# Patient Record
Sex: MALE | Race: WHITE | NOT HISPANIC OR LATINO | Employment: OTHER | URBAN - NONMETROPOLITAN AREA
[De-identification: names, ages, dates, MRNs, and addresses within clinical notes are randomized per-mention and may not be internally consistent; named-entity substitution may affect disease eponyms.]

---

## 2017-02-27 RX ORDER — VALSARTAN AND HYDROCHLOROTHIAZIDE 320; 25 MG/1; MG/1
1 TABLET, FILM COATED ORAL DAILY
Qty: 90 TABLET | Refills: 3 | Status: SHIPPED | OUTPATIENT
Start: 2017-02-27 | End: 2017-02-27 | Stop reason: SDUPTHER

## 2017-02-27 RX ORDER — VALSARTAN AND HYDROCHLOROTHIAZIDE 320; 25 MG/1; MG/1
1 TABLET, FILM COATED ORAL DAILY
Qty: 90 TABLET | Refills: 3 | Status: SHIPPED | OUTPATIENT
Start: 2017-02-27 | End: 2018-02-28 | Stop reason: SDUPTHER

## 2017-03-16 ENCOUNTER — LAB (OUTPATIENT)
Dept: LAB | Facility: HOSPITAL | Age: 62
End: 2017-03-16

## 2017-03-16 DIAGNOSIS — Z11.59 NEED FOR HEPATITIS C SCREENING TEST: ICD-10-CM

## 2017-03-16 DIAGNOSIS — E78.2 MIXED HYPERLIPIDEMIA: ICD-10-CM

## 2017-03-16 LAB
ALBUMIN SERPL-MCNC: 4.8 G/DL (ref 3.4–4.8)
ALBUMIN/GLOB SERPL: 2.1 G/DL (ref 1.1–1.8)
ALP SERPL-CCNC: 57 U/L (ref 38–126)
ALT SERPL W P-5'-P-CCNC: 50 U/L (ref 21–72)
ANION GAP SERPL CALCULATED.3IONS-SCNC: 12 MMOL/L (ref 5–15)
ARTICHOKE IGE QN: 79 MG/DL (ref 1–129)
AST SERPL-CCNC: 38 U/L (ref 17–59)
BILIRUB SERPL-MCNC: 0.9 MG/DL (ref 0.2–1.3)
BUN BLD-MCNC: 18 MG/DL (ref 7–21)
BUN/CREAT SERPL: 15.9 (ref 7–25)
CALCIUM SPEC-SCNC: 9.7 MG/DL (ref 8.4–10.2)
CHLORIDE SERPL-SCNC: 100 MMOL/L (ref 95–110)
CHOLEST SERPL-MCNC: 251 MG/DL (ref 0–199)
CO2 SERPL-SCNC: 28 MMOL/L (ref 22–31)
CREAT BLD-MCNC: 1.13 MG/DL (ref 0.7–1.3)
GFR SERPL CREATININE-BSD FRML MDRD: 66 ML/MIN/1.73 (ref 49–113)
GLOBULIN UR ELPH-MCNC: 2.3 GM/DL (ref 2.3–3.5)
GLUCOSE BLD-MCNC: 105 MG/DL (ref 60–100)
HDLC SERPL-MCNC: 38 MG/DL (ref 60–200)
LDLC/HDLC SERPL: ABNORMAL {RATIO} (ref 0–3.55)
POTASSIUM BLD-SCNC: 4.3 MMOL/L (ref 3.5–5.1)
PROT SERPL-MCNC: 7.1 G/DL (ref 6.3–8.6)
SODIUM BLD-SCNC: 140 MMOL/L (ref 137–145)
TRIGL SERPL-MCNC: 601 MG/DL (ref 20–199)

## 2017-03-16 PROCEDURE — 80053 COMPREHEN METABOLIC PANEL: CPT | Performed by: GENERAL PRACTICE

## 2017-03-16 PROCEDURE — 80061 LIPID PANEL: CPT | Performed by: GENERAL PRACTICE

## 2017-03-16 PROCEDURE — 36415 COLL VENOUS BLD VENIPUNCTURE: CPT

## 2017-03-16 PROCEDURE — 86803 HEPATITIS C AB TEST: CPT | Performed by: GENERAL PRACTICE

## 2017-03-17 LAB — HCV AB SER DONR QL: NEGATIVE

## 2017-03-20 ENCOUNTER — OFFICE VISIT (OUTPATIENT)
Dept: FAMILY MEDICINE CLINIC | Facility: CLINIC | Age: 62
End: 2017-03-20

## 2017-03-20 VITALS
SYSTOLIC BLOOD PRESSURE: 138 MMHG | DIASTOLIC BLOOD PRESSURE: 70 MMHG | HEART RATE: 65 BPM | BODY MASS INDEX: 27.99 KG/M2 | OXYGEN SATURATION: 96 % | WEIGHT: 195.5 LBS | HEIGHT: 70 IN

## 2017-03-20 DIAGNOSIS — E78.2 MIXED HYPERLIPIDEMIA: Primary | ICD-10-CM

## 2017-03-20 DIAGNOSIS — G47.09 OTHER INSOMNIA: ICD-10-CM

## 2017-03-20 DIAGNOSIS — Z12.5 ENCOUNTER FOR PROSTATE CANCER SCREENING: ICD-10-CM

## 2017-03-20 DIAGNOSIS — M15.9 DEGENERATIVE JOINT DISEASE INVOLVING MULTIPLE JOINTS ON BOTH SIDES OF BODY: ICD-10-CM

## 2017-03-20 DIAGNOSIS — Z00.00 ANNUAL PHYSICAL EXAM: ICD-10-CM

## 2017-03-20 DIAGNOSIS — I10 ESSENTIAL HYPERTENSION: ICD-10-CM

## 2017-03-20 PROCEDURE — 99214 OFFICE O/P EST MOD 30 MIN: CPT | Performed by: GENERAL PRACTICE

## 2017-03-20 RX ORDER — DICYCLOMINE HCL 20 MG
20 TABLET ORAL AS NEEDED
COMMUNITY
End: 2017-03-20 | Stop reason: SDUPTHER

## 2017-03-20 RX ORDER — FENOFIBRATE 145 MG/1
145 TABLET, COATED ORAL DAILY
Qty: 90 TABLET | Refills: 3 | Status: SHIPPED | OUTPATIENT
Start: 2017-03-20 | End: 2018-02-28 | Stop reason: SDUPTHER

## 2017-03-20 RX ORDER — PERPHENAZINE 16 MG
1 TABLET ORAL DAILY
COMMUNITY
End: 2017-08-20

## 2017-03-20 RX ORDER — NAPROXEN SODIUM 220 MG
220 TABLET ORAL AS NEEDED
COMMUNITY
End: 2017-03-20

## 2017-03-20 RX ORDER — MAGNESIUM GLUCONATE 27 MG(500)
500 TABLET ORAL DAILY
COMMUNITY

## 2017-03-20 RX ORDER — CELECOXIB 200 MG/1
200 CAPSULE ORAL AS NEEDED
Qty: 90 CAPSULE | Refills: 3 | Status: SHIPPED | OUTPATIENT
Start: 2017-03-20 | End: 2018-07-25 | Stop reason: SDUPTHER

## 2017-03-20 RX ORDER — RANITIDINE 150 MG/1
150 TABLET ORAL DAILY PRN
COMMUNITY
End: 2020-06-02

## 2017-03-20 RX ORDER — TRAZODONE HYDROCHLORIDE 50 MG/1
TABLET ORAL
Qty: 60 TABLET | Refills: 2 | Status: SHIPPED | OUTPATIENT
Start: 2017-03-20 | End: 2017-08-28 | Stop reason: SDUPTHER

## 2017-03-20 RX ORDER — NEBIVOLOL 10 MG/1
10 TABLET ORAL DAILY
Qty: 90 TABLET | Refills: 3 | Status: SHIPPED | OUTPATIENT
Start: 2017-03-20 | End: 2018-02-28 | Stop reason: SDUPTHER

## 2017-03-20 RX ORDER — DICYCLOMINE HCL 20 MG
20 TABLET ORAL AS NEEDED
Qty: 30 TABLET | Refills: 5 | Status: SHIPPED | OUTPATIENT
Start: 2017-03-20 | End: 2018-04-16 | Stop reason: SDUPTHER

## 2017-03-20 RX ORDER — CELECOXIB 200 MG/1
200 CAPSULE ORAL AS NEEDED
COMMUNITY
End: 2017-03-20 | Stop reason: SDUPTHER

## 2017-03-20 RX ORDER — THIAMINE HCL 100 MG
5000 TABLET ORAL DAILY
COMMUNITY

## 2017-03-20 NOTE — PROGRESS NOTES
Subjective   Ben Abebe is a 61 y.o. male.     Chief Complaint   Patient presents with   • Hypertension     For review and evaluation of management of chronic medical problems. Labs reviewed. Would like to try something else for sleep that is not controlled, temazepam not very effective.   Hypertension   This is a chronic problem. The current episode started more than 1 year ago. The problem is unchanged. The problem is controlled. Pertinent negatives include no chest pain, headaches, neck pain, palpitations or shortness of breath. There are no associated agents to hypertension. Past treatments include beta blockers, angiotensin blockers and diuretics. The current treatment provides significant improvement. There are no compliance problems.    Hyperlipidemia   This is a chronic problem. The current episode started more than 1 year ago. The problem is uncontrolled. Recent lipid tests were reviewed and are high. Pertinent negatives include no chest pain, myalgias or shortness of breath. He is currently on no antihyperlipidemic treatment (stopped due to elevated CPK, has not helped). The current treatment provides no improvement of lipids.      The following portions of the patient's history were reviewed and updated as appropriate: allergies, current medications, past social history and problem list.    Current Outpatient Prescriptions:   •  Acetaminophen (TYLENOL EXTRA STRENGTH PO), Take 1 tablet by mouth As Needed., Disp: , Rfl:   •  Alpha-Lipoic Acid 600 MG capsule, Take 1 capsule by mouth Daily., Disp: , Rfl:   •  celecoxib (CeleBREX) 200 MG capsule, Take 1 capsule by mouth As Needed for Mild Pain (1-3)., Disp: 90 capsule, Rfl: 3  •  dicyclomine (BENTYL) 20 MG tablet, Take 1 tablet by mouth As Needed (bowel problem)., Disp: 30 tablet, Rfl: 5  •  magnesium gluconate (MAGONATE) 500 MG tablet, Take 500 mg by mouth Daily., Disp: , Rfl:   •  Multiple Vitamins-Minerals (CENTRUM SILVER ULTRA MENS PO), Take 1  tablet by mouth Daily., Disp: , Rfl:   •  nebivolol (BYSTOLIC) 10 MG tablet, Take 1 tablet by mouth Daily., Disp: 90 tablet, Rfl: 3  •  raNITIdine (ZANTAC) 150 MG tablet, Take 150 mg by mouth Daily., Disp: , Rfl:   •  SUMAtriptan (IMITREX) 50 MG tablet, Take one tablet at onset of headache. May repeat dose one time in 2 hours if headache not relieved., Disp: 9 tablet, Rfl: 2  •  temazepam (RESTORIL) 15 MG capsule, Take 1-2 capsules by mouth At Night As Needed for sleep., Disp: 60 capsule, Rfl: 2  •  tiZANidine (ZANAFLEX) 4 MG tablet, Take 1 tablet by mouth Every 8 (Eight) Hours As Needed for muscle spasms., Disp: 90 tablet, Rfl: 3  •  valsartan-hydrochlorothiazide (DIOVAN-HCT) 320-25 MG per tablet, Take 1 tablet by mouth Daily., Disp: 90 tablet, Rfl: 3  •  vitamin B-12 (CYANOCOBALAMIN) 2500 MCG sublingual tablet tablet, Place 5,000 mcg under the tongue Daily., Disp: , Rfl:   •  fenofibrate (TRICOR) 145 MG tablet, Take 1 tablet by mouth Daily., Disp: 90 tablet, Rfl: 3  •  traZODone (DESYREL) 50 MG tablet, 1 - 2 qhs for sleep, Disp: 60 tablet, Rfl: 2    Review of Systems   Constitutional: Negative.  Negative for activity change, appetite change, chills, fatigue, fever and unexpected weight change.   HENT: Negative.  Negative for congestion, ear pain, hearing loss, nosebleeds, rhinorrhea, sinus pressure, sneezing, sore throat, tinnitus and trouble swallowing.    Eyes: Negative.  Negative for pain, discharge, redness, itching and visual disturbance.   Respiratory: Negative.  Negative for apnea, cough, chest tightness, shortness of breath and wheezing.    Cardiovascular: Negative.  Negative for chest pain, palpitations and leg swelling.   Gastrointestinal: Negative.  Negative for abdominal distention, abdominal pain, constipation, diarrhea, nausea and vomiting.   Endocrine: Negative.    Genitourinary: Negative.  Negative for dysuria, frequency and urgency.   Musculoskeletal: Positive for arthralgias and back pain.  "Negative for gait problem, joint swelling, myalgias, neck pain and neck stiffness.   Skin: Negative.  Negative for color change and rash.   Allergic/Immunologic: Negative.    Neurological: Negative.  Negative for dizziness, weakness, light-headedness, numbness and headaches.   Hematological: Negative.  Negative for adenopathy.   Psychiatric/Behavioral: Negative.  Negative for dysphoric mood and sleep disturbance. The patient is not nervous/anxious.      Objective     Visit Vitals   • /70 (BP Location: Left arm, Patient Position: Sitting, Cuff Size: Adult)   • Pulse 65   • Ht 70\" (177.8 cm)   • Wt 195 lb 8 oz (88.7 kg)   • SpO2 96%   • BMI 28.05 kg/m2     Physical Exam   Constitutional: He is oriented to person, place, and time. He appears well-developed and well-nourished. No distress.   HENT:   Head: Normocephalic.   Nose: Nose normal.   Mouth/Throat: Oropharynx is clear and moist.   Eyes: Conjunctivae and EOM are normal. Pupils are equal, round, and reactive to light. Right eye exhibits no discharge. Left eye exhibits no discharge.   Neck: No thyromegaly present.   Cardiovascular: Normal rate, regular rhythm, normal heart sounds and intact distal pulses.    No murmur heard.  Pulmonary/Chest: Effort normal and breath sounds normal.   Musculoskeletal: He exhibits no edema.   Lymphadenopathy:     He has no cervical adenopathy.   Neurological: He is alert and oriented to person, place, and time.   Skin: Skin is warm and dry.   Psychiatric: He has a normal mood and affect.   Nursing note and vitals reviewed.    Results for orders placed or performed in visit on 03/16/17   Hepatitis C Antibody   Result Value Ref Range    Hepatitis C Ab Negative Negative   Lipid Panel   Result Value Ref Range    Total Cholesterol 251 (H) 0 - 199 mg/dL    Triglycerides 601 (H) 20 - 199 mg/dL    HDL Cholesterol 38 (L) 60 - 200 mg/dL    LDL Cholesterol  79 1 - 129 mg/dL    LDL/HDL Ratio  0.00 - 3.55   Comprehensive Metabolic Panel "   Result Value Ref Range    Glucose 105 (H) 60 - 100 mg/dL    BUN 18 7 - 21 mg/dL    Creatinine 1.13 0.70 - 1.30 mg/dL    Sodium 140 137 - 145 mmol/L    Potassium 4.3 3.5 - 5.1 mmol/L    Chloride 100 95 - 110 mmol/L    CO2 28.0 22.0 - 31.0 mmol/L    Calcium 9.7 8.4 - 10.2 mg/dL    Total Protein 7.1 6.3 - 8.6 g/dL    Albumin 4.80 3.40 - 4.80 g/dL    ALT (SGPT) 50 21 - 72 U/L    AST (SGOT) 38 17 - 59 U/L    Alkaline Phosphatase 57 38 - 126 U/L    Total Bilirubin 0.9 0.2 - 1.3 mg/dL    eGFR Non  Amer 66 49 - 113 mL/min/1.73    Globulin 2.3 2.3 - 3.5 gm/dL    A/G Ratio 2.1 (H) 1.1 - 1.8 g/dL    BUN/Creatinine Ratio 15.9 7.0 - 25.0    Anion Gap 12.0 5.0 - 15.0 mmol/L      Assessment/Plan     Problem List Items Addressed This Visit        Cardiovascular and Mediastinum    Hyperlipidemia - Primary    Relevant Medications    fenofibrate (TRICOR) 145 MG tablet    Other Relevant Orders    Comprehensive Metabolic Panel    Lipid Panel    Essential hypertension    Relevant Medications    nebivolol (BYSTOLIC) 10 MG tablet       Other    Insomnia      Other Visit Diagnoses     Degenerative joint disease involving multiple joints on both sides of body        Relevant Orders    CBC & Differential    Annual physical exam        Relevant Orders    CBC & Differential    Comprehensive Metabolic Panel    Lipid Panel    PSA Screen    Urinalysis With / Culture If Indicated    Encounter for prostate cancer screening        Relevant Orders    PSA Screen        Start tricor. Restart celebrex.     New Medications Ordered This Visit   Medications   • Multiple Vitamins-Minerals (CENTRUM SILVER ULTRA MENS PO)     Sig: Take 1 tablet by mouth Daily.   • Alpha-Lipoic Acid 600 MG capsule     Sig: Take 1 capsule by mouth Daily.   • magnesium gluconate (MAGONATE) 500 MG tablet     Sig: Take 500 mg by mouth Daily.   • raNITIdine (ZANTAC) 150 MG tablet     Sig: Take 150 mg by mouth Daily.   • Acetaminophen (TYLENOL EXTRA STRENGTH PO)     Sig: Take  1 tablet by mouth As Needed.   • vitamin B-12 (CYANOCOBALAMIN) 2500 MCG sublingual tablet tablet     Sig: Place 5,000 mcg under the tongue Daily.   • traZODone (DESYREL) 50 MG tablet     Si - 2 qhs for sleep     Dispense:  60 tablet     Refill:  2   • celecoxib (CeleBREX) 200 MG capsule     Sig: Take 1 capsule by mouth As Needed for Mild Pain (1-3).     Dispense:  90 capsule     Refill:  3   • dicyclomine (BENTYL) 20 MG tablet     Sig: Take 1 tablet by mouth As Needed (bowel problem).     Dispense:  30 tablet     Refill:  5   • nebivolol (BYSTOLIC) 10 MG tablet     Sig: Take 1 tablet by mouth Daily.     Dispense:  90 tablet     Refill:  3   • fenofibrate (TRICOR) 145 MG tablet     Sig: Take 1 tablet by mouth Daily.     Dispense:  90 tablet     Refill:  3

## 2017-06-13 ENCOUNTER — TRANSCRIBE ORDERS (OUTPATIENT)
Dept: LAB | Facility: HOSPITAL | Age: 62
End: 2017-06-13

## 2017-06-13 DIAGNOSIS — R79.89 OTHER ABNORMAL BLOOD CHEMISTRY: Primary | ICD-10-CM

## 2017-07-07 RX ORDER — TIZANIDINE 4 MG/1
TABLET ORAL
Qty: 180 TABLET | Refills: 3 | Status: SHIPPED | OUTPATIENT
Start: 2017-07-07 | End: 2018-07-10 | Stop reason: SDUPTHER

## 2017-08-14 DIAGNOSIS — N50.89 SWOLLEN TESTICLE: Primary | ICD-10-CM

## 2017-08-16 ENCOUNTER — TELEPHONE (OUTPATIENT)
Dept: FAMILY MEDICINE CLINIC | Facility: CLINIC | Age: 62
End: 2017-08-16

## 2017-08-20 ENCOUNTER — APPOINTMENT (OUTPATIENT)
Dept: GENERAL RADIOLOGY | Facility: HOSPITAL | Age: 62
End: 2017-08-20

## 2017-08-20 ENCOUNTER — HOSPITAL ENCOUNTER (INPATIENT)
Facility: HOSPITAL | Age: 62
LOS: 2 days | Discharge: HOME | End: 2017-08-22
Attending: EMERGENCY MEDICINE | Admitting: INTERNAL MEDICINE

## 2017-08-20 ENCOUNTER — APPOINTMENT (OUTPATIENT)
Dept: ULTRASOUND IMAGING | Facility: HOSPITAL | Age: 62
End: 2017-08-20

## 2017-08-20 ENCOUNTER — APPOINTMENT (OUTPATIENT)
Dept: CT IMAGING | Facility: HOSPITAL | Age: 62
End: 2017-08-20

## 2017-08-20 DIAGNOSIS — I26.99 OTHER ACUTE PULMONARY EMBOLISM WITHOUT ACUTE COR PULMONALE (HCC): Primary | ICD-10-CM

## 2017-08-20 DIAGNOSIS — R07.9 CHEST PAIN, UNSPECIFIED TYPE: ICD-10-CM

## 2017-08-20 LAB
ALBUMIN SERPL-MCNC: 4.9 G/DL (ref 3.4–4.8)
ALBUMIN/GLOB SERPL: 1.8 G/DL (ref 1.1–1.8)
ALP SERPL-CCNC: 50 U/L (ref 38–126)
ALT SERPL W P-5'-P-CCNC: 61 U/L (ref 21–72)
ANION GAP SERPL CALCULATED.3IONS-SCNC: 14 MMOL/L (ref 5–15)
AST SERPL-CCNC: 40 U/L (ref 17–59)
BASOPHILS # BLD AUTO: 0.01 10*3/MM3 (ref 0–0.2)
BASOPHILS NFR BLD AUTO: 0.1 % (ref 0–2)
BILIRUB SERPL-MCNC: 1.1 MG/DL (ref 0.2–1.3)
BILIRUB UR QL STRIP: NEGATIVE
BUN BLD-MCNC: 18 MG/DL (ref 7–21)
BUN/CREAT SERPL: 15.1 (ref 7–25)
CALCIUM SPEC-SCNC: 9.5 MG/DL (ref 8.4–10.2)
CHLORIDE SERPL-SCNC: 101 MMOL/L (ref 95–110)
CK MB SERPL-CCNC: 8.59 NG/ML (ref 0–5)
CK SERPL-CCNC: 615 U/L (ref 55–170)
CLARITY UR: CLEAR
CO2 SERPL-SCNC: 24 MMOL/L (ref 22–31)
COLOR UR: YELLOW
CREAT BLD-MCNC: 1.19 MG/DL (ref 0.7–1.3)
D-DIMER, QUANTITATIVE (MAD,POW, STR): 1781 NG/ML (FEU) (ref 0–470)
DEPRECATED RDW RBC AUTO: 42.3 FL (ref 35.1–43.9)
EOSINOPHIL # BLD AUTO: 0.02 10*3/MM3 (ref 0–0.7)
EOSINOPHIL NFR BLD AUTO: 0.3 % (ref 0–7)
ERYTHROCYTE [DISTWIDTH] IN BLOOD BY AUTOMATED COUNT: 13 % (ref 11.5–14.5)
GFR SERPL CREATININE-BSD FRML MDRD: 62 ML/MIN/1.73 (ref 49–113)
GLOBULIN UR ELPH-MCNC: 2.7 GM/DL (ref 2.3–3.5)
GLUCOSE BLD-MCNC: 98 MG/DL (ref 60–100)
GLUCOSE UR STRIP-MCNC: NEGATIVE MG/DL
HCT VFR BLD AUTO: 39.3 % (ref 39–49)
HGB BLD-MCNC: 13.9 G/DL (ref 13.7–17.3)
HGB UR QL STRIP.AUTO: NEGATIVE
HOLD SPECIMEN: NORMAL
HOLD SPECIMEN: NORMAL
IMM GRANULOCYTES # BLD: 0.02 10*3/MM3 (ref 0–0.02)
IMM GRANULOCYTES NFR BLD: 0.3 % (ref 0–0.5)
INR PPP: 0.9 (ref 0.8–1.2)
KETONES UR QL STRIP: NEGATIVE
LEUKOCYTE ESTERASE UR QL STRIP.AUTO: NEGATIVE
LIPASE SERPL-CCNC: 154 U/L (ref 23–300)
LYMPHOCYTES # BLD AUTO: 1.01 10*3/MM3 (ref 0.6–4.2)
LYMPHOCYTES NFR BLD AUTO: 12.7 % (ref 10–50)
MCH RBC QN AUTO: 31.6 PG (ref 26.5–34)
MCHC RBC AUTO-ENTMCNC: 35.4 G/DL (ref 31.5–36.3)
MCV RBC AUTO: 89.3 FL (ref 80–98)
MONOCYTES # BLD AUTO: 0.52 10*3/MM3 (ref 0–0.9)
MONOCYTES NFR BLD AUTO: 6.6 % (ref 0–12)
NEUTROPHILS # BLD AUTO: 6.35 10*3/MM3 (ref 2–8.6)
NEUTROPHILS NFR BLD AUTO: 80 % (ref 37–80)
NITRITE UR QL STRIP: NEGATIVE
NRBC BLD MANUAL-RTO: 0 /100 WBC (ref 0–0)
NT-PROBNP SERPL-MCNC: 178 PG/ML (ref 0–900)
PH UR STRIP.AUTO: 7 [PH] (ref 5–9)
PLATELET # BLD AUTO: 204 10*3/MM3 (ref 150–450)
PMV BLD AUTO: 9.9 FL (ref 8–12)
POTASSIUM BLD-SCNC: 3.4 MMOL/L (ref 3.5–5.1)
PROT SERPL-MCNC: 7.6 G/DL (ref 6.3–8.6)
PROT UR QL STRIP: NEGATIVE
PROTHROMBIN TIME: 12 SECONDS (ref 11.1–15.3)
RBC # BLD AUTO: 4.4 10*6/MM3 (ref 4.37–5.74)
SODIUM BLD-SCNC: 139 MMOL/L (ref 137–145)
SP GR UR STRIP: 1.01 (ref 1–1.03)
TROPONIN I SERPL-MCNC: <0.012 NG/ML
UROBILINOGEN UR QL STRIP: NORMAL
WBC NRBC COR # BLD: 7.93 10*3/MM3 (ref 3.2–9.8)
WHOLE BLOOD HOLD SPECIMEN: NORMAL

## 2017-08-20 PROCEDURE — 25010000002 HYDROMORPHONE PER 4 MG: Performed by: INTERNAL MEDICINE

## 2017-08-20 PROCEDURE — 25010000002 ENOXAPARIN PER 10 MG: Performed by: EMERGENCY MEDICINE

## 2017-08-20 PROCEDURE — 0 IOPAMIDOL PER 1 ML: Performed by: EMERGENCY MEDICINE

## 2017-08-20 PROCEDURE — 81003 URINALYSIS AUTO W/O SCOPE: CPT | Performed by: EMERGENCY MEDICINE

## 2017-08-20 PROCEDURE — 82550 ASSAY OF CK (CPK): CPT | Performed by: EMERGENCY MEDICINE

## 2017-08-20 PROCEDURE — 25010000002 HYDROMORPHONE PER 4 MG: Performed by: EMERGENCY MEDICINE

## 2017-08-20 PROCEDURE — 93970 EXTREMITY STUDY: CPT

## 2017-08-20 PROCEDURE — 85025 COMPLETE CBC W/AUTO DIFF WBC: CPT | Performed by: EMERGENCY MEDICINE

## 2017-08-20 PROCEDURE — 96372 THER/PROPH/DIAG INJ SC/IM: CPT

## 2017-08-20 PROCEDURE — 85379 FIBRIN DEGRADATION QUANT: CPT | Performed by: EMERGENCY MEDICINE

## 2017-08-20 PROCEDURE — 82553 CREATINE MB FRACTION: CPT | Performed by: EMERGENCY MEDICINE

## 2017-08-20 PROCEDURE — 83880 ASSAY OF NATRIURETIC PEPTIDE: CPT | Performed by: EMERGENCY MEDICINE

## 2017-08-20 PROCEDURE — 71275 CT ANGIOGRAPHY CHEST: CPT

## 2017-08-20 PROCEDURE — 25010000002 KETOROLAC TROMETHAMINE PER 15 MG: Performed by: EMERGENCY MEDICINE

## 2017-08-20 PROCEDURE — 93005 ELECTROCARDIOGRAM TRACING: CPT | Performed by: EMERGENCY MEDICINE

## 2017-08-20 PROCEDURE — 85610 PROTHROMBIN TIME: CPT | Performed by: EMERGENCY MEDICINE

## 2017-08-20 PROCEDURE — 71010 HC CHEST PA OR AP: CPT

## 2017-08-20 PROCEDURE — 99285 EMERGENCY DEPT VISIT HI MDM: CPT

## 2017-08-20 PROCEDURE — 84484 ASSAY OF TROPONIN QUANT: CPT | Performed by: EMERGENCY MEDICINE

## 2017-08-20 PROCEDURE — 83690 ASSAY OF LIPASE: CPT | Performed by: EMERGENCY MEDICINE

## 2017-08-20 PROCEDURE — 80053 COMPREHEN METABOLIC PANEL: CPT | Performed by: EMERGENCY MEDICINE

## 2017-08-20 PROCEDURE — 25010000002 ONDANSETRON PER 1 MG: Performed by: EMERGENCY MEDICINE

## 2017-08-20 RX ORDER — ONDANSETRON 2 MG/ML
4 INJECTION INTRAMUSCULAR; INTRAVENOUS ONCE
Status: COMPLETED | OUTPATIENT
Start: 2017-08-20 | End: 2017-08-20

## 2017-08-20 RX ORDER — TRAZODONE HYDROCHLORIDE 50 MG/1
50 TABLET ORAL NIGHTLY
Status: DISCONTINUED | OUTPATIENT
Start: 2017-08-20 | End: 2017-08-22 | Stop reason: HOSPADM

## 2017-08-20 RX ORDER — TIZANIDINE 4 MG/1
4 TABLET ORAL EVERY 8 HOURS PRN
Status: DISCONTINUED | OUTPATIENT
Start: 2017-08-20 | End: 2017-08-22 | Stop reason: HOSPADM

## 2017-08-20 RX ORDER — KETOROLAC TROMETHAMINE 15 MG/ML
15 INJECTION, SOLUTION INTRAMUSCULAR; INTRAVENOUS ONCE
Status: COMPLETED | OUTPATIENT
Start: 2017-08-20 | End: 2017-08-20

## 2017-08-20 RX ORDER — ONDANSETRON 2 MG/ML
4 INJECTION INTRAMUSCULAR; INTRAVENOUS EVERY 6 HOURS PRN
Status: DISCONTINUED | OUTPATIENT
Start: 2017-08-20 | End: 2017-08-22 | Stop reason: HOSPADM

## 2017-08-20 RX ORDER — DICYCLOMINE HCL 20 MG
20 TABLET ORAL 3 TIMES DAILY PRN
Status: DISCONTINUED | OUTPATIENT
Start: 2017-08-20 | End: 2017-08-22 | Stop reason: HOSPADM

## 2017-08-20 RX ORDER — NEBIVOLOL 5 MG/1
10 TABLET ORAL DAILY
Status: DISCONTINUED | OUTPATIENT
Start: 2017-08-20 | End: 2017-08-21

## 2017-08-20 RX ORDER — FAMOTIDINE 20 MG/1
20 TABLET, FILM COATED ORAL 2 TIMES DAILY
Status: DISCONTINUED | OUTPATIENT
Start: 2017-08-20 | End: 2017-08-22 | Stop reason: HOSPADM

## 2017-08-20 RX ORDER — VALSARTAN 160 MG/1
320 TABLET ORAL
Status: DISCONTINUED | OUTPATIENT
Start: 2017-08-20 | End: 2017-08-22 | Stop reason: HOSPADM

## 2017-08-20 RX ORDER — LANOLIN ALCOHOL/MO/W.PET/CERES
2000 CREAM (GRAM) TOPICAL DAILY
Status: DISCONTINUED | OUTPATIENT
Start: 2017-08-20 | End: 2017-08-22 | Stop reason: HOSPADM

## 2017-08-20 RX ORDER — ACETAMINOPHEN 325 MG/1
650 TABLET ORAL AS NEEDED
Status: DISCONTINUED | OUTPATIENT
Start: 2017-08-20 | End: 2017-08-22 | Stop reason: HOSPADM

## 2017-08-20 RX ORDER — ASPIRIN 325 MG
325 TABLET ORAL ONCE
Status: COMPLETED | OUTPATIENT
Start: 2017-08-20 | End: 2017-08-20

## 2017-08-20 RX ORDER — SODIUM CHLORIDE 9 MG/ML
50 INJECTION, SOLUTION INTRAVENOUS CONTINUOUS
Status: DISCONTINUED | OUTPATIENT
Start: 2017-08-20 | End: 2017-08-21

## 2017-08-20 RX ORDER — SUMATRIPTAN 50 MG/1
50 TABLET, FILM COATED ORAL ONCE AS NEEDED
Status: DISCONTINUED | OUTPATIENT
Start: 2017-08-20 | End: 2017-08-22 | Stop reason: HOSPADM

## 2017-08-20 RX ORDER — SODIUM CHLORIDE 0.9 % (FLUSH) 0.9 %
10 SYRINGE (ML) INJECTION AS NEEDED
Status: DISCONTINUED | OUTPATIENT
Start: 2017-08-20 | End: 2017-08-22 | Stop reason: HOSPADM

## 2017-08-20 RX ORDER — FENOFIBRATE 145 MG/1
145 TABLET, COATED ORAL DAILY
Status: DISCONTINUED | OUTPATIENT
Start: 2017-08-20 | End: 2017-08-22 | Stop reason: HOSPADM

## 2017-08-20 RX ORDER — SODIUM CHLORIDE 0.9 % (FLUSH) 0.9 %
1-10 SYRINGE (ML) INJECTION AS NEEDED
Status: DISCONTINUED | OUTPATIENT
Start: 2017-08-20 | End: 2017-08-22 | Stop reason: HOSPADM

## 2017-08-20 RX ADMIN — HYDROMORPHONE HYDROCHLORIDE 1 MG: 1 INJECTION, SOLUTION INTRAMUSCULAR; INTRAVENOUS; SUBCUTANEOUS at 12:18

## 2017-08-20 RX ADMIN — ASPIRIN 325 MG: 325 TABLET, COATED ORAL at 10:44

## 2017-08-20 RX ADMIN — FAMOTIDINE 20 MG: 20 TABLET ORAL at 18:24

## 2017-08-20 RX ADMIN — HYDROMORPHONE HYDROCHLORIDE 0.5 MG: 1 INJECTION, SOLUTION INTRAMUSCULAR; INTRAVENOUS; SUBCUTANEOUS at 11:25

## 2017-08-20 RX ADMIN — TIZANIDINE 4 MG: 4 TABLET ORAL at 21:36

## 2017-08-20 RX ADMIN — ENOXAPARIN SODIUM 80 MG: 80 INJECTION SUBCUTANEOUS at 14:02

## 2017-08-20 RX ADMIN — NEBIVOLOL HYDROCHLORIDE 10 MG: 5 TABLET ORAL at 18:24

## 2017-08-20 RX ADMIN — TRAZODONE HYDROCHLORIDE 50 MG: 50 TABLET ORAL at 21:16

## 2017-08-20 RX ADMIN — IOPAMIDOL 80 ML: 755 INJECTION, SOLUTION INTRAVENOUS at 12:28

## 2017-08-20 RX ADMIN — HYDROMORPHONE HYDROCHLORIDE 1 MG: 1 INJECTION, SOLUTION INTRAMUSCULAR; INTRAVENOUS; SUBCUTANEOUS at 18:19

## 2017-08-20 RX ADMIN — ONDANSETRON 4 MG: 2 INJECTION INTRAMUSCULAR; INTRAVENOUS at 11:25

## 2017-08-20 RX ADMIN — Medication 10 ML: at 12:21

## 2017-08-20 RX ADMIN — Medication 10 ML: at 14:02

## 2017-08-20 RX ADMIN — SODIUM CHLORIDE 50 ML/HR: 9 INJECTION, SOLUTION INTRAVENOUS at 18:15

## 2017-08-20 RX ADMIN — ACETAMINOPHEN 650 MG: 325 TABLET ORAL at 21:36

## 2017-08-20 RX ADMIN — KETOROLAC TROMETHAMINE 15 MG: 15 INJECTION, SOLUTION INTRAMUSCULAR; INTRAVENOUS at 11:25

## 2017-08-20 NOTE — H&P
History and Physical  Kirk Tellez MD  Hospitalist      Patient Care Team:  Melvi Hernandez MD as PCP - General    Chief complaint   Chief Complaint   Patient presents with   • Chest Pain   • Shortness of Breath       Subjective     Patient is a 62 y.o. male admitted for a rather sudden onset of dyspnea, pleuritic chest pain, symptoms that occurred 10 or 12 hours prior to admission. Movement or deep breathing made his pain worse. [facility] and referred to the ER given the severity of his pain.    He denies any leg swelling, or recent injuries. He has been as active as ever, working in the yard, fishing, etc.      History  Past Medical History:   Diagnosis Date   • Anxiety    • Benign prostatic hyperplasia    • Cobalamin deficiency    • Depression    • Diverticular disease of colon    • Hypercholesterolemia    • Hypertension    • Insomnia    • Irritable bowel syndrome    • Migraine    • Osteoarthritis    • Peripheral neuropathy    • Restless legs      Past Surgical History:   Procedure Laterality Date   • COLECTOMY PARTIAL / TOTAL  11/2009   • LUMBAR DISC SURGERY  2000     Family History   Problem Relation Age of Onset   • Coronary artery disease Father      Social History   Substance Use Topics   • Smoking status: Never Smoker   • Smokeless tobacco: Never Used   • Alcohol use Yes     Prescriptions Prior to Admission   Medication Sig Dispense Refill Last Dose   • celecoxib (CeleBREX) 200 MG capsule Take 1 capsule by mouth As Needed for Mild Pain (1-3). 90 capsule 3 8/20/2017 at 0900   • dicyclomine (BENTYL) 20 MG tablet Take 1 tablet by mouth As Needed (bowel problem). 30 tablet 5 Past Week at Unknown time   • fenofibrate (TRICOR) 145 MG tablet Take 1 tablet by mouth Daily. 90 tablet 3 8/20/2017 at 0900   • magnesium gluconate (MAGONATE) 500 MG tablet Take 500 mg by mouth Daily.   8/20/2017 at 0900   • Multiple Vitamins-Minerals (CENTRUM SILVER ULTRA MENS PO) Take 1 tablet by mouth  Daily.   8/20/2017 at 0900   • nebivolol (BYSTOLIC) 10 MG tablet Take 1 tablet by mouth Daily. 90 tablet 3 8/20/2017 at 0900   • tiZANidine (ZANAFLEX) 4 MG tablet 1-2 tabs qhs 180 tablet 3 8/19/2017 at 2100   • traZODone (DESYREL) 50 MG tablet 1 - 2 qhs for sleep 60 tablet 2 8/19/2017 at 2100   • valsartan-hydrochlorothiazide (DIOVAN-HCT) 320-25 MG per tablet Take 1 tablet by mouth Daily. 90 tablet 3 8/20/2017 at 0900   • vitamin B-12 (CYANOCOBALAMIN) 2500 MCG sublingual tablet tablet Place 5,000 mcg under the tongue Daily.   8/20/2017 at 0900   • Acetaminophen (TYLENOL EXTRA STRENGTH PO) Take 1 tablet by mouth As Needed.      • EPINEPHrine (EPIPEN) 0.3 MG/0.3ML solution auto-injector injection Use as directed      • raNITIdine (ZANTAC) 150 MG tablet Take 150 mg by mouth Daily As Needed.   8/18/2017   • SUMAtriptan (IMITREX) 50 MG tablet Take one tablet at onset of headache. May repeat dose one time in 2 hours if headache not relieved. 9 tablet 2 Taking     Allergies:  Lyrica [pregabalin]; Ambien [zolpidem]; Bee venom; and Codeine    Review of Systems  Review of Systems   Respiratory: Positive for cough and shortness of breath.    Cardiovascular: Positive for chest pain. Negative for palpitations and leg swelling.   Gastrointestinal: Negative for abdominal distention, abdominal pain, diarrhea, nausea, rectal pain and vomiting.   Genitourinary: Negative for dysuria, frequency and urgency.   Musculoskeletal: Positive for arthralgias and back pain. Negative for joint swelling.   Skin: Negative for pallor.   Neurological: Positive for dizziness, weakness and light-headedness. Negative for syncope.   Psychiatric/Behavioral: Negative for agitation, behavioral problems and confusion.   All other systems reviewed and are negative.      Objective     Vital Signs  Temp:  [97.3 °F (36.3 °C)-98.2 °F (36.8 °C)] 97.8 °F (36.6 °C)  Heart Rate:  [45-66] 59  Resp:  [15-18] 18  BP: (124-182)/(67-86) 140/74    Physical Exam:  Physical  Exam   Constitutional: He is oriented to person, place, and time. He appears well-developed and well-nourished. No distress.   HENT:   Head: Normocephalic and atraumatic.   Eyes: EOM are normal. Pupils are equal, round, and reactive to light. No scleral icterus.   Neck: Normal range of motion. Neck supple.   Cardiovascular: Normal rate.  Exam reveals no friction rub.    No murmur heard.  Pulmonary/Chest: Effort normal and breath sounds normal. No respiratory distress. He has no wheezes.   Abdominal: Soft. Bowel sounds are normal. He exhibits no distension. There is no tenderness.   Musculoskeletal: Normal range of motion. He exhibits no edema, tenderness or deformity.   Neurological: He is alert and oriented to person, place, and time. No cranial nerve deficit. Coordination normal.   Skin: Skin is warm and dry.   Psychiatric: He has a normal mood and affect. His behavior is normal.   Vitals reviewed.      Results Review:   Lab Results (last 24 hours)     Procedure Component Value Units Date/Time    CBC & Differential [[id number]] Collected:  08/20/17 1104    Specimen:  Blood Updated:  08/20/17 1111    Narrative:       The following orders were created for panel order CBC & Differential.  Procedure                               Abnormality         Status                     ---------                               -----------         ------                     CBC Auto Differential[[id number]]        Normal              Final result                 Please view results for these tests on the individual orders.    CBC Auto Differential [[id number]]  (Normal) Collected:  08/20/17 1104    Specimen:  Blood Updated:  08/20/17 1111     WBC 7.93 10*3/mm3      RBC 4.40 10*6/mm3      Hemoglobin 13.9 g/dL      Hematocrit 39.3 %      MCV 89.3 fL      MCH 31.6 pg      MCHC 35.4 g/dL      RDW 13.0 %      RDW-SD 42.3 fl      MPV 9.9 fL      Platelets 204 10*3/mm3      Neutrophil % 80.0 %      Lymphocyte % 12.7 %      Monocyte % 6.6 %       Eosinophil % 0.3 %      Basophil % 0.1 %      Immature Grans % 0.3 %      Neutrophils, Absolute 6.35 10*3/mm3      Lymphocytes, Absolute 1.01 10*3/mm3      Monocytes, Absolute 0.52 10*3/mm3      Eosinophils, Absolute 0.02 10*3/mm3      Basophils, Absolute 0.01 10*3/mm3      Immature Grans, Absolute 0.02 10*3/mm3      nRBC 0.0 /100 WBC     Lipase [[id number]]  (Normal) Collected:  08/20/17 1104    Specimen:  Blood Updated:  08/20/17 1127     Lipase 154 U/L     CK [[id number]]  (Abnormal) Collected:  08/20/17 1104    Specimen:  Blood Updated:  08/20/17 1127     Creatine Kinase 615 (H) U/L     Comprehensive Metabolic Panel [[id number]]  (Abnormal) Collected:  08/20/17 1104    Specimen:  Blood Updated:  08/20/17 1127     Glucose 98 mg/dL      BUN 18 mg/dL      Creatinine 1.19 mg/dL      Sodium 139 mmol/L      Potassium 3.4 (L) mmol/L      Chloride 101 mmol/L      CO2 24.0 mmol/L      Calcium 9.5 mg/dL      Total Protein 7.6 g/dL      Albumin 4.90 (H) g/dL      ALT (SGPT) 61 U/L      AST (SGOT) 40 U/L      Alkaline Phosphatase 50 U/L      Total Bilirubin 1.1 mg/dL      eGFR Non African Amer 62 mL/min/1.73      Globulin 2.7 gm/dL      A/G Ratio 1.8 g/dL      BUN/Creatinine Ratio 15.1     Anion Gap 14.0 mmol/L     BNP [[id number]]  (Normal) Collected:  08/20/17 1104    Specimen:  Blood Updated:  08/20/17 1145     proBNP 178.0 pg/mL     CK-MB [[id number]]  (Abnormal) Collected:  08/20/17 1104    Specimen:  Blood Updated:  08/20/17 1149     CKMB 8.59 (H) ng/mL     Troponin [[id number]]  (Normal) Collected:  08/20/17 1104    Specimen:  Blood Updated:  08/20/17 1149     Troponin I <0.012 ng/mL     D-dimer, Quantitative [[id number]]  (Abnormal) Collected:  08/20/17 1108    Specimen:  Blood Updated:  08/20/17 1159     D-Dimer, Quantitative 1781 (H) ng/mL (FEU)     Narrative:       Dimer values <500 ng/ml FEU are FDA approved as aid in diagnosis of deep venous thrombosis and pulmonary embolism.  This test should not be used in an  exclusion strategy with pretest probability alone.    A recent guideline regarding diagnosis for pulmonary thomboembolism recommends an adjusted exclusion criterion of age x 10 ng/ml FEU for patients >50 years of age ([place] Intern Med 2015; 163: 701-711).    Urinalysis With / Culture If Indicated [[id number]]  (Normal) Collected:  08/20/17 1151    Specimen:  Urine from Urine, Clean Catch Updated:  08/20/17 1201     Color, UA Yellow     Appearance, UA Clear     pH, UA 7.0     Specific Gravity, UA 1.014     Glucose, UA Negative     Ketones, UA Negative     Bilirubin, UA Negative     Blood, UA Negative     Protein, UA Negative     Leuk Esterase, UA Negative     Nitrite, UA Negative     Urobilinogen, UA 0.2 E.U./dL    Narrative:       Urine microscopic not indicated.    Green Top (Gel) [[id number]] Collected:  08/20/17 1104    Specimen:  Blood Updated:  08/20/17 1301     Extra Tube Hold for add-ons.      Auto resulted.       Lavender Top [[id number]] Collected:  08/20/17 1104    Specimen:  Blood Updated:  08/20/17 1301     Extra Tube hold for add-on      Auto resulted       Extra Tubes [[id number]] Collected:  08/20/17 1108    Specimen:  Blood from Blood, Venous Line Updated:  08/20/17 1301    Narrative:       The following orders were created for panel order Extra Tubes.  Procedure                               Abnormality         Status                     ---------                               -----------         ------                     Light Blue Top[[id number]]                                   Final result               Gold Top - SST[[id number]]                                   Final result                 Please view results for these tests on the individual orders.    Light Blue Top [[id number]] Collected:  08/20/17 1108    Specimen:  Blood Updated:  08/20/17 1301     Extra Tube hold for add-on      Auto resulted       Gold Top - SST [[id number]] Collected:  08/20/17 1108    Specimen:  Blood Updated:  08/20/17 1301      Extra Tube Hold for add-ons.      Auto resulted.       Protime-INR [[id number]]  (Normal) Collected:  08/20/17 1104    Specimen:  Blood Updated:  08/20/17 1351     Protime 12.0 Seconds      INR 0.90    Narrative:       Therapeutic range for most indications is 2.0-3.0 INR,  or 2.5-3.5 for mechanical heart valves.    Light Blue Top [[id number]] Collected:  08/20/17 1104    Specimen:  Blood Updated:  08/20/17 1401     Extra Tube hold for add-on      Auto resulted       [place] Draw [[id number]] Collected:  08/20/17 1104    Specimen:  Blood Updated:  08/20/17 1544    Narrative:       The following orders were created for panel order [place] Draw.  Procedure                               Abnormality         Status                     ---------                               -----------         ------                     Light Blue Top[[id number]]                                   Final result               Green Top (Gel)[[id number]]                                  Final result               Lavender Top[[id number]]                                     Final result               Gold Top - SST[[id number]]                                                                Please view results for these tests on the individual orders.          Imaging Results (last 24 hours)     Procedure Component Value Units Date/Time    XR Chest 1 View [[id number]] Collected:  08/20/17 1105     Updated:  08/20/17 1130    Narrative:       [facility] [place]    Patient Name: MR. OSITO STRAUSS    ORDERING: DINA URIAS     ATTENDING: DINA URIAS     REFERRING: DINA URIAS    -----------------------    PROCEDURE: Portable chest x-ray    TECHNIQUE: Single AP view of the chest    COMPARISON: 8/20/2017    HISTORY: Chest pain protocol    FINDINGS:     Life-support devices: None    Lungs/pleura: Mild pulmonary vascular congestion. No pneumothorax  or pleural effusion.    Heart, hilar and mediastinal structures:  Normal  accounting for  projection and technique      Impression:       CONCLUSION:  Mild pulmonary vascular congestion.    Electronically signed by:  Jose Ricks MD  8/20/2017 11:29 AM  CDT Workstation: [id number]    CT Angiogram Chest With Contrast [[id number]] Collected:  08/20/17 1226     Updated:  08/20/17 1251    Narrative:       Patient Name:  MR. OSITO STRAUSS  Patient ID:  [id number]   Ordering:  DINA URIAS  Attending:  DINA URIAS  Referring:  DINA URIAS  ------------------------------------------------    CT angiogram of the chest with contrast    History: Chest pain    Axial spiral scans of the chest were obtained  with  intravenous  contrast.  Coronal and sagittal reconstructions and both oblique  coronal MIPS obtained the same workstation.    This exam was performed according to our departmental  dose-optimization program, which includes automated exposure  control, adjustment of the mA and/or kV according to patient size  and/or use of iterative reconstruction technique.    DLP: 392.80    Comparison: None.    Bilateral lower lobe segmental and subsegmental emboli.  No mediastinal hematoma.  No hilar, mediastinal or axillary adenopathy.  No thoracic aortic aneurysm or dissection.  No pericardial effusion.    Minimal subsegmental atelectasis or infiltrate anteriorly and  peripherally in the right lower lobe.  No mass or noncalcified nodule.  No pleural effusion.  No pneumothorax.    Fatty infiltration of the liver.    No acute osseous abnormality.      Impression:       Conclusion:  Bilateral lower lobe segmental and subsegmental emboli.  Minimal subsegmental atelectasis or infiltrate anteriorly and  peripherally in the right lower lobe.  Fatty infiltration of the liver.    Report called at approximately 1:00 PM CST.    [id number]    Electronically signed by:  Sam Mcdonald MD  8/20/2017 12:50 PM  CDT Workstation: [email or web address] Venous Doppler Lower Extremity Bilateral (duplex) [[id number]]  Collected:  08/20/17 1254     Updated:  08/20/17 1329    Narrative:       Patient Name:  MR. OSITO STRAUSS  Patient ID:  [id number]   Ordering:  DINA URIAS  Attending:  DINA URIAS  Referring:  DINA RUIAS  ------------------------------------------------    Ultrasound venous duplex bilateral lower extremities    HISTORY: Pulmonary emboli. Evaluate for deep venous thrombosis.    Duplex ultrasound of the deep venous system of each lower  extremity was performed    Real-time images demonstrate normal compressibility without  evidence of intraluminal thrombus.  Doppler shows phasic flow and augmentation.  Color Doppler also reveals venous patency.      Impression:       CONCLUSION:  No ultrasound evidence of deep venous thrombosis of either lower  extremity.    [id number]    Electronically signed by:  Sam Mcdonald MD  8/20/2017 1:28 PM CDT  Workstation: [email or web address]          Assessment/Plan     Principal Problem:    Pulmonary embolism without acute cor pulmonale  Active Problems:    Insomnia    Peripheral neuropathy    Anxiety    Hypertension    Depression    Obtain Cardiac Echo / follow up on the ECG. Received sc Lovenox, we'll start po Xarelto (x 3 month at least)    Kirk Tellez MD  08/21/17  7:25 AM

## 2017-08-20 NOTE — ED PROVIDER NOTES
Subjective   HPI Comments: Patient presents with 2 days of right sided chest pain.  Patient notes this in the lower lung field initially then radiating higher in the chest.  Reproducible with movement of the body and deep breathing, but not palpation.  Denies f/c.  Does note a lot of digging over the past 24 hour, filling old post holes.  Similar symptoms about one month ago with shortness of breath.  Patient felt last night he could not get in a comfortable position of catch his breath.  No bowel or bladder changes.        History provided by:  Patient   used: No        Review of Systems   Constitutional: Negative.  Negative for appetite change, chills and fever.   HENT: Negative.  Negative for congestion.    Eyes: Negative.  Negative for photophobia and visual disturbance.   Respiratory: Positive for shortness of breath. Negative for cough and chest tightness.    Cardiovascular: Positive for chest pain. Negative for palpitations.   Gastrointestinal: Negative.  Negative for abdominal pain, constipation, diarrhea, nausea and vomiting.   Endocrine: Negative.    Genitourinary: Negative.  Negative for decreased urine volume, dysuria, flank pain and hematuria.   Musculoskeletal: Negative.  Negative for arthralgias, back pain, myalgias, neck pain and neck stiffness.   Skin: Negative.  Negative for pallor.   Neurological: Negative.  Negative for dizziness, syncope, weakness, light-headedness, numbness and headaches.   Psychiatric/Behavioral: Negative.  Negative for confusion and suicidal ideas. The patient is not nervous/anxious.        Past Medical History:   Diagnosis Date   • Acute bronchitis    • Acute pharyngitis     VIRAL   • Allergic rhinitis    • Anxiety state    • Astigmatism    • Backache    • Benign prostatic hyperplasia    • Bradycardia     unspecified - 40's at home at times      • Cobalamin deficiency    • Depressive disorder    • Diverticular disease of colon    • Encounter for  immunization    • Encounter for routine adult health examination    • Essential hypertension    • Headache     not ocular      • Hypercholesterolemia    • Hyperlipidemia    • Hypertensive disorder    • Impacted cerumen    • Inguinal hernia     Left inguinal hernia easily reducible      • Insomnia    • Irritable bowel syndrome    • Myopia    • Nausea    • Neck pain    • Need for prophylactic vaccination and inoculation against influenza    • Neuropathy     ?due to prev B12 def      • Neutropenia    • Osteoarthritis    • Otalgia    • Restless legs    • Sinus headache    • Tinnitus    • Upper respiratory infection        Allergies   Allergen Reactions   • Lyrica [Pregabalin] Other (See Comments)     Causes blisters to form on skin   • Ambien [Zolpidem] Hallucinations   • Bee Venom Swelling     Extreme Swelling   • Codeine Nausea And Vomiting       Past Surgical History:   Procedure Laterality Date   • COLECTOMY PARTIAL / TOTAL  11/2009    Laparoscopic   • COLONOSCOPY  12/14/2012    Prior resection of t he ascending colon and sigmoid colon found. Internal grade 1 hemorrhoids found in the anus.Tattoo present in the distal left colon.   • HERNIA REPAIR  03/07/2016    Indirect left inguinal hernia with cord lipoma.Hernia repair with mesh.   • INJECTION OF MEDICATION  05/22/2015    Kenalog (1)     • INJECTION OF MEDICATION  05/22/2015    Toradol (1)     • LUMBAR DISC SURGERY         Family History   Problem Relation Age of Onset   • Diabetes Other    • Heart disease Other    • Hyperlipidemia Other    • Hypertension Other    • Osteoporosis Other    • Skin cancer Other        Social History     Social History   • Marital status:      Spouse name: N/A   • Number of children: N/A   • Years of education: N/A     Social History Main Topics   • Smoking status: Never Smoker   • Smokeless tobacco: Never Used   • Alcohol use Yes      Comment: Socially. 6 per year.   • Drug use: None   • Sexual activity: Not Asked     Other  Topics Concern   • None     Social History Narrative           Objective   Physical Exam   Constitutional: He is oriented to person, place, and time. He appears well-developed and well-nourished. No distress.   HENT:   Head: Normocephalic and atraumatic.   Eyes: Conjunctivae and EOM are normal.   Neck: Normal range of motion. Neck supple. No JVD present.   Cardiovascular: Normal rate, regular rhythm, normal heart sounds and intact distal pulses.  Exam reveals no gallop and no friction rub.    No murmur heard.  Reproduction of symptoms with movement of the torso,though no change with palpation of the chest or back.     Pulmonary/Chest: Effort normal. No respiratory distress. He has no wheezes. He has no rales. He exhibits no tenderness.   Clear lung sounds.  No increased effort.     Abdominal: Soft. Bowel sounds are normal. He exhibits no distension and no mass. There is no tenderness. There is no rebound and no guarding.   Musculoskeletal: Normal range of motion.   Neurological: He is alert and oriented to person, place, and time.   Skin: Skin is warm and dry.   Psychiatric: He has a normal mood and affect. His behavior is normal. Judgment and thought content normal.   Nursing note and vitals reviewed.      Procedures         ED Course  ED Course      Labs Reviewed   COMPREHENSIVE METABOLIC PANEL - Abnormal; Notable for the following:        Result Value    Potassium 3.4 (*)     Albumin 4.90 (*)     All other components within normal limits   CK - Abnormal; Notable for the following:     Creatine Kinase 615 (*)     All other components within normal limits   CK MB - Abnormal; Notable for the following:     CKMB 8.59 (*)     All other components within normal limits   D-DIMER, QUANTITATIVE - Abnormal; Notable for the following:     D-Dimer, Quantitative 1781 (*)     All other components within normal limits    Narrative:     Dimer values <500 ng/ml FEU are FDA approved as aid in diagnosis of deep venous thrombosis  and pulmonary embolism.  This test should not be used in an exclusion strategy with pretest probability alone.    A recent guideline regarding diagnosis for pulmonary thomboembolism recommends an adjusted exclusion criterion of age x 10 ng/ml FEU for patients >50 years of age ([place] Intern Med 2015; 163: 701-711).   TROPONIN (IN-HOUSE) - Normal   BNP (IN-HOUSE) - Normal   LIPASE - Normal   URINALYSIS W/ CULTURE IF INDICATED - Normal    Narrative:     Urine microscopic not indicated.   CBC WITH AUTO DIFFERENTIAL - Normal   RAINBOW DRAW    Narrative:     The following orders were created for panel order [place] Draw.  Procedure                               Abnormality         Status                     ---------                               -----------         ------                     Light Blue Top[[id number]]                                                              Green Top (Gel)[[id number]]                                  Final result               Lavender Top[[id number]]                                     Final result               Gold Top - SST[[id number]]                                                                Please view results for these tests on the individual orders.   TROPONIN (IN-HOUSE)   PROTIME-INR   CBC AND DIFFERENTIAL    Narrative:     The following orders were created for panel order CBC & Differential.  Procedure                               Abnormality         Status                     ---------                               -----------         ------                     CBC Auto Differential[[id number]]        Normal              Final result                 Please view results for these tests on the individual orders.   GREEN TOP   LAVENDER TOP   EXTRA TUBES    Narrative:     The following orders were created for panel order Extra Tubes.  Procedure                               Abnormality         Status                     ---------                               -----------          ------                     Light Blue Top[[id number]]                                   Final result               Gold Top - [facility][[id number]]                                   Final result                 Please view results for these tests on the individual orders.   LIGHT BLUE TOP   GOLD TOP - SST   LIGHT BLUE TOP   GOLD TOP - [facility]       CT Angiogram Chest With Contrast   Final Result   Conclusion:   Bilateral lower lobe segmental and subsegmental emboli.   Minimal subsegmental atelectasis or infiltrate anteriorly and   peripherally in the right lower lobe.   Fatty infiltration of the liver.      Report called at approximately 1:00 PM CST.      [id number]      Electronically signed by:  Sam Mcdonald MD  8/20/2017 12:50 PM   CDT Workstation: [facility] Chest 1 View   Final Result   CONCLUSION:   Mild pulmonary vascular congestion.      Electronically signed by:  Jose Ricks MD  8/20/2017 11:29 AM   CDT Workstation: [id number]      US Venous Doppler Lower Extremity Bilateral (duplex)    (Results Pending)     Bilateral PE noted.  Awaiting U/s.  Will start lovenox.  Inpatient management.              MDM    Final diagnoses:   Other acute pulmonary embolism without acute cor pulmonale   Chest pain, unspecified type            Jose Luis Arauz MD  08/20/17 1306       Jose Luis Arauz MD  08/20/17 1327

## 2017-08-21 ENCOUNTER — APPOINTMENT (OUTPATIENT)
Dept: CARDIOLOGY | Facility: HOSPITAL | Age: 62
End: 2017-08-21
Attending: INTERNAL MEDICINE

## 2017-08-21 LAB
ANION GAP SERPL CALCULATED.3IONS-SCNC: 13 MMOL/L (ref 5–15)
BH CV ECHO MEAS - ACS: 2.2 CM
BH CV ECHO MEAS - AO MAX PG (FULL): 1.2 MMHG
BH CV ECHO MEAS - AO MAX PG: 10.8 MMHG
BH CV ECHO MEAS - AO MEAN PG (FULL): 1.5 MMHG
BH CV ECHO MEAS - AO MEAN PG: 6.2 MMHG
BH CV ECHO MEAS - AO ROOT AREA: 5.9 CM^2
BH CV ECHO MEAS - AO ROOT DIAM: 2.7 CM
BH CV ECHO MEAS - AO V2 MAX: 164 CM/SEC
BH CV ECHO MEAS - AO V2 MEAN: 117.5 CM/SEC
BH CV ECHO MEAS - AO V2 VTI: 35.6 CM
BH CV ECHO MEAS - AVA(I,A): 4 CM^2
BH CV ECHO MEAS - AVA(I,D): 4 CM^2
BH CV ECHO MEAS - AVA(V,A): 4.1 CM^2
BH CV ECHO MEAS - AVA(V,D): 4.1 CM^2
BH CV ECHO MEAS - EDV(CUBED): 138.7 ML
BH CV ECHO MEAS - EDV(TEICH): 128.1 ML
BH CV ECHO MEAS - EF(CUBED): 77.2 %
BH CV ECHO MEAS - EF(MOD-SP4): 60 %
BH CV ECHO MEAS - EF(TEICH): 68.9 %
BH CV ECHO MEAS - ESV(CUBED): 31.6 ML
BH CV ECHO MEAS - ESV(TEICH): 39.8 ML
BH CV ECHO MEAS - FS: 38.9 %
BH CV ECHO MEAS - IVS/LVPW: 1.1
BH CV ECHO MEAS - IVSD: 1.1 CM
BH CV ECHO MEAS - LA DIMENSION: 3.7 CM
BH CV ECHO MEAS - LA/AO: 1.3
BH CV ECHO MEAS - LV MASS(C)D: 203.5 GRAMS
BH CV ECHO MEAS - LV MAX PG: 9.5 MMHG
BH CV ECHO MEAS - LV MEAN PG: 4.7 MMHG
BH CV ECHO MEAS - LV V1 MAX: 154.2 CM/SEC
BH CV ECHO MEAS - LV V1 MEAN: 100.9 CM/SEC
BH CV ECHO MEAS - LV V1 VTI: 32.3 CM
BH CV ECHO MEAS - LVIDD: 5.2 CM
BH CV ECHO MEAS - LVIDS: 3.2 CM
BH CV ECHO MEAS - LVOT AREA (M): 4.5 CM^2
BH CV ECHO MEAS - LVOT AREA: 4.4 CM^2
BH CV ECHO MEAS - LVOT DIAM: 2.4 CM
BH CV ECHO MEAS - LVPWD: 0.98 CM
BH CV ECHO MEAS - MR MAX PG: 39 MMHG
BH CV ECHO MEAS - MR MAX VEL: 312.4 CM/SEC
BH CV ECHO MEAS - MV A MAX VEL: 60.2 CM/SEC
BH CV ECHO MEAS - MV DEC SLOPE: 291 CM/SEC^2
BH CV ECHO MEAS - MV E MAX VEL: 106.7 CM/SEC
BH CV ECHO MEAS - MV E/A: 1.8
BH CV ECHO MEAS - MV P1/2T MAX VEL: 105.5 CM/SEC
BH CV ECHO MEAS - MV P1/2T: 106.2 MSEC
BH CV ECHO MEAS - MVA P1/2T LCG: 2.1 CM^2
BH CV ECHO MEAS - MVA(P1/2T): 2.1 CM^2
BH CV ECHO MEAS - PA MAX PG: 4.1 MMHG
BH CV ECHO MEAS - PA V2 MAX: 100.6 CM/SEC
BH CV ECHO MEAS - RAP SYSTOLE: 5 MMHG
BH CV ECHO MEAS - RVDD: 2.6 CM
BH CV ECHO MEAS - RVSP: 32.5 MMHG
BH CV ECHO MEAS - SV(AO): 209.3 ML
BH CV ECHO MEAS - SV(CUBED): 107 ML
BH CV ECHO MEAS - SV(LVOT): 141.7 ML
BH CV ECHO MEAS - SV(TEICH): 88.3 ML
BH CV ECHO MEAS - TR MAX VEL: 262 CM/SEC
BUN BLD-MCNC: 16 MG/DL (ref 7–21)
BUN/CREAT SERPL: 13.4 (ref 7–25)
CALCIUM SPEC-SCNC: 9.4 MG/DL (ref 8.4–10.2)
CHLORIDE SERPL-SCNC: 101 MMOL/L (ref 95–110)
CO2 SERPL-SCNC: 24 MMOL/L (ref 22–31)
CREAT BLD-MCNC: 1.19 MG/DL (ref 0.7–1.3)
GFR SERPL CREATININE-BSD FRML MDRD: 62 ML/MIN/1.73 (ref 49–113)
GLUCOSE BLD-MCNC: 133 MG/DL (ref 60–100)
LV EF 2D ECHO EST: 55 %
POTASSIUM BLD-SCNC: 3.6 MMOL/L (ref 3.5–5.1)
SODIUM BLD-SCNC: 138 MMOL/L (ref 137–145)

## 2017-08-21 PROCEDURE — 93306 TTE W/DOPPLER COMPLETE: CPT | Performed by: INTERNAL MEDICINE

## 2017-08-21 PROCEDURE — 93005 ELECTROCARDIOGRAM TRACING: CPT | Performed by: INTERNAL MEDICINE

## 2017-08-21 PROCEDURE — 93306 TTE W/DOPPLER COMPLETE: CPT

## 2017-08-21 PROCEDURE — 80048 BASIC METABOLIC PNL TOTAL CA: CPT | Performed by: INTERNAL MEDICINE

## 2017-08-21 PROCEDURE — 93010 ELECTROCARDIOGRAM REPORT: CPT | Performed by: INTERNAL MEDICINE

## 2017-08-21 RX ORDER — NEBIVOLOL 5 MG/1
10 TABLET ORAL DAILY
Status: DISCONTINUED | OUTPATIENT
Start: 2017-08-21 | End: 2017-08-22 | Stop reason: HOSPADM

## 2017-08-21 RX ADMIN — VALSARTAN 320 MG: 160 TABLET, FILM COATED ORAL at 09:14

## 2017-08-21 RX ADMIN — NEBIVOLOL HYDROCHLORIDE 10 MG: 5 TABLET ORAL at 20:39

## 2017-08-21 RX ADMIN — TIZANIDINE 4 MG: 4 TABLET ORAL at 20:39

## 2017-08-21 RX ADMIN — FENOFIBRATE 145 MG: 145 TABLET ORAL at 09:14

## 2017-08-21 RX ADMIN — ACETAMINOPHEN 650 MG: 325 TABLET ORAL at 14:53

## 2017-08-21 RX ADMIN — Medication 1 TABLET: at 09:15

## 2017-08-21 RX ADMIN — FAMOTIDINE 20 MG: 20 TABLET ORAL at 18:02

## 2017-08-21 RX ADMIN — FAMOTIDINE 20 MG: 20 TABLET ORAL at 09:15

## 2017-08-21 RX ADMIN — CYANOCOBALAMIN TAB 1000 MCG 2000 MCG: 1000 TAB at 09:13

## 2017-08-21 RX ADMIN — RIVAROXABAN 15 MG: 15 TABLET, FILM COATED ORAL at 06:11

## 2017-08-21 RX ADMIN — RIVAROXABAN 15 MG: 15 TABLET, FILM COATED ORAL at 18:02

## 2017-08-21 RX ADMIN — TRAZODONE HYDROCHLORIDE 50 MG: 50 TABLET ORAL at 20:15

## 2017-08-21 NOTE — PROGRESS NOTES
[facility] Medicine Services  INPATIENT PROGRESS NOTE     LOS: 1 day   Patient Care Team:  Melvi Hernandez MD as PCP - General    Chief Complaint:   Patient is seen for follow-up today.  He voices no complaints and denies chest pain or shortness of air.  He is eager to be discharged.      Subjective     Interval History:     Patient Complaints: As above    History taken from: Patient    Review of Systems:    Review of Systems   Constitutional: Negative for activity change, appetite change, chills, diaphoresis, fatigue and fever.   HENT: Negative for trouble swallowing and voice change.    Eyes: Negative for photophobia and visual disturbance.   Respiratory: Negative for cough, choking, chest tightness, shortness of breath, wheezing and stridor.    Cardiovascular: Negative for chest pain, palpitations and leg swelling.   Gastrointestinal: Negative for abdominal distention, abdominal pain, blood in stool, constipation, diarrhea, nausea and vomiting.   Endocrine: Negative for cold intolerance, heat intolerance, polydipsia, polyphagia and polyuria.   Genitourinary: Negative for decreased urine volume, difficulty urinating, dysuria, enuresis, flank pain, frequency, hematuria and urgency.   Musculoskeletal: Negative for arthralgias, gait problem, myalgias, neck pain and neck stiffness.   Skin: Negative for pallor, rash and wound.   Neurological: Negative for dizziness, tremors, seizures, syncope, facial asymmetry, speech difficulty, weakness, light-headedness, numbness and headaches.   Hematological: Does not bruise/bleed easily.   Psychiatric/Behavioral: Negative for agitation, behavioral problems and confusion.         Objective     Vital Signs  Temp:  [97.3 °F (36.3 °C)-98.1 °F (36.7 °C)] 97.8 °F (36.6 °C)  Heart Rate:  [45-85] 62  Resp:  [17-18] 18  BP: (124-169)/(67-81) 142/70    Physical Exam:   Physical Exam   Constitutional: He is oriented to person, place, and time.  He appears well-developed and well-nourished. No distress.   HENT:   Head: Normocephalic and atraumatic.   Eyes: EOM are normal. Pupils are equal, round, and reactive to light. No scleral icterus.   Neck: Normal range of motion. Neck supple. No JVD present. No thyromegaly present.   Cardiovascular: Normal rate, regular rhythm and normal heart sounds.  Exam reveals no gallop and no friction rub.    No murmur heard.  Pulmonary/Chest: Effort normal and breath sounds normal. He has no wheezes. He has no rales. He exhibits no tenderness.   Abdominal: Soft. Bowel sounds are normal. He exhibits no distension and no mass. There is no tenderness. There is no rebound and no guarding.   Musculoskeletal: He exhibits no edema, tenderness or deformity.   Neurological: He is alert and oriented to person, place, and time. No cranial nerve deficit. He exhibits normal muscle tone. Coordination normal.   Skin: Skin is warm and dry. No rash noted. He is not diaphoretic. No erythema. No pallor.   Psychiatric: He has a normal mood and affect. His behavior is normal. Judgment and thought content normal.   Nursing note and vitals reviewed.         Results Review:       Results from last 7 days  Lab Units 08/20/17  1104   SODIUM mmol/L 139   POTASSIUM mmol/L 3.4*   CHLORIDE mmol/L 101   CO2 mmol/L 24.0   BUN mg/dL 18   CREATININE mg/dL 1.19   GLUCOSE mg/dL 98   CALCIUM mg/dL 9.5   BILIRUBIN mg/dL 1.1   ALK PHOS U/L 50   ALT (SGPT) U/L 61   AST (SGOT) U/L 40               Results from last 7 days  Lab Units 08/20/17  1104   WBC 10*3/mm3 7.93   HEMOGLOBIN g/dL 13.9   HEMATOCRIT % 39.3   PLATELETS 10*3/mm3 204       Lab Results   Component Value Date    CKTOTAL 615 (H) 08/20/2017    CKMB 8.59 (H) 08/20/2017    TROPONINI <0.012 08/20/2017       CO2   Date Value Ref Range Status   08/20/2017 24.0 22.0 - 31.0 mmol/L Final         Results from last 7 days  Lab Units 08/20/17  1104   INR  0.90        Imaging Results (last 7 days)     Procedure  Component Value Units Date/Time    XR Chest 1 View [[id number]] Collected:  08/20/17 1105     Updated:  08/20/17 1130    Narrative:       [facility] [place]    Patient Name: MR. OSITO STRAUSS    ORDERING: DINA URIAS     ATTENDING: DINA URIAS     REFERRING: DINA URIAS    -----------------------    PROCEDURE: Portable chest x-ray    TECHNIQUE: Single AP view of the chest    COMPARISON: 8/20/2017    HISTORY: Chest pain protocol    FINDINGS:     Life-support devices: None    Lungs/pleura: Mild pulmonary vascular congestion. No pneumothorax  or pleural effusion.    Heart, hilar and mediastinal structures:  Normal accounting for  projection and technique      Impression:       CONCLUSION:  Mild pulmonary vascular congestion.    Electronically signed by:  Jose Ricks MD  8/20/2017 11:29 AM  CDT Workstation: [id number]    CT Angiogram Chest With Contrast [[id number]] Collected:  08/20/17 1226     Updated:  08/20/17 1251    Narrative:       Patient Name:  MR. OSITO STRAUSS  Patient ID:  [id number]   Ordering:  DINA URIAS  Attending:  DINA URIAS  Referring:  DINA URIAS  ------------------------------------------------    CT angiogram of the chest with contrast    History: Chest pain    Axial spiral scans of the chest were obtained  with  intravenous  contrast.  Coronal and sagittal reconstructions and both oblique  coronal MIPS obtained the same workstation.    This exam was performed according to our departmental  dose-optimization program, which includes automated exposure  control, adjustment of the mA and/or kV according to patient size  and/or use of iterative reconstruction technique.    DLP: 392.80    Comparison: None.    Bilateral lower lobe segmental and subsegmental emboli.  No mediastinal hematoma.  No hilar, mediastinal or axillary adenopathy.  No thoracic aortic aneurysm or dissection.  No pericardial effusion.    Minimal subsegmental atelectasis or infiltrate  anteriorly and  peripherally in the right lower lobe.  No mass or noncalcified nodule.  No pleural effusion.  No pneumothorax.    Fatty infiltration of the liver.    No acute osseous abnormality.      Impression:       Conclusion:  Bilateral lower lobe segmental and subsegmental emboli.  Minimal subsegmental atelectasis or infiltrate anteriorly and  peripherally in the right lower lobe.  Fatty infiltration of the liver.    Report called at approximately 1:00 PM CST.    [id number]    Electronically signed by:  Sam Mcdonald MD  8/20/2017 12:50 PM  CDT Workstation: boarding pass    US Venous Doppler Lower Extremity Bilateral (duplex) [[id number]] Collected:  08/20/17 1254     Updated:  08/21/17 1029    Narrative:       Patient Name:  MR. OSITO STRAUSS  Patient ID:  [id number]   Ordering:  DINA URIAS  Attending:  DINA URIAS  Referring:  DINA URIAS  ------------------------------------------------    Ultrasound venous duplex bilateral lower extremities    HISTORY: Pulmonary emboli. Evaluate for deep venous thrombosis.    Duplex ultrasound of the deep venous system of each lower  extremity was performed    Real-time images demonstrate normal compressibility without  evidence of intraluminal thrombus.  Doppler shows phasic flow and augmentation.  Color Doppler also reveals venous patency.      Impression:       CONCLUSION:  No ultrasound evidence of deep venous thrombosis of either lower  extremity.    [id number]    Electronically signed by:  Sam Mcdonald MD  8/20/2017 1:28 PM CDT  Workstation: boarding pass                                    Medication Review:   Current Facility-Administered Medications   Medication Dose Route Frequency Provider Last Rate Last Dose   • acetaminophen (TYLENOL) tablet 650 mg  650 mg Oral PRN Kirk Tellez MD   650 mg at 08/20/17 2136   • dicyclomine (BENTYL) tablet 20 mg  20 mg Oral TID PRN Kirk Tellez MD       • famotidine (PEPCID) tablet 20 mg  20 mg Oral BID Kirk  MD Rosalinda   20 mg at 08/21/17 0915   • fenofibrate (TRICOR) tablet 145 mg  145 mg Oral Daily Kirk Tellez MD   145 mg at 08/21/17 0914   • HYDROmorphone (DILAUDID) injection 1 mg  1 mg Intravenous Q2H PRN Kirk Tellez MD   1 mg at 08/20/17 1819   • multivitamin with minerals tablet 1 tablet  1 tablet Oral Daily Kirk Tellez MD   1 tablet at 08/21/17 0915   • nebivolol (BYSTOLIC) tablet 10 mg  10 mg Oral Daily Kirk Tellez MD   10 mg at 08/20/17 1824   • ondansetron (ZOFRAN) injection 4 mg  4 mg Intravenous Q6H PRN Kirk Tellez MD       • rivaroxaban (XARELTO) tablet 15 mg  15 mg Oral BID With Meals Kirk Tellez MD   15 mg at 08/21/17 0611   • sodium chloride 0.9 % flush 1-10 mL  1-10 mL Intravenous PRN Kirk eTllez MD       • sodium chloride 0.9 % flush 10 mL  10 mL Intravenous PRN Jose Luis Arauz MD   10 mL at 08/20/17 1402   • SUMAtriptan (IMITREX) tablet 50 mg  50 mg Oral Once PRN Kirk Tellez MD       • tiZANidine (ZANAFLEX) tablet 4 mg  4 mg Oral Q8H PRN Kirk Tellez MD   4 mg at 08/20/17 2136   • traZODone (DESYREL) tablet 50 mg  50 mg Oral Nightly Kirk Tellez MD   50 mg at 08/20/17 2116   • valsartan (DIOVAN) tablet 320 mg  320 mg Oral Q24H Kirk Tellez MD   320 mg at 08/21/17 0914   • vitamin B-12 (CYANOCOBALAMIN) tablet 2,000 mcg  2,000 mcg Oral Daily Kirk Tellez MD   2,000 mcg at 08/21/17 0913         Assessment/Plan   1.  Bilateral pulmonary embolism: Patient is much improved and mostly symptomatic.  Continue Xarelto.  Echocardiogram is pending.  2.  Hypertension: Stable.  3.  Likely discharge in a.m.      Lambert Live MD  08/21/17      EMR Dragon/Transcription disclaimer:   Much of this encounter note is an electronic transcription/translation of spoken language to printed text. The electronic translation of spoken language may permit erroneous, or at times, nonsensical words or phrases to be inadvertently transcribed; Although I have reviewed the note for such errors,  some may still exist.

## 2017-08-21 NOTE — PLAN OF CARE
Problem: Patient Care Overview (Adult)  Goal: Plan of Care Review  Outcome: Ongoing (interventions implemented as appropriate)    08/21/17 6642   Coping/Psychosocial Response Interventions   Plan Of Care Reviewed With patient   Patient Care Overview   Progress improving       Goal: Adult Individualization and Mutuality  Outcome: Ongoing (interventions implemented as appropriate)  Goal: Discharge Needs Assessment  Outcome: Ongoing (interventions implemented as appropriate)    Problem: Pain, Acute (Adult)  Goal: Identify Related Risk Factors and Signs and Symptoms  Outcome: Outcome(s) achieved Date Met:  08/21/17  Goal: Acceptable Pain Control/Comfort Level  Outcome: Ongoing (interventions implemented as appropriate)

## 2017-08-21 NOTE — PLAN OF CARE
Problem: Patient Care Overview (Adult)  Goal: Plan of Care Review  Outcome: Ongoing (interventions implemented as appropriate)    08/21/17 2017   Coping/Psychosocial Response Interventions   Plan Of Care Reviewed With patient   Patient Care Overview   Progress improving

## 2017-08-22 VITALS
TEMPERATURE: 97.6 F | RESPIRATION RATE: 18 BRPM | OXYGEN SATURATION: 97 % | SYSTOLIC BLOOD PRESSURE: 148 MMHG | BODY MASS INDEX: 26.73 KG/M2 | DIASTOLIC BLOOD PRESSURE: 62 MMHG | HEIGHT: 70 IN | WEIGHT: 186.7 LBS | HEART RATE: 64 BPM

## 2017-08-22 RX ADMIN — RIVAROXABAN 15 MG: 15 TABLET, FILM COATED ORAL at 08:08

## 2017-08-22 RX ADMIN — Medication 1 TABLET: at 08:08

## 2017-08-22 RX ADMIN — VALSARTAN 320 MG: 160 TABLET, FILM COATED ORAL at 08:08

## 2017-08-22 RX ADMIN — FENOFIBRATE 145 MG: 145 TABLET ORAL at 08:08

## 2017-08-22 RX ADMIN — CYANOCOBALAMIN TAB 1000 MCG 2000 MCG: 1000 TAB at 08:08

## 2017-08-22 RX ADMIN — FAMOTIDINE 20 MG: 20 TABLET ORAL at 08:08

## 2017-08-22 NOTE — PLAN OF CARE
Problem: Pain, Acute (Adult)  Goal: Acceptable Pain Control/Comfort Level  Outcome: Outcome(s) achieved Date Met:  08/22/17

## 2017-08-22 NOTE — DISCHARGE SUMMARY
[facility] Medicine Services  DISCHARGE SUMMARY       Date of Admission: 8/20/2017  Date of Discharge:  8/22/2017  Primary Care Physician: Melvi Hernandez MD    Presenting Problem/History of Present Illness:  Other acute pulmonary embolism without acute cor pulmonale [I26.99]  Chest pain, unspecified type [R07.9]       Final Discharge Diagnoses:  Hospital Problem List     * (Principal)Pulmonary embolism without acute cor pulmonale    Insomnia (Chronic)    Peripheral neuropathy (Chronic)    Anxiety (Chronic)    Depression (Chronic)    Hypertension (Chronic)          Consults:   Consults     Date and Time Order Name Status Description    8/20/2017 1306 Hospitalist (on-call MD unless specified)            Pertinent Test Results:   CTA Chest:  Conclusion:  Bilateral lower lobe segmental and subsegmental emboli.  Minimal subsegmental atelectasis or infiltrate anteriorly and  peripherally in the right lower lobe.  Fatty infiltration of the liver.    Lower Extremity Doppler:  Conclusion:  No ultrasound evidence of deep venous thrombosis of either lower  extremity.    Echo:  · Left ventricular systolic function is normal. Estimated EF = 55%.  · Left ventricular diastolic dysfunction (grade I) consistent with impaired relaxation.        Chief Complaint on Day of Discharge:  No complaints    Hospital Course:  The patient is a 62 y.o. male who presented to [facility] with chest pain and shortness of breath.  He was found to have bilateral lower lobe segmental and subsegmental pulmonary emboli.  He was started on Xarelto and did well.  His shortness of breath improved.  Echo showed no right heart strain.  He ambulated in the halls frequently, and without difficulty.  He was discharged home in good condition.      Condition on Discharge:  Good    Physical Exam on Discharge:  /62 (BP Location: Left arm, Patient Position: Lying)  Pulse 64  Temp 97.6 °F (36.4 °C)  "(Temporal Artery )   Resp 18  Ht 70\" (177.8 cm)  Wt 186 lb 11.2 oz (84.7 kg)  SpO2 97%  BMI 26.79 kg/m2     Physical Exam   Constitutional: He appears well-developed and well-nourished.   HENT:   Head: Normocephalic and atraumatic.   Eyes: EOM are normal. Pupils are equal, round, and reactive to light.   Neck: Normal range of motion. Neck supple.   Cardiovascular: Normal rate, regular rhythm, normal heart sounds and intact distal pulses.  Exam reveals no gallop and no friction rub.    No murmur heard.  Pulmonary/Chest: Effort normal and breath sounds normal. No respiratory distress. He has no wheezes. He has no rales. He exhibits no tenderness.   Abdominal: Soft. Bowel sounds are normal. He exhibits no distension. There is no tenderness.   Psychiatric: He has a normal mood and affect. His behavior is normal.   Vitals reviewed.        Discharge Disposition:  Home or Self Care    Discharge Medications:   Ben Abebe   Home Medication Instructions KENNEDY:[id number]    Printed on:08/22/17 1248   Medication Information                      Acetaminophen (TYLENOL EXTRA STRENGTH PO)  Take 1 tablet by mouth As Needed.             celecoxib (CeleBREX) 200 MG capsule  Take 1 capsule by mouth As Needed for Mild Pain (1-3).             dicyclomine (BENTYL) 20 MG tablet  Take 1 tablet by mouth As Needed (bowel problem).             EPINEPHrine (EPIPEN) 0.3 MG/0.3ML solution auto-injector injection  Use as directed             fenofibrate (TRICOR) 145 MG tablet  Take 1 tablet by mouth Daily.             magnesium gluconate (MAGONATE) 500 MG tablet  Take 500 mg by mouth Daily.             Multiple Vitamins-Minerals (CENTRUM SILVER ULTRA MENS PO)  Take 1 tablet by mouth Daily.             nebivolol (BYSTOLIC) 10 MG tablet  Take 1 tablet by mouth Daily.             raNITIdine (ZANTAC) 150 MG tablet  Take 150 mg by mouth Daily As Needed.             rivaroxaban (XARELTO) 15 MG tablet  Take 1 tablet by mouth 2 (Two) " Times a Day With Meals.             SUMAtriptan (IMITREX) 50 MG tablet  Take one tablet at onset of headache. May repeat dose one time in 2 hours if headache not relieved.             tiZANidine (ZANAFLEX) 4 MG tablet  1-2 tabs qhs             traZODone (DESYREL) 50 MG tablet  1 - 2 qhs for sleep             valsartan-hydrochlorothiazide (DIOVAN-HCT) 320-25 MG per tablet  Take 1 tablet by mouth Daily.             vitamin B-12 (CYANOCOBALAMIN) 2500 MCG sublingual tablet tablet  Place 5,000 mcg under the tongue Daily.                 Discharge Diet:    Heart Healthy    Activity at Discharge:   Activity Instructions     Activity as Tolerated                     Follow-up Appointments:   Future Appointments  Date Time Provider Department Center   8/28/2017 11:00 AM Melvi Hernandez MD [facility] FM [facility] None   9/28/2017 11:00 AM Melvi Hernandez MD [facility] FM [facility] None   Anticoagulation clinic 1 week    Test Results Pending at Discharge:         This document has been electronically signed by Zak Barron MD on August 22, 2017 12:41 PM      Time: 35 min

## 2017-08-22 NOTE — DISCHARGE INSTR - LAB
[facility] will call patient to schedule an appointment for Anticoagulation Clinic.

## 2017-08-22 NOTE — PLAN OF CARE
Problem: Patient Care Overview (Adult)  Goal: Adult Individualization and Mutuality  Outcome: Outcome(s) achieved Date Met:  08/22/17

## 2017-08-22 NOTE — PLAN OF CARE
Problem: Patient Care Overview (Adult)  Goal: Discharge Needs Assessment  Outcome: Outcome(s) achieved Date Met:  08/22/17

## 2017-08-22 NOTE — PLAN OF CARE
Problem: Patient Care Overview (Adult)  Goal: Plan of Care Review  Outcome: Outcome(s) achieved Date Met:  08/22/17

## 2017-08-22 NOTE — PLAN OF CARE
Problem: Patient Care Overview (Adult)  Goal: Plan of Care Review  Outcome: Ongoing (interventions implemented as appropriate)    08/22/17 0507   Coping/Psychosocial Response Interventions   Plan Of Care Reviewed With patient   Patient Care Overview   Progress improving       Goal: Adult Individualization and Mutuality  Outcome: Ongoing (interventions implemented as appropriate)  Goal: Discharge Needs Assessment  Outcome: Ongoing (interventions implemented as appropriate)    Problem: Pain, Acute (Adult)  Goal: Acceptable Pain Control/Comfort Level  Outcome: Ongoing (interventions implemented as appropriate)

## 2017-08-23 NOTE — PAYOR COMM NOTE
"Ben Strauss (62 y.o. Male)     Date of Birth Social Security Number Address Home Phone MRN    1955  [id number] [id number] [phone] [id number]    [employer] Marital Status          [employer]        Admission Date Admission Type Admitting Provider Attending Provider Department, Room/Bed    8/20/17 Emergency Roger Grewal MD  [address], [facility]/1    Discharge Date Discharge Disposition Discharge Destination        8/22/2017 Home or Self Care             Attending Provider: (none)    Allergies:  Lyrica [Pregabalin], Ambien [Zolpidem], Bee Venom, Codeine    Isolation:  None   Infection:  None   Code Status:  Prior    Ht:  70\" (177.8 cm)   Wt:  186 lb 11.2 oz (84.7 kg)    Admission Cmt:  None   Principal Problem:  Pulmonary embolism without acute cor pulmonale [I26.99]                 Active Insurance as of 8/20/2017     Primary Coverage     Payor Plan Insurance Group Employer/Plan Group    [facility] [employer] [facility] [facility] [id number]     Payor Plan Address Payor Plan Phone Number Effective From Effective To    [address] [phone] 1/1/2013     [place] [address]       Subscriber Name Subscriber Birth Date Member ID       BEN STRAUSS 1955 [id number]                 Emergency Contacts      (Rel.) Home Phone Work Phone Mobile Phone    Radha Strauss (Spouse) -- -- [phone]               Discharge Summary      Zak Barron MD at 8/22/2017 12:41 PM              [facility] Medicine Services  DISCHARGE SUMMARY       Date of Admission: 8/20/2017  Date of Discharge:  8/22/2017  Primary Care Physician: Melvi Hernandez MD    Presenting Problem/History of Present Illness:  Other acute pulmonary embolism without acute cor pulmonale [I26.99]  Chest pain, unspecified type [R07.9]       Final Discharge Diagnoses:  Hospital Problem List     * " "(Principal)Pulmonary embolism without acute cor pulmonale    Insomnia (Chronic)    Peripheral neuropathy (Chronic)    Anxiety (Chronic)    Depression (Chronic)    Hypertension (Chronic)          Consults:   Consults     Date and Time Order Name Status Description    8/20/2017 1306 Hospitalist (on-call MD unless specified)            Pertinent Test Results:   CTA Chest:  Conclusion:  Bilateral lower lobe segmental and subsegmental emboli.  Minimal subsegmental atelectasis or infiltrate anteriorly and  peripherally in the right lower lobe.  Fatty infiltration of the liver.    Lower Extremity Doppler:  Conclusion:  No ultrasound evidence of deep venous thrombosis of either lower  extremity.    Echo:  · Left ventricular systolic function is normal. Estimated EF = 55%.  · Left ventricular diastolic dysfunction (grade I) consistent with impaired relaxation.        Chief Complaint on Day of Discharge:  No complaints    Hospital Course:  The patient is a 62 y.o. male who presented to [facility] with chest pain and shortness of breath.  He was found to have bilateral lower lobe segmental and subsegmental pulmonary emboli.  He was started on Xarelto and did well.  His shortness of breath improved.  Echo showed no right heart strain.  He ambulated in the halls frequently, and without difficulty.  He was discharged home in good condition.      Condition on Discharge:  Good    Physical Exam on Discharge:  /62 (BP Location: Left arm, Patient Position: Lying)  Pulse 64  Temp 97.6 °F (36.4 °C) (Temporal Artery )   Resp 18  Ht 70\" (177.8 cm)  Wt 186 lb 11.2 oz (84.7 kg)  SpO2 97%  BMI 26.79 kg/m2     Physical Exam   Constitutional: He appears well-developed and well-nourished.   HENT:   Head: Normocephalic and atraumatic.   Eyes: EOM are normal. Pupils are equal, round, and reactive to light.   Neck: Normal range of motion. Neck supple.   Cardiovascular: Normal rate, regular rhythm, normal heart sounds " and intact distal pulses.  Exam reveals no gallop and no friction rub.    No murmur heard.  Pulmonary/Chest: Effort normal and breath sounds normal. No respiratory distress. He has no wheezes. He has no rales. He exhibits no tenderness.   Abdominal: Soft. Bowel sounds are normal. He exhibits no distension. There is no tenderness.   Psychiatric: He has a normal mood and affect. His behavior is normal.   Vitals reviewed.        Discharge Disposition:  Home or Self Care    Discharge Medications:   Ben Abebe   Home Medication Instructions KENNEDY:[id number]    Printed on:08/22/17 9136   Medication Information                      Acetaminophen (TYLENOL EXTRA STRENGTH PO)  Take 1 tablet by mouth As Needed.             celecoxib (CeleBREX) 200 MG capsule  Take 1 capsule by mouth As Needed for Mild Pain (1-3).             dicyclomine (BENTYL) 20 MG tablet  Take 1 tablet by mouth As Needed (bowel problem).             EPINEPHrine (EPIPEN) 0.3 MG/0.3ML solution auto-injector injection  Use as directed             fenofibrate (TRICOR) 145 MG tablet  Take 1 tablet by mouth Daily.             magnesium gluconate (MAGONATE) 500 MG tablet  Take 500 mg by mouth Daily.             Multiple Vitamins-Minerals (CENTRUM SILVER ULTRA MENS PO)  Take 1 tablet by mouth Daily.             nebivolol (BYSTOLIC) 10 MG tablet  Take 1 tablet by mouth Daily.             raNITIdine (ZANTAC) 150 MG tablet  Take 150 mg by mouth Daily As Needed.             rivaroxaban (XARELTO) 15 MG tablet  Take 1 tablet by mouth 2 (Two) Times a Day With Meals.             SUMAtriptan (IMITREX) 50 MG tablet  Take one tablet at onset of headache. May repeat dose one time in 2 hours if headache not relieved.             tiZANidine (ZANAFLEX) 4 MG tablet  1-2 tabs qhs             traZODone (DESYREL) 50 MG tablet  1 - 2 qhs for sleep             valsartan-hydrochlorothiazide (DIOVAN-HCT) 320-25 MG per tablet  Take 1 tablet by mouth Daily.             vitamin  B-12 (CYANOCOBALAMIN) 2500 MCG sublingual tablet tablet  Place 5,000 mcg under the tongue Daily.                 Discharge Diet:    Heart Healthy    Activity at Discharge:   Activity Instructions     Activity as Tolerated                     Follow-up Appointments:   Future Appointments  Date Time Provider Department Center   8/28/2017 11:00 AM Melvi Hernandez MD [facility] None   9/28/2017 11:00 AM Melvi Hernandez MD [facility] None   Anticoagulation clinic 1 week    Test Results Pending at Discharge:         This document has been electronically signed by Zak Barron MD on August 22, 2017 12:41 PM      Time: 35 min                 Electronically signed by Zak Barron MD at 8/22/2017  1:00 PM

## 2017-08-28 ENCOUNTER — OFFICE VISIT (OUTPATIENT)
Dept: FAMILY MEDICINE CLINIC | Facility: CLINIC | Age: 62
End: 2017-08-28

## 2017-08-28 VITALS
DIASTOLIC BLOOD PRESSURE: 80 MMHG | OXYGEN SATURATION: 98 % | HEIGHT: 70 IN | BODY MASS INDEX: 27.47 KG/M2 | SYSTOLIC BLOOD PRESSURE: 140 MMHG | WEIGHT: 191.9 LBS | HEART RATE: 62 BPM

## 2017-08-28 DIAGNOSIS — I26.99 BILATERAL PULMONARY EMBOLISM (HCC): Primary | ICD-10-CM

## 2017-08-28 PROCEDURE — 99213 OFFICE O/P EST LOW 20 MIN: CPT | Performed by: GENERAL PRACTICE

## 2017-08-28 RX ORDER — TRAZODONE HYDROCHLORIDE 50 MG/1
TABLET ORAL
Qty: 60 TABLET | Refills: 5 | Status: SHIPPED | OUTPATIENT
Start: 2017-08-28 | End: 2017-09-28 | Stop reason: SDUPTHER

## 2017-08-28 NOTE — PROGRESS NOTES
Subjective   Ben Abebe is a 62 y.o. male.   Chief Complaint   Patient presents with   • Follow-up     hoslpiltal ravindra blood clots in lungs     History of Present Illness     Recent hospitalization for shortness of breath, found to have bilateral PE, no DVT. Started on Xarelto. Echo was normal. No cause found. Labs and xrays reviewed. Medications reviewed and reconciled. Has appt with vascular for further evaluation. Shortness of breath has improved. Does have a brother who had a DVT.    The following portions of the patient's history were reviewed and updated as appropriate: allergies, current medications, past social history and problem list.    Outpatient Medications Prior to Visit   Medication Sig Dispense Refill   • Acetaminophen (TYLENOL EXTRA STRENGTH PO) Take 1 tablet by mouth As Needed.     • celecoxib (CeleBREX) 200 MG capsule Take 1 capsule by mouth As Needed for Mild Pain (1-3). 90 capsule 3   • dicyclomine (BENTYL) 20 MG tablet Take 1 tablet by mouth As Needed (bowel problem). 30 tablet 5   • EPINEPHrine (EPIPEN) 0.3 MG/0.3ML solution auto-injector injection Use as directed     • fenofibrate (TRICOR) 145 MG tablet Take 1 tablet by mouth Daily. 90 tablet 3   • magnesium gluconate (MAGONATE) 500 MG tablet Take 500 mg by mouth Daily.     • Multiple Vitamins-Minerals (CENTRUM SILVER ULTRA MENS PO) Take 1 tablet by mouth Daily.     • nebivolol (BYSTOLIC) 10 MG tablet Take 1 tablet by mouth Daily. 90 tablet 3   • raNITIdine (ZANTAC) 150 MG tablet Take 150 mg by mouth Daily As Needed.     • rivaroxaban (XARELTO) 15 MG tablet Take 1 tablet by mouth 2 (Two) Times a Day With Meals. 42 tablet 0   • SUMAtriptan (IMITREX) 50 MG tablet Take one tablet at onset of headache. May repeat dose one time in 2 hours if headache not relieved. 9 tablet 2   • tiZANidine (ZANAFLEX) 4 MG tablet 1-2 tabs qhs 180 tablet 3   • traZODone (DESYREL) 50 MG tablet 1 - 2 qhs for sleep 60 tablet 2   •  "valsartan-hydrochlorothiazide (DIOVAN-HCT) 320-25 MG per tablet Take 1 tablet by mouth Daily. 90 tablet 3   • vitamin B-12 (CYANOCOBALAMIN) 2500 MCG sublingual tablet tablet Place 5,000 mcg under the tongue Daily.       No facility-administered medications prior to visit.      Review of Systems   Constitutional: Negative.  Negative for chills, fatigue, fever and unexpected weight change.   HENT: Negative.  Negative for congestion, ear pain, hearing loss, nosebleeds, rhinorrhea, sneezing, sore throat and tinnitus.    Eyes: Negative.  Negative for discharge.   Respiratory: Negative.  Negative for cough, shortness of breath and wheezing.    Cardiovascular: Negative.  Negative for chest pain and palpitations.   Gastrointestinal: Negative.  Negative for abdominal pain, constipation, diarrhea, nausea and vomiting.   Endocrine: Negative.    Genitourinary: Negative.  Negative for dysuria, frequency and urgency.   Musculoskeletal: Negative.  Negative for arthralgias, back pain, joint swelling, myalgias and neck pain.   Skin: Negative.  Negative for rash.   Allergic/Immunologic: Negative.    Neurological: Negative.  Negative for dizziness, weakness, numbness and headaches.   Hematological: Negative.  Negative for adenopathy.   Psychiatric/Behavioral: Negative.  Negative for dysphoric mood and sleep disturbance. The patient is not nervous/anxious.      Objective   Visit Vitals   • /80   • Pulse 62   • Ht 70\" (177.8 cm)   • Wt 191 lb 14.4 oz (87 kg)   • SpO2 98%   • BMI 27.53 kg/m2     Physical Exam   Constitutional: He is oriented to person, place, and time. He appears well-developed and well-nourished. No distress.   HENT:   Head: Normocephalic.   Nose: Nose normal.   Mouth/Throat: Oropharynx is clear and moist.   Eyes: Conjunctivae and EOM are normal. Pupils are equal, round, and reactive to light. Right eye exhibits no discharge. Left eye exhibits no discharge.   Neck: No thyromegaly present.   Cardiovascular: Normal " rate, regular rhythm, normal heart sounds and intact distal pulses.    No murmur heard.  Pulmonary/Chest: Effort normal and breath sounds normal.   Musculoskeletal: He exhibits no edema.   Lymphadenopathy:     He has no cervical adenopathy.   Neurological: He is alert and oriented to person, place, and time.   Skin: Skin is warm and dry.   Psychiatric: He has a normal mood and affect.   Nursing note and vitals reviewed.    Assessment/Plan   Problem List Items Addressed This Visit     None      Visit Diagnoses     Bilateral pulmonary embolism    -  Primary          Follow up as scheduled.    No orders of the defined types were placed in this encounter.    Return for Next scheduled follow up.

## 2017-09-01 ENCOUNTER — OFFICE VISIT (OUTPATIENT)
Dept: FAMILY MEDICINE CLINIC | Facility: CLINIC | Age: 62
End: 2017-09-01

## 2017-09-01 VITALS
HEART RATE: 70 BPM | HEIGHT: 70 IN | SYSTOLIC BLOOD PRESSURE: 154 MMHG | WEIGHT: 191 LBS | BODY MASS INDEX: 27.35 KG/M2 | DIASTOLIC BLOOD PRESSURE: 74 MMHG | OXYGEN SATURATION: 96 %

## 2017-09-01 DIAGNOSIS — R19.01 RIGHT UPPER QUADRANT ABDOMINAL MASS: Primary | ICD-10-CM

## 2017-09-01 PROCEDURE — 99212 OFFICE O/P EST SF 10 MIN: CPT | Performed by: GENERAL PRACTICE

## 2017-09-01 NOTE — PROGRESS NOTES
Subjective   Ben Abebe is a 62 y.o. male.   Chief Complaint   Patient presents with   • Follow-up     eval place on rt rib area     History of Present Illness     Noticed a bulge under right ribcage 2 days ago, when he takes a deep breath. Has never noticed this before.  Recently treated for bilateral PE and worried it might be related.  CT scan did show evidence of a fatty liver.    The following portions of the patient's history were reviewed and updated as appropriate: allergies, current medications, past social history and problem list.    Outpatient Medications Prior to Visit   Medication Sig Dispense Refill   • Acetaminophen (TYLENOL EXTRA STRENGTH PO) Take 1 tablet by mouth As Needed.     • celecoxib (CeleBREX) 200 MG capsule Take 1 capsule by mouth As Needed for Mild Pain (1-3). 90 capsule 3   • dicyclomine (BENTYL) 20 MG tablet Take 1 tablet by mouth As Needed (bowel problem). 30 tablet 5   • EPINEPHrine (EPIPEN) 0.3 MG/0.3ML solution auto-injector injection Use as directed     • fenofibrate (TRICOR) 145 MG tablet Take 1 tablet by mouth Daily. 90 tablet 3   • magnesium gluconate (MAGONATE) 500 MG tablet Take 500 mg by mouth Daily.     • Multiple Vitamins-Minerals (CENTRUM SILVER ULTRA MENS PO) Take 1 tablet by mouth Daily.     • nebivolol (BYSTOLIC) 10 MG tablet Take 1 tablet by mouth Daily. 90 tablet 3   • raNITIdine (ZANTAC) 150 MG tablet Take 150 mg by mouth Daily As Needed.     • rivaroxaban (XARELTO) 15 MG tablet Take 1 tablet by mouth 2 (Two) Times a Day With Meals. 42 tablet 0   • SUMAtriptan (IMITREX) 50 MG tablet Take one tablet at onset of headache. May repeat dose one time in 2 hours if headache not relieved. 9 tablet 2   • tiZANidine (ZANAFLEX) 4 MG tablet 1-2 tabs qhs 180 tablet 3   • traZODone (DESYREL) 50 MG tablet 1 - 2 qhs for sleep 60 tablet 5   • valsartan-hydrochlorothiazide (DIOVAN-HCT) 320-25 MG per tablet Take 1 tablet by mouth Daily. 90 tablet 3   • vitamin B-12  "(CYANOCOBALAMIN) 2500 MCG sublingual tablet tablet Place 5,000 mcg under the tongue Daily.       No facility-administered medications prior to visit.        Review of Systems   Constitutional: Negative.  Negative for chills, fatigue, fever and unexpected weight change.   HENT: Negative.  Negative for congestion, ear pain, hearing loss, nosebleeds, rhinorrhea, sneezing, sore throat and tinnitus.    Eyes: Negative.  Negative for discharge.   Respiratory: Negative.  Negative for cough, shortness of breath and wheezing.    Cardiovascular: Negative.  Negative for chest pain and palpitations.   Gastrointestinal: Negative.  Negative for abdominal pain, constipation, diarrhea, nausea and vomiting.   Endocrine: Negative.    Genitourinary: Negative.  Negative for dysuria, frequency and urgency.   Musculoskeletal: Negative.  Negative for arthralgias, back pain, joint swelling, myalgias and neck pain.   Skin: Negative.  Negative for rash.   Allergic/Immunologic: Negative.    Neurological: Negative.  Negative for dizziness, weakness, numbness and headaches.   Hematological: Negative.  Negative for adenopathy.   Psychiatric/Behavioral: Negative.  Negative for dysphoric mood and sleep disturbance. The patient is not nervous/anxious.        Objective   Visit Vitals   • /74   • Pulse 70   • Ht 70\" (177.8 cm)   • Wt 191 lb (86.6 kg)   • SpO2 96%   • BMI 27.41 kg/m2     Physical Exam   Constitutional: He is oriented to person, place, and time. He appears well-developed and well-nourished. No distress.   HENT:   Head: Normocephalic.   Nose: Nose normal.   Mouth/Throat: Oropharynx is clear and moist.   Eyes: Conjunctivae and EOM are normal. Pupils are equal, round, and reactive to light. Right eye exhibits no discharge. Left eye exhibits no discharge.   Neck: No thyromegaly present.   Cardiovascular: Normal rate, regular rhythm, normal heart sounds and intact distal pulses.    No murmur heard.  Pulmonary/Chest: Effort normal and " breath sounds normal.   Abdominal:       Musculoskeletal: He exhibits no edema.   Lymphadenopathy:     He has no cervical adenopathy.   Neurological: He is alert and oriented to person, place, and time.   Skin: Skin is warm and dry.   Psychiatric: He has a normal mood and affect.   Nursing note and vitals reviewed.    Assessment/Plan   Problem List Items Addressed This Visit     None      Visit Diagnoses     Right upper quadrant abdominal mass    -  Primary    normal variation          Patient was reassured, not associated with his pulmonary embolus and is has most likely always been present.    No orders of the defined types were placed in this encounter.    No Follow-up on file.

## 2017-09-14 ENCOUNTER — OFFICE VISIT (OUTPATIENT)
Dept: CARDIAC SURGERY | Facility: CLINIC | Age: 62
End: 2017-09-14

## 2017-09-14 ENCOUNTER — LAB (OUTPATIENT)
Dept: LAB | Facility: HOSPITAL | Age: 62
End: 2017-09-14

## 2017-09-14 VITALS
BODY MASS INDEX: 27.92 KG/M2 | WEIGHT: 195 LBS | HEIGHT: 70 IN | TEMPERATURE: 96.8 F | HEART RATE: 58 BPM | SYSTOLIC BLOOD PRESSURE: 142 MMHG | OXYGEN SATURATION: 99 % | DIASTOLIC BLOOD PRESSURE: 73 MMHG

## 2017-09-14 DIAGNOSIS — Z00.00 ANNUAL PHYSICAL EXAM: ICD-10-CM

## 2017-09-14 DIAGNOSIS — Z79.01 LONG TERM CURRENT USE OF ANTICOAGULANT: ICD-10-CM

## 2017-09-14 DIAGNOSIS — Z12.5 ENCOUNTER FOR PROSTATE CANCER SCREENING: ICD-10-CM

## 2017-09-14 DIAGNOSIS — I26.99 OTHER ACUTE PULMONARY EMBOLISM WITHOUT ACUTE COR PULMONALE (HCC): Primary | ICD-10-CM

## 2017-09-14 DIAGNOSIS — M15.9 DEGENERATIVE JOINT DISEASE INVOLVING MULTIPLE JOINTS ON BOTH SIDES OF BODY: ICD-10-CM

## 2017-09-14 DIAGNOSIS — E78.2 MIXED HYPERLIPIDEMIA: ICD-10-CM

## 2017-09-14 DIAGNOSIS — I26.99 OTHER ACUTE PULMONARY EMBOLISM WITHOUT ACUTE COR PULMONALE (HCC): ICD-10-CM

## 2017-09-14 LAB
ALBUMIN SERPL-MCNC: 4.7 G/DL (ref 3.4–4.8)
ALBUMIN/GLOB SERPL: 1.8 G/DL (ref 1.1–1.8)
ALP SERPL-CCNC: 40 U/L (ref 38–126)
ALT SERPL W P-5'-P-CCNC: 61 U/L (ref 21–72)
ANION GAP SERPL CALCULATED.3IONS-SCNC: 12 MMOL/L (ref 5–15)
ARTICHOKE IGE QN: 136 MG/DL (ref 1–129)
AST SERPL-CCNC: 46 U/L (ref 17–59)
BASOPHILS # BLD AUTO: 0.01 10*3/MM3 (ref 0–0.2)
BASOPHILS NFR BLD AUTO: 0.2 % (ref 0–2)
BILIRUB SERPL-MCNC: 1.2 MG/DL (ref 0.2–1.3)
BILIRUB UR QL STRIP: NEGATIVE
BUN BLD-MCNC: 20 MG/DL (ref 7–21)
BUN/CREAT SERPL: 15.5 (ref 7–25)
CALCIUM SPEC-SCNC: 9.9 MG/DL (ref 8.4–10.2)
CHLORIDE SERPL-SCNC: 98 MMOL/L (ref 95–110)
CHOLEST SERPL-MCNC: 205 MG/DL (ref 0–199)
CLARITY UR: CLEAR
CO2 SERPL-SCNC: 23 MMOL/L (ref 22–31)
COLOR UR: YELLOW
CREAT BLD-MCNC: 1.29 MG/DL (ref 0.7–1.3)
DEPRECATED RDW RBC AUTO: 40.4 FL (ref 35.1–43.9)
EOSINOPHIL # BLD AUTO: 0.03 10*3/MM3 (ref 0–0.7)
EOSINOPHIL NFR BLD AUTO: 0.6 % (ref 0–7)
ERYTHROCYTE [DISTWIDTH] IN BLOOD BY AUTOMATED COUNT: 12.5 % (ref 11.5–14.5)
GFR SERPL CREATININE-BSD FRML MDRD: 56 ML/MIN/1.73 (ref 60–113)
GLOBULIN UR ELPH-MCNC: 2.6 GM/DL (ref 2.3–3.5)
GLUCOSE BLD-MCNC: 94 MG/DL (ref 60–100)
GLUCOSE UR STRIP-MCNC: NEGATIVE MG/DL
HCT VFR BLD AUTO: 38.8 % (ref 39–49)
HDLC SERPL-MCNC: 32 MG/DL (ref 60–200)
HGB BLD-MCNC: 13.5 G/DL (ref 13.7–17.3)
HGB UR QL STRIP.AUTO: NEGATIVE
IMM GRANULOCYTES # BLD: 0 10*3/MM3 (ref 0–0.02)
IMM GRANULOCYTES NFR BLD: 0 % (ref 0–0.5)
KETONES UR QL STRIP: NEGATIVE
LDLC/HDLC SERPL: 3.76 {RATIO} (ref 0–3.55)
LEUKOCYTE ESTERASE UR QL STRIP.AUTO: NEGATIVE
LYMPHOCYTES # BLD AUTO: 0.97 10*3/MM3 (ref 0.6–4.2)
LYMPHOCYTES NFR BLD AUTO: 21 % (ref 10–50)
MCH RBC QN AUTO: 30.8 PG (ref 26.5–34)
MCHC RBC AUTO-ENTMCNC: 34.8 G/DL (ref 31.5–36.3)
MCV RBC AUTO: 88.4 FL (ref 80–98)
MONOCYTES # BLD AUTO: 0.34 10*3/MM3 (ref 0–0.9)
MONOCYTES NFR BLD AUTO: 7.3 % (ref 0–12)
NEUTROPHILS # BLD AUTO: 3.28 10*3/MM3 (ref 2–8.6)
NEUTROPHILS NFR BLD AUTO: 70.9 % (ref 37–80)
NITRITE UR QL STRIP: NEGATIVE
PH UR STRIP.AUTO: 6.5 [PH] (ref 5–9)
PLATELET # BLD AUTO: 199 10*3/MM3 (ref 150–450)
PMV BLD AUTO: 10.4 FL (ref 8–12)
POTASSIUM BLD-SCNC: 3.6 MMOL/L (ref 3.5–5.1)
PROT SERPL-MCNC: 7.3 G/DL (ref 6.3–8.6)
PROT UR QL STRIP: NEGATIVE
PSA SERPL-MCNC: 0.93 NG/ML (ref 0–4)
RBC # BLD AUTO: 4.39 10*6/MM3 (ref 4.37–5.74)
SODIUM BLD-SCNC: 133 MMOL/L (ref 137–145)
SP GR UR STRIP: 1.01 (ref 1–1.03)
TRIGL SERPL-MCNC: 264 MG/DL (ref 20–199)
UROBILINOGEN UR QL STRIP: NORMAL
WBC NRBC COR # BLD: 4.63 10*3/MM3 (ref 3.2–9.8)

## 2017-09-14 PROCEDURE — 81003 URINALYSIS AUTO W/O SCOPE: CPT | Performed by: GENERAL PRACTICE

## 2017-09-14 PROCEDURE — 80053 COMPREHEN METABOLIC PANEL: CPT | Performed by: GENERAL PRACTICE

## 2017-09-14 PROCEDURE — 80061 LIPID PANEL: CPT | Performed by: GENERAL PRACTICE

## 2017-09-14 PROCEDURE — 83090 ASSAY OF HOMOCYSTEINE: CPT | Performed by: NURSE PRACTITIONER

## 2017-09-14 PROCEDURE — 81241 F5 GENE: CPT | Performed by: NURSE PRACTITIONER

## 2017-09-14 PROCEDURE — 36415 COLL VENOUS BLD VENIPUNCTURE: CPT

## 2017-09-14 PROCEDURE — 81240 F2 GENE: CPT | Performed by: NURSE PRACTITIONER

## 2017-09-14 PROCEDURE — 99203 OFFICE O/P NEW LOW 30 MIN: CPT | Performed by: NURSE PRACTITIONER

## 2017-09-14 PROCEDURE — G0103 PSA SCREENING: HCPCS | Performed by: GENERAL PRACTICE

## 2017-09-14 PROCEDURE — 85025 COMPLETE CBC W/AUTO DIFF WBC: CPT | Performed by: GENERAL PRACTICE

## 2017-09-14 NOTE — PATIENT INSTRUCTIONS
Length of therapy: 6 mos    Complete Acute Dosing ( 15mg twice daily). Then start maintenance dosing ( 20mg daily)    Obtain Partial - Hypercoagulable panel today: Office will call Abnormal results    CTA Chest: 6 mos ( Office will schedule and call)

## 2017-09-15 LAB — HCYS SERPL-SCNC: 15.9 UMOL/L (ref 0–15)

## 2017-09-19 LAB
F5 GENE MUT ANL BLD/T: NORMAL
FACTOR V COMMENT: NORMAL

## 2017-09-20 LAB
F2 GENE MUT ANL BLD/T: NORMAL
Lab: NORMAL

## 2017-09-20 NOTE — PROGRESS NOTES
Subjective   Patient ID: Ben Abebe is a 62 y.o. male is being seen for consultation today at the request of Dr. Barron for anticoagulation management, dosing, teaching. PE Evaluation, management.    History of Present Illness  The following portions of the patient's history were reviewed and updated as appropriate: allergies, current medications, past family history, past medical history, past social history, past surgical history and problem list.  PCP: David  Card: Carl  The patient is a 62 y.o. male who presented to [facility] with chest pain and shortness of breath.  He was found to have bilateral lower lobe segmental and subsegmental pulmonary emboli.  He was started on Xarelto and did well.  His shortness of breath improved.  Echo showed no right heart strain.  He ambulated in the halls frequently, and without difficulty.  He was discharged home in good condition.  Presents for anticoagulation management, teaching, and dosing.     8/20/17: CTA Chest: Bilateral lower lobe segmental and subsegmental emboli.  Minimal subsegmental atelectasis or infiltrate anteriorly and  peripherally in the right lower lobe.     Results for orders placed during the hospital encounter of 08/20/17   Adult Transthoracic Echo Complete    Narrative · Left ventricular systolic function is normal. Estimated EF = 55%.  · Left ventricular diastolic dysfunction (grade I) consistent with   impaired relaxation.                Past Medical History:   Diagnosis Date   • Anxiety    • Benign prostatic hyperplasia    • Cobalamin deficiency    • Depression    • Diverticular disease of colon    • Hypercholesterolemia    • Hypertension    • Insomnia    • Irritable bowel syndrome    • Migraine    • Osteoarthritis    • Peripheral neuropathy    • Restless legs      Past Surgical History:   Procedure Laterality Date   • COLECTOMY PARTIAL / TOTAL  11/2009   • LUMBAR DISC SURGERY  2000       Lyrica [pregabalin]; Ambien  [zolpidem]; Bee venom; and Codeine    Current Outpatient Prescriptions:   •  Acetaminophen (TYLENOL EXTRA STRENGTH PO), Take 1 tablet by mouth As Needed., Disp: , Rfl:   •  celecoxib (CeleBREX) 200 MG capsule, Take 1 capsule by mouth As Needed for Mild Pain (1-3)., Disp: 90 capsule, Rfl: 3  •  dicyclomine (BENTYL) 20 MG tablet, Take 1 tablet by mouth As Needed (bowel problem)., Disp: 30 tablet, Rfl: 5  •  EPINEPHrine (EPIPEN) 0.3 MG/0.3ML solution auto-injector injection, Use as directed, Disp: , Rfl:   •  fenofibrate (TRICOR) 145 MG tablet, Take 1 tablet by mouth Daily., Disp: 90 tablet, Rfl: 3  •  magnesium gluconate (MAGONATE) 500 MG tablet, Take 500 mg by mouth Daily., Disp: , Rfl:   •  Multiple Vitamins-Minerals (CENTRUM SILVER ULTRA MENS PO), Take 1 tablet by mouth Daily., Disp: , Rfl:   •  nebivolol (BYSTOLIC) 10 MG tablet, Take 1 tablet by mouth Daily., Disp: 90 tablet, Rfl: 3  •  raNITIdine (ZANTAC) 150 MG tablet, Take 150 mg by mouth Daily As Needed., Disp: , Rfl:   •  rivaroxaban (XARELTO) 15 MG tablet, Take 1 tablet by mouth 2 (Two) Times a Day With Meals., Disp: 10 tablet, Rfl: 0  •  SUMAtriptan (IMITREX) 50 MG tablet, Take one tablet at onset of headache. May repeat dose one time in 2 hours if headache not relieved., Disp: 9 tablet, Rfl: 2  •  tiZANidine (ZANAFLEX) 4 MG tablet, 1-2 tabs qhs, Disp: 180 tablet, Rfl: 3  •  traZODone (DESYREL) 50 MG tablet, 1 - 2 qhs for sleep, Disp: 60 tablet, Rfl: 5  •  valsartan-hydrochlorothiazide (DIOVAN-HCT) 320-25 MG per tablet, Take 1 tablet by mouth Daily., Disp: 90 tablet, Rfl: 3  •  vitamin B-12 (CYANOCOBALAMIN) 2500 MCG sublingual tablet tablet, Place 5,000 mcg under the tongue Daily., Disp: , Rfl:   •  rivaroxaban (XARELTO) 20 MG tablet, Take 1 tablet by mouth Daily With Dinner., Disp: 30 tablet, Rfl: 5    Review of Systems   Constitution: Negative for weakness.   HENT: Negative for nosebleeds.    Eyes: Negative for visual disturbance.   Cardiovascular:  Negative for leg swelling.   Respiratory: Negative for hemoptysis and shortness of breath.    Hematologic/Lymphatic: Negative for bleeding problem. Does not bruise/bleed easily.   Gastrointestinal: Negative for hematemesis and melena.   Genitourinary: Negative for hematuria.   Neurological: Negative for dizziness and light-headedness.   All other systems reviewed and are negative.       Objective       Vitals:    09/14/17 1123   BP: 142/73   Pulse: 58   Temp: 96.8 °F (36 °C)   SpO2: 99%       Physical Exam   Constitutional: He is oriented to person, place, and time. He appears well-developed.   HENT:   Head: Normocephalic.   Neck: Neck supple. Carotid bruit is not present.   Cardiovascular: Intact distal pulses.  Bradycardia present.    Pulmonary/Chest: Effort normal and breath sounds normal.   Abdominal: Soft. Bowel sounds are normal.   Musculoskeletal: Normal range of motion. He exhibits no edema.   Neurological: He is alert and oriented to person, place, and time.   Skin: Skin is warm and dry.   Psychiatric: He has a normal mood and affect. Thought content normal.   Vitals reviewed.    Lab Results   Component Value Date    WBC 4.63 09/14/2017    HGB 13.5 (L) 09/14/2017    HCT 38.8 (L) 09/14/2017    MCV 88.4 09/14/2017     09/14/2017     Lab Results   Component Value Date    GLUCOSE 94 09/14/2017    BUN 20 09/14/2017    CREATININE 1.29 09/14/2017    EGFRIFNONA 56 (L) 09/14/2017    BCR 15.5 09/14/2017    K 3.6 09/14/2017    CO2 23.0 09/14/2017    CALCIUM 9.9 09/14/2017    ALBUMIN 4.70 09/14/2017    LABIL2 1.8 09/14/2017    AST 46 09/14/2017    ALT 61 09/14/2017           Assessment/Plan   Independent Review of Radiographic Studies:    Detailed discussion regarding risks, benefits, and treatment plan.  Patient understands, agrees, and wishes to proceed with plan.     1. Long term current use of anticoagulant  Length of therapy: 6 mos    Complete Acute Dosing ( 15mg twice daily). Then start maintenance dosing  ( 20mg daily)    Anticoagulation teaching/discussion for 30 minutes of direct face-face interaction with the patient during this encounter and over half of that time was spent on counseling and coordination of care.  We discussed in depth the importance of medication adherence, signs/symptoms of bleeding, and management of anticoagulation for procedures. I also educated the patient about assistance programs available for cost reduction of the medication prescribed.     2. Other acute pulmonary embolism without acute cor pulmonale  CTA Chest: 6 mos ( Office will schedule and call)  Obtain Partial - Hypercoagulable panel today: Office will call Abnormal results  - Factor II, DNA Analysis; Future  - Factor 5 Leiden; Future  - Homocysteine, serum; Future            This document has been electronically signed by MAITE Chris on September 20, 2017 2:10 PM

## 2017-09-28 ENCOUNTER — OFFICE VISIT (OUTPATIENT)
Dept: FAMILY MEDICINE CLINIC | Facility: CLINIC | Age: 62
End: 2017-09-28

## 2017-09-28 VITALS
DIASTOLIC BLOOD PRESSURE: 70 MMHG | HEIGHT: 70 IN | SYSTOLIC BLOOD PRESSURE: 132 MMHG | WEIGHT: 191.3 LBS | HEART RATE: 61 BPM | BODY MASS INDEX: 27.39 KG/M2 | OXYGEN SATURATION: 98 %

## 2017-09-28 DIAGNOSIS — I10 ESSENTIAL HYPERTENSION: Chronic | ICD-10-CM

## 2017-09-28 DIAGNOSIS — Z00.00 ANNUAL PHYSICAL EXAM: Primary | ICD-10-CM

## 2017-09-28 DIAGNOSIS — E78.2 MIXED HYPERLIPIDEMIA: ICD-10-CM

## 2017-09-28 PROCEDURE — 99396 PREV VISIT EST AGE 40-64: CPT | Performed by: GENERAL PRACTICE

## 2017-09-28 RX ORDER — TRAZODONE HYDROCHLORIDE 50 MG/1
TABLET ORAL
Qty: 180 TABLET | Refills: 3 | Status: SHIPPED | OUTPATIENT
Start: 2017-09-28 | End: 2018-09-10 | Stop reason: SDUPTHER

## 2017-11-10 ENCOUNTER — FLU SHOT (OUTPATIENT)
Dept: FAMILY MEDICINE CLINIC | Facility: CLINIC | Age: 62
End: 2017-11-10

## 2017-11-10 DIAGNOSIS — Z23 NEED FOR IMMUNIZATION AGAINST INFLUENZA: Primary | ICD-10-CM

## 2017-11-10 PROCEDURE — 90471 IMMUNIZATION ADMIN: CPT | Performed by: GENERAL PRACTICE

## 2017-11-10 PROCEDURE — 90686 IIV4 VACC NO PRSV 0.5 ML IM: CPT | Performed by: GENERAL PRACTICE

## 2017-11-27 RX ORDER — SUMATRIPTAN 50 MG/1
50 TABLET, FILM COATED ORAL
Qty: 9 TABLET | Refills: 2 | Status: SHIPPED | OUTPATIENT
Start: 2017-11-27 | End: 2019-10-14

## 2018-02-06 DIAGNOSIS — I26.99 OTHER PULMONARY EMBOLISM WITHOUT ACUTE COR PULMONALE, UNSPECIFIED CHRONICITY (HCC): Primary | ICD-10-CM

## 2018-02-08 ENCOUNTER — LAB (OUTPATIENT)
Dept: LAB | Facility: HOSPITAL | Age: 63
End: 2018-02-08

## 2018-02-08 DIAGNOSIS — I10 ESSENTIAL HYPERTENSION: Chronic | ICD-10-CM

## 2018-02-08 LAB
ANION GAP SERPL CALCULATED.3IONS-SCNC: 15 MMOL/L (ref 5–15)
BUN BLD-MCNC: 16 MG/DL (ref 7–21)
BUN/CREAT SERPL: 13.3 (ref 7–25)
CALCIUM SPEC-SCNC: 9.5 MG/DL (ref 8.4–10.2)
CHLORIDE SERPL-SCNC: 103 MMOL/L (ref 95–110)
CO2 SERPL-SCNC: 24 MMOL/L (ref 22–31)
CREAT BLD-MCNC: 1.2 MG/DL (ref 0.7–1.3)
GFR SERPL CREATININE-BSD FRML MDRD: 61 ML/MIN/1.73 (ref 60–113)
GLUCOSE BLD-MCNC: 104 MG/DL (ref 60–100)
POTASSIUM BLD-SCNC: 4.6 MMOL/L (ref 3.5–5.1)
SODIUM BLD-SCNC: 142 MMOL/L (ref 137–145)

## 2018-02-08 PROCEDURE — 80048 BASIC METABOLIC PNL TOTAL CA: CPT | Performed by: NURSE PRACTITIONER

## 2018-02-08 PROCEDURE — 36415 COLL VENOUS BLD VENIPUNCTURE: CPT | Performed by: PHYSICIAN ASSISTANT

## 2018-02-16 ENCOUNTER — HOSPITAL ENCOUNTER (OUTPATIENT)
Dept: CT IMAGING | Facility: HOSPITAL | Age: 63
Discharge: HOME | End: 2018-02-16
Admitting: NURSE PRACTITIONER

## 2018-02-16 ENCOUNTER — APPOINTMENT (OUTPATIENT)
Dept: CT IMAGING | Facility: HOSPITAL | Age: 63
End: 2018-02-16

## 2018-02-16 PROCEDURE — 71275 CT ANGIOGRAPHY CHEST: CPT

## 2018-02-16 PROCEDURE — 0 IOPAMIDOL PER 1 ML: Performed by: NURSE PRACTITIONER

## 2018-02-16 RX ADMIN — IOPAMIDOL 68 ML: 755 INJECTION, SOLUTION INTRAVENOUS at 14:02

## 2018-02-28 ENCOUNTER — OFFICE VISIT (OUTPATIENT)
Dept: CARDIAC SURGERY | Facility: CLINIC | Age: 63
End: 2018-02-28

## 2018-02-28 VITALS
BODY MASS INDEX: 26.92 KG/M2 | HEIGHT: 70 IN | WEIGHT: 188 LBS | OXYGEN SATURATION: 99 % | SYSTOLIC BLOOD PRESSURE: 138 MMHG | DIASTOLIC BLOOD PRESSURE: 70 MMHG | HEART RATE: 72 BPM | TEMPERATURE: 96.8 F

## 2018-02-28 DIAGNOSIS — Z79.01 LONG TERM CURRENT USE OF ANTICOAGULANT: Primary | ICD-10-CM

## 2018-02-28 PROBLEM — I26.99 PULMONARY EMBOLISM WITHOUT ACUTE COR PULMONALE: Status: RESOLVED | Noted: 2017-08-20 | Resolved: 2018-02-28

## 2018-02-28 PROCEDURE — 99213 OFFICE O/P EST LOW 20 MIN: CPT | Performed by: NURSE PRACTITIONER

## 2018-02-28 RX ORDER — VALSARTAN AND HYDROCHLOROTHIAZIDE 320; 25 MG/1; MG/1
TABLET, FILM COATED ORAL
Qty: 90 TABLET | Refills: 2 | Status: SHIPPED | OUTPATIENT
Start: 2018-02-28 | End: 2018-11-18 | Stop reason: SDUPTHER

## 2018-02-28 RX ORDER — FENOFIBRATE 145 MG/1
TABLET, COATED ORAL
Qty: 90 TABLET | Refills: 2 | Status: SHIPPED | OUTPATIENT
Start: 2018-02-28 | End: 2018-11-18 | Stop reason: SDUPTHER

## 2018-02-28 RX ORDER — ASPIRIN 81 MG/1
81 TABLET ORAL DAILY
Start: 2018-02-28 | End: 2019-02-28

## 2018-02-28 RX ORDER — NEBIVOLOL HYDROCHLORIDE 10 MG/1
TABLET ORAL
Qty: 90 TABLET | Refills: 2 | Status: SHIPPED | OUTPATIENT
Start: 2018-02-28 | End: 2019-01-08

## 2018-02-28 NOTE — PATIENT INSTRUCTIONS
Pulmonary Embolus- Resolved  May discontinue Xarelto  Obtain additional Hypercoagulable workup - Approx March 19th week.  Office will call results.     Follow up As Needed

## 2018-02-28 NOTE — PROGRESS NOTES
Subjective   Patient ID: Ben Abebe is a 62 y.o. male is being seen for follow up.    Chief Complaint   Patient presents with   • Pulmonary Embolism     CT results       History of Present Illness  The following portions of the patient's history were reviewed and updated as appropriate: allergies, current medications, past family history, past medical history, past social history, past surgical history and problem list.  PCP: David  Card: Carl  The patient is a 62 y.o. male who presented to [facility] with chest pain and shortness of breath.  He was found to have bilateral lower lobe segmental and subsegmental pulmonary emboli.  He was started on Xarelto and did well.  His shortness of breath improved.  Echo showed no right heart strain.  He ambulated in the halls frequently, and without difficulty.  He was discharged home in good condition.  Presents for anticoagulation follow up management, teaching, and dosing.     8/20/17: CTA Chest: Bilateral lower lobe segmental and subsegmental emboli.  Minimal subsegmental atelectasis or infiltrate anteriorly and  peripherally in the right lower lobe.     2/16/18L CTA Chest:          Homocysteine, serum   Order: [id number]   Status:  Final result   Visible to patient:  Yes (MyChart) Dx:  Other acute pulmonary embolism withou...      Ref Range & Units 5mo ago     Homocystine, Plasma (Quant) 0.0 - 15.0 umol/L 15.9 (H)   Resulting Agency  [facility]   Narrative   Performed at:  01 - [facility] [address]  [id number]  : Faheem Mallory PhD, Phone:  [phone]                Factor 5 Leiden   Order: [id number]   Status:  Final result   Visible to patient:  Yes ([employer]) Dx:  Other acute pulmonary embolism withou...      5mo ago     Factor V Leiden Comment   Comments: Result:  Negative (no mutation found)            Factor II, DNA Analysis   Order: [id number]   Status:  Final result   Visible to patient:  Yes ([employer]) Dx:   Other acute pulmonary embolism withou...      5mo ago     Factor II, DNA Analysis Comment   Comments: NEGATIVE                Past Medical History:   Diagnosis Date   • Anxiety    • Benign prostatic hyperplasia    • Cobalamin deficiency    • Depression    • Diverticular disease of colon    • Hypercholesterolemia    • Hypertension    • Insomnia    • Irritable bowel syndrome    • Migraine    • Osteoarthritis    • Peripheral neuropathy    • Restless legs      Past Surgical History:   Procedure Laterality Date   • COLECTOMY PARTIAL / TOTAL  11/2009   • LUMBAR DISC SURGERY  2000       Lyrica [pregabalin]; Ambien [zolpidem]; Bee venom; and Codeine    Current Outpatient Prescriptions:   •  Acetaminophen (TYLENOL EXTRA STRENGTH PO), Take 1 tablet by mouth As Needed., Disp: , Rfl:   •  celecoxib (CeleBREX) 200 MG capsule, Take 1 capsule by mouth As Needed for Mild Pain (1-3)., Disp: 90 capsule, Rfl: 3  •  dicyclomine (BENTYL) 20 MG tablet, Take 1 tablet by mouth As Needed (bowel problem)., Disp: 30 tablet, Rfl: 5  •  EPINEPHrine (EPIPEN) 0.3 MG/0.3ML solution auto-injector injection, Use as directed, Disp: , Rfl:   •  fenofibrate (TRICOR) 145 MG tablet, Take 1 tablet by mouth Daily., Disp: 90 tablet, Rfl: 3  •  magnesium gluconate (MAGONATE) 500 MG tablet, Take 500 mg by mouth Daily., Disp: , Rfl:   •  Multiple Vitamins-Minerals (CENTRUM SILVER ULTRA MENS PO), Take 1 tablet by mouth Daily., Disp: , Rfl:   •  nebivolol (BYSTOLIC) 10 MG tablet, Take 1 tablet by mouth Daily., Disp: 90 tablet, Rfl: 3  •  raNITIdine (ZANTAC) 150 MG tablet, Take 150 mg by mouth Daily As Needed., Disp: , Rfl:   •  SUMAtriptan (IMITREX) 50 MG tablet, Take 1 tablet by mouth Every 2 (Two) Hours As Needed for Migraine., Disp: 9 tablet, Rfl: 2  •  tiZANidine (ZANAFLEX) 4 MG tablet, 1-2 tabs qhs, Disp: 180 tablet, Rfl: 3  •  traZODone (DESYREL) 50 MG tablet, 1 - 2 qhs for sleep, Disp: 180 tablet, Rfl: 3  •  vitamin B-12 (CYANOCOBALAMIN) 2500 MCG  sublingual tablet tablet, Place 5,000 mcg under the tongue Daily., Disp: , Rfl:   •  valsartan-hydrochlorothiazide (DIOVAN-HCT) 320-25 MG per tablet, Take 1 tablet by mouth Daily., Disp: 90 tablet, Rfl: 3    Review of Systems   Constitution: Negative for weakness.   HENT: Negative for nosebleeds.    Eyes: Negative for visual disturbance.   Cardiovascular: Negative for leg swelling.   Respiratory: Negative for hemoptysis and shortness of breath.    Hematologic/Lymphatic: Negative for bleeding problem. Does not bruise/bleed easily.   Gastrointestinal: Negative for hematemesis and melena.   Genitourinary: Negative for hematuria.   Neurological: Negative for dizziness and light-headedness.   All other systems reviewed and are negative.       Objective   Temp:  [96.8 °F (36 °C)] 96.8 °F (36 °C)  Heart Rate:  [72] 72  BP: (138)/(70) 138/70   Vitals:    02/28/18 1422   BP: 138/70   Pulse: 72   Temp: 96.8 °F (36 °C)   SpO2: 99%       Physical Exam   Constitutional: He is oriented to person, place, and time. He appears well-developed.   HENT:   Head: Normocephalic.   Neck: Neck supple. Carotid bruit is not present.   Cardiovascular: Normal rate and intact distal pulses.    Pulmonary/Chest: Effort normal and breath sounds normal.   Abdominal: Soft. Bowel sounds are normal.   Musculoskeletal: Normal range of motion. He exhibits no edema.   Neurological: He is alert and oriented to person, place, and time.   Skin: Skin is warm and dry.   Psychiatric: He has a normal mood and affect. Thought content normal.   Vitals reviewed.      Lab Results   Component Value Date    GLUCOSE 104 (H) 02/08/2018    BUN 16 02/08/2018    CREATININE 1.20 02/08/2018    EGFRIFNONA 61 02/08/2018    BCR 13.3 02/08/2018    K 4.6 02/08/2018    CO2 24.0 02/08/2018    CALCIUM 9.5 02/08/2018    ALBUMIN 4.70 09/14/2017    LABIL2 1.8 09/14/2017    AST 46 09/14/2017    ALT 61 09/14/2017           Assessment/Plan   Independent Review of Radiographic Studies:     Detailed discussion regarding risks, benefits, and treatment plan.  Patient understands, agrees, and wishes to proceed with plan.     1. Long term current use of anticoagulant  Pulmonary Embolus- Resolved  May discontinue Xarelto  Start ASA 81mg Daily  Obtain additional Hypercoagulable workup - Approx March 19th week.  Will Re-Eval Homocysteine for sight elevation on previous findings.   Office will call results.   - Protein C & S Antigens; Future  - Protein C & S, Functional; Future  - Antithrombin III; Future  - Anticardiolipin Antibody, IgG / M, Qn; Future  - APC Resistance; Future  - Homocysteine, serum; Future    Follow up As Needed            This document has been electronically signed by MAITE Chris on February 28, 2018 2:45 PM

## 2018-03-19 ENCOUNTER — LAB (OUTPATIENT)
Dept: LAB | Facility: HOSPITAL | Age: 63
End: 2018-03-19

## 2018-03-19 DIAGNOSIS — Z79.01 LONG TERM CURRENT USE OF ANTICOAGULANT: ICD-10-CM

## 2018-03-19 PROCEDURE — 83090 ASSAY OF HOMOCYSTEINE: CPT

## 2018-03-19 PROCEDURE — 36415 COLL VENOUS BLD VENIPUNCTURE: CPT

## 2018-03-19 PROCEDURE — 85303 CLOT INHIBIT PROT C ACTIVITY: CPT

## 2018-03-19 PROCEDURE — 85307 ASSAY ACTIVATED PROTEIN C: CPT

## 2018-03-19 PROCEDURE — 85300 ANTITHROMBIN III ACTIVITY: CPT

## 2018-03-19 PROCEDURE — 85302 CLOT INHIBIT PROT C ANTIGEN: CPT

## 2018-03-19 PROCEDURE — 86147 CARDIOLIPIN ANTIBODY EA IG: CPT

## 2018-03-19 PROCEDURE — 85306 CLOT INHIBIT PROT S FREE: CPT

## 2018-03-19 PROCEDURE — 85305 CLOT INHIBIT PROT S TOTAL: CPT

## 2018-03-20 LAB
AT III PPP CHRO-ACNC: 130 % (ref 75–135)
CARDIOLIPIN IGG SER IA-ACNC: <9 GPL U/ML (ref 0–14)
CARDIOLIPIN IGM SER IA-ACNC: <9 MPL U/ML (ref 0–12)
HCYS SERPL-SCNC: 17.9 UMOL/L (ref 0–15)

## 2018-03-21 LAB
APCR PPP: 2.8 RATIO (ref 2.2–3.5)
PROTEIN C-FUNCTIONAL: 194 % (ref 73–180)
PROTEIN S-FUNCTIONAL: 130 % (ref 63–140)

## 2018-03-22 LAB
PROT C AG PPP IA-ACNC: 156 % (ref 60–150)
PROT S FREE PPP-ACNC: 179 % (ref 57–157)
PROT S PPP-ACNC: 90 % (ref 60–150)

## 2018-03-23 ENCOUNTER — DOCUMENTATION (OUTPATIENT)
Dept: CARDIAC SURGERY | Facility: CLINIC | Age: 63
End: 2018-03-23

## 2018-03-23 NOTE — PROGRESS NOTES
Mr. Abebe called by phone and notified of lab abnormality of homocysteine level elevation. Folic Acid 1mg/d and ASA 81mg was advised. Dr. Hernandez also notified of results and plan. All other hypercoagulable studies negative for increased incidence for clotting.     Homocysteine, serum   Order: [id number]   Status:  Final result   Visible to patient:  Yes (MyChart)   Dx:  Long term current use of anticoagulant    Ref Range & Units 4d ago   Homocystine, Plasma (Quant) 0.0 - 15.0 umol/L 17.9     Resulting Agency  [facility]   Narrative     Performed at:  01 - [facility] [address]  [id number]  : Faheem Mallory PhD, Phone:  [phone]      Specimen Collected: 03/19/18 08:10 Last Resulted: 03/20/18 06:13                              This document has been electronically signed by MAITE Chris on March 23, 2018 3:11 PM

## 2018-03-28 ENCOUNTER — OFFICE VISIT (OUTPATIENT)
Dept: FAMILY MEDICINE CLINIC | Facility: CLINIC | Age: 63
End: 2018-03-28

## 2018-03-28 VITALS
BODY MASS INDEX: 27.52 KG/M2 | OXYGEN SATURATION: 98 % | DIASTOLIC BLOOD PRESSURE: 78 MMHG | SYSTOLIC BLOOD PRESSURE: 160 MMHG | HEIGHT: 70 IN | HEART RATE: 62 BPM | WEIGHT: 192.2 LBS

## 2018-03-28 DIAGNOSIS — I10 ESSENTIAL HYPERTENSION: Primary | Chronic | ICD-10-CM

## 2018-03-28 PROCEDURE — 99213 OFFICE O/P EST LOW 20 MIN: CPT | Performed by: GENERAL PRACTICE

## 2018-03-28 RX ORDER — FOLIC ACID 1 MG/1
1 TABLET ORAL DAILY
COMMUNITY

## 2018-03-28 RX ORDER — AMLODIPINE BESYLATE 5 MG/1
5 TABLET ORAL NIGHTLY
Qty: 90 TABLET | Refills: 3 | Status: SHIPPED | OUTPATIENT
Start: 2018-03-28 | End: 2018-05-04 | Stop reason: SDUPTHER

## 2018-03-28 NOTE — PROGRESS NOTES
Subjective   Ben Abebe is a 62 y.o. male.   Chief Complaint   Patient presents with   • Follow-up   • Hypertension     For review and evaluation of management of chronic medical problems. Blood pressure has been running high at home, has had a little headache.   Hypertension   This is a chronic problem. The current episode started more than 1 year ago. The problem is unchanged. The problem is uncontrolled. Pertinent negatives include no chest pain, headaches, neck pain, palpitations or shortness of breath. There are no associated agents to hypertension. Current antihypertension treatment includes beta blockers, angiotensin blockers and diuretics. The current treatment provides moderate improvement. There are no compliance problems.       The following portions of the patient's history were reviewed and updated as appropriate: allergies, current medications, past social history and problem list.    Outpatient Medications Prior to Visit   Medication Sig Dispense Refill   • Acetaminophen (TYLENOL EXTRA STRENGTH PO) Take 1 tablet by mouth As Needed.     • aspirin 81 MG EC tablet Take 1 tablet by mouth Daily.     • BYSTOLIC 10 MG tablet TAKE 1 TABLET BY MOUTH DAILY 90 tablet 2   • celecoxib (CeleBREX) 200 MG capsule Take 1 capsule by mouth As Needed for Mild Pain (1-3). 90 capsule 3   • dicyclomine (BENTYL) 20 MG tablet Take 1 tablet by mouth As Needed (bowel problem). 30 tablet 5   • EPINEPHrine (EPIPEN) 0.3 MG/0.3ML solution auto-injector injection Use as directed     • fenofibrate (TRICOR) 145 MG tablet TAKE 1 TABLET BY MOUTH DAILY 90 tablet 2   • magnesium gluconate (MAGONATE) 500 MG tablet Take 500 mg by mouth Daily.     • Multiple Vitamins-Minerals (CENTRUM SILVER ULTRA MENS PO) Take 1 tablet by mouth Daily.     • raNITIdine (ZANTAC) 150 MG tablet Take 150 mg by mouth Daily As Needed.     • SUMAtriptan (IMITREX) 50 MG tablet Take 1 tablet by mouth Every 2 (Two) Hours As Needed for Migraine. 9 tablet 2  "  • tiZANidine (ZANAFLEX) 4 MG tablet 1-2 tabs qhs 180 tablet 3   • traZODone (DESYREL) 50 MG tablet 1 - 2 qhs for sleep 180 tablet 3   • valsartan-hydrochlorothiazide (DIOVAN-HCT) 320-25 MG per tablet TAKE 1 TABLET BY MOUTH DAILY 90 tablet 2   • vitamin B-12 (CYANOCOBALAMIN) 2500 MCG sublingual tablet tablet Place 5,000 mcg under the tongue Daily.       No facility-administered medications prior to visit.        Review of Systems   Constitutional: Negative.  Negative for chills, fatigue, fever and unexpected weight change.   HENT: Negative.  Negative for congestion, ear pain, hearing loss, nosebleeds, rhinorrhea, sneezing, sore throat and tinnitus.    Eyes: Negative.  Negative for discharge.   Respiratory: Negative.  Negative for cough, shortness of breath and wheezing.    Cardiovascular: Negative.  Negative for chest pain and palpitations.   Gastrointestinal: Negative.  Negative for abdominal pain, constipation, diarrhea, nausea and vomiting.   Endocrine: Negative.    Genitourinary: Negative.  Negative for dysuria, frequency and urgency.   Musculoskeletal: Negative.  Negative for arthralgias, back pain, joint swelling, myalgias and neck pain.   Skin: Negative.  Negative for rash.   Allergic/Immunologic: Negative.    Neurological: Negative.  Negative for dizziness, weakness, numbness and headaches.   Hematological: Negative.  Negative for adenopathy.   Psychiatric/Behavioral: Negative.  Negative for dysphoric mood and sleep disturbance. The patient is not nervous/anxious.        Objective   Visit Vitals  /78 (BP Location: Left arm, Patient Position: Sitting, Cuff Size: Adult)   Pulse 62   Ht 177.8 cm (70\")   Wt 87.2 kg (192 lb 3.2 oz)   SpO2 98%   BMI 27.58 kg/m²     Physical Exam   Constitutional: He is oriented to person, place, and time. He appears well-developed and well-nourished. No distress.   HENT:   Head: Normocephalic.   Nose: Nose normal.   Mouth/Throat: Oropharynx is clear and moist.   Eyes: " Conjunctivae and EOM are normal. Pupils are equal, round, and reactive to light. Right eye exhibits no discharge. Left eye exhibits no discharge.   Neck: No thyromegaly present.   Cardiovascular: Normal rate, regular rhythm, normal heart sounds and intact distal pulses.    No murmur heard.  Pulmonary/Chest: Effort normal and breath sounds normal.   Musculoskeletal: He exhibits no edema.   Lymphadenopathy:     He has no cervical adenopathy.   Neurological: He is alert and oriented to person, place, and time.   Skin: Skin is warm and dry.   Psychiatric: He has a normal mood and affect.   Nursing note and vitals reviewed.    Assessment/Plan   Problem List Items Addressed This Visit        Cardiovascular and Mediastinum    Essential hypertension - Primary (Chronic)    Relevant Medications    amLODIPine (NORVASC) 5 MG tablet      Other Visit Diagnoses    None.        Add amlodipine. Discussed the patient's BMI with him. BMI is above normal parameters. Follow-up plan includes:  exercise counseling and nutrition counseling.      New Medications Ordered This Visit   Medications   • folic acid (FOLVITE) 1 MG tablet     Sig: Take 1 mg by mouth Daily.   • amLODIPine (NORVASC) 5 MG tablet     Sig: Take 1 tablet by mouth Every Night.     Dispense:  90 tablet     Refill:  3     Return in about 6 weeks (around 5/9/2018) for Recheck.

## 2018-05-04 ENCOUNTER — OFFICE VISIT (OUTPATIENT)
Dept: FAMILY MEDICINE CLINIC | Facility: CLINIC | Age: 63
End: 2018-05-04

## 2018-05-04 VITALS
WEIGHT: 187.7 LBS | DIASTOLIC BLOOD PRESSURE: 74 MMHG | BODY MASS INDEX: 26.87 KG/M2 | SYSTOLIC BLOOD PRESSURE: 134 MMHG | HEIGHT: 70 IN | OXYGEN SATURATION: 98 % | HEART RATE: 64 BPM

## 2018-05-04 DIAGNOSIS — Z12.5 ENCOUNTER FOR PROSTATE CANCER SCREENING: ICD-10-CM

## 2018-05-04 DIAGNOSIS — I10 ESSENTIAL HYPERTENSION: Primary | Chronic | ICD-10-CM

## 2018-05-04 PROCEDURE — 99212 OFFICE O/P EST SF 10 MIN: CPT | Performed by: GENERAL PRACTICE

## 2018-05-04 RX ORDER — AMLODIPINE BESYLATE 5 MG/1
5 TABLET ORAL NIGHTLY
Qty: 90 TABLET | Refills: 3 | Status: SHIPPED | OUTPATIENT
Start: 2018-05-04 | End: 2019-01-08

## 2018-05-04 NOTE — PROGRESS NOTES
Subjective   Ben Abebe is a 63 y.o. male.   Chief Complaint   Patient presents with   • Hypertension     For review and evaluation of management of chronic medical problems. Blood pressure is improved.   Hypertension   This is a chronic problem. The current episode started more than 1 year ago. The problem is unchanged. The problem is controlled. Pertinent negatives include no chest pain, headaches, neck pain, palpitations or shortness of breath. There are no associated agents to hypertension. Current antihypertension treatment includes beta blockers, angiotensin blockers, diuretics and calcium channel blockers. The current treatment provides moderate improvement. There are no compliance problems.       The following portions of the patient's history were reviewed and updated as appropriate: allergies, current medications, past social history and problem list.    Outpatient Medications Prior to Visit   Medication Sig Dispense Refill   • Acetaminophen (TYLENOL EXTRA STRENGTH PO) Take 1 tablet by mouth As Needed.     • aspirin 81 MG EC tablet Take 1 tablet by mouth Daily.     • BYSTOLIC 10 MG tablet TAKE 1 TABLET BY MOUTH DAILY 90 tablet 2   • celecoxib (CeleBREX) 200 MG capsule Take 1 capsule by mouth As Needed for Mild Pain (1-3). 90 capsule 3   • dicyclomine (BENTYL) 20 MG tablet Take 1 tablet by mouth 4 (Four) Times a Day As Needed (bowel problem). TAKE 1 q 6-8 HOURS AS NEEDED FOR BOWEL PROBLEM 90 tablet 1   • EPINEPHrine (EPIPEN) 0.3 MG/0.3ML solution auto-injector injection Use as directed     • fenofibrate (TRICOR) 145 MG tablet TAKE 1 TABLET BY MOUTH DAILY 90 tablet 2   • folic acid (FOLVITE) 1 MG tablet Take 1 mg by mouth Daily.     • magnesium gluconate (MAGONATE) 500 MG tablet Take 500 mg by mouth Daily.     • Multiple Vitamins-Minerals (CENTRUM SILVER ULTRA MENS PO) Take 1 tablet by mouth Daily.     • raNITIdine (ZANTAC) 150 MG tablet Take 150 mg by mouth Daily As Needed.     • SUMAtriptan  "(IMITREX) 50 MG tablet Take 1 tablet by mouth Every 2 (Two) Hours As Needed for Migraine. 9 tablet 2   • tiZANidine (ZANAFLEX) 4 MG tablet 1-2 tabs qhs 180 tablet 3   • traZODone (DESYREL) 50 MG tablet 1 - 2 qhs for sleep 180 tablet 3   • valsartan-hydrochlorothiazide (DIOVAN-HCT) 320-25 MG per tablet TAKE 1 TABLET BY MOUTH DAILY 90 tablet 2   • vitamin B-12 (CYANOCOBALAMIN) 2500 MCG sublingual tablet tablet Place 5,000 mcg under the tongue Daily.     • amLODIPine (NORVASC) 5 MG tablet Take 1 tablet by mouth Every Night. 90 tablet 3     No facility-administered medications prior to visit.        Review of Systems   Constitutional: Negative.  Negative for chills, fatigue, fever and unexpected weight change.   HENT: Negative.  Negative for congestion, ear pain, hearing loss, nosebleeds, rhinorrhea, sneezing, sore throat and tinnitus.    Eyes: Negative.  Negative for discharge.   Respiratory: Negative.  Negative for cough, shortness of breath and wheezing.    Cardiovascular: Negative.  Negative for chest pain and palpitations.   Gastrointestinal: Negative.  Negative for abdominal pain, constipation, diarrhea, nausea and vomiting.   Endocrine: Negative.    Genitourinary: Negative.  Negative for dysuria, frequency and urgency.   Musculoskeletal: Negative.  Negative for arthralgias, back pain, joint swelling, myalgias and neck pain.   Skin: Negative.  Negative for rash.   Allergic/Immunologic: Negative.    Neurological: Negative.  Negative for dizziness, weakness, numbness and headaches.   Hematological: Negative.  Negative for adenopathy.   Psychiatric/Behavioral: Negative.  Negative for dysphoric mood and sleep disturbance. The patient is not nervous/anxious.        Objective   Visit Vitals  /74   Pulse 64   Ht 177.8 cm (70\")   Wt 85.1 kg (187 lb 11.2 oz)   SpO2 98%   BMI 26.93 kg/m²     Physical Exam   Constitutional: He is oriented to person, place, and time. He appears well-developed and well-nourished. No " distress.   HENT:   Head: Normocephalic.   Nose: Nose normal.   Mouth/Throat: Oropharynx is clear and moist.   Eyes: Conjunctivae and EOM are normal. Pupils are equal, round, and reactive to light. Right eye exhibits no discharge. Left eye exhibits no discharge.   Neck: No thyromegaly present.   Cardiovascular: Normal rate, regular rhythm, normal heart sounds and intact distal pulses.    No murmur heard.  Pulmonary/Chest: Effort normal and breath sounds normal.   Musculoskeletal: He exhibits no edema.   Lymphadenopathy:     He has no cervical adenopathy.   Neurological: He is alert and oriented to person, place, and time.   Skin: Skin is warm and dry.   Psychiatric: He has a normal mood and affect.   Nursing note and vitals reviewed.    Assessment/Plan   Problem List Items Addressed This Visit        Cardiovascular and Mediastinum    Essential hypertension - Primary (Chronic)    Relevant Medications    amLODIPine (NORVASC) 5 MG tablet    Other Relevant Orders    Comprehensive Metabolic Panel    Lipid Panel    Urinalysis With / Culture If Indicated - Urine, Clean Catch      Other Visit Diagnoses     Encounter for prostate cancer screening        Relevant Orders    PSA Screen          Continue current treatment.     New Medications Ordered This Visit   Medications   • amLODIPine (NORVASC) 5 MG tablet     Sig: Take 1 tablet by mouth Every Night.     Dispense:  90 tablet     Refill:  3     Return in about 5 months (around 10/1/2018) for Annual physical.

## 2018-05-29 ENCOUNTER — PRIOR AUTHORIZATION (OUTPATIENT)
Dept: FAMILY MEDICINE CLINIC | Facility: CLINIC | Age: 63
End: 2018-05-29

## 2018-06-26 ENCOUNTER — CLINICAL SUPPORT (OUTPATIENT)
Dept: AUDIOLOGY | Facility: CLINIC | Age: 63
End: 2018-06-26

## 2018-06-26 DIAGNOSIS — Z71.89 ENCOUNTER FOR HEARING AID CONSULTATION: ICD-10-CM

## 2018-06-26 DIAGNOSIS — H90.3 SENSORINEURAL HEARING LOSS, BILATERAL: Primary | ICD-10-CM

## 2018-06-26 PROCEDURE — 92567 TYMPANOMETRY: CPT | Performed by: AUDIOLOGIST

## 2018-06-26 PROCEDURE — 92557 COMPREHENSIVE HEARING TEST: CPT | Performed by: AUDIOLOGIST

## 2018-06-28 NOTE — PROGRESS NOTES
HEARING AID EVALUATION WITH AUDIOGRAM    Name:  Ben Abebe  :  1955  Age:  63 y.o.  Date of Evaluation:  2018      HISTORY    Reason for visit:  Ben Abebe is seen today for a hearing test and hearing aid evaluation at the request of himself .  Patient reports that he got hearing aids approximately five years ago.  He reports that he did not care for the sound quality of the hearing aids.  He reports trouble hearing over the past 10 years or so.  He reports difficulty hearing in background noise.  He reports trouble hearing the TV.  He reports constant bilateral tinnitus. He reports a history of noise exposure with use of hearing protection recently.    Hearing aid history:  Patient currently does not wear hearing aids.    EVALUATION    See Audiogram    RESULTS:    Otoscopy and Tympanometry 226 Hz :  Right Ear:  Otoscopy:  Clear ear canal          Tympanometry:  Middle ear function within normal limits    Left Ear:   Otoscopy:  Clear ear canal        Tympanometry:  Middle ear function within normal limits    Test technique:  Standard Audiometry     Pure Tone Audiometry:   Patient responded to pure tones at 15-40 dB for 250-8000 Hz in right ear, and at 10-50 dB for 250-8000 Hz in left ear.       Speech Audiometry:        Right Ear:  Speech Reception Threshold (SRT) was obtained at 10 dBHL                 Speech Discrimination scores were 100% in quiet when words were presented at 55 dBHL       Left Ear:  Speech Reception Threshold (SRT) was obtained at 20 dBHL                 Speech Discrimination scores were 100% in quiet when words were presented at 65 dBHL    Reliability:   good    IMPRESSIONS:  1.  Tympanometry results are consistent with Middle ear function within normal limits in both ears.  2.  Pure tone results are consistent with within normal limits to moderate sloping sensorineural hearing loss for both ears.       RECOMMENDATIONS:  Amplification options were discussed.   During the Hearing Aid discussion, Mr. Abebe has chosen 2  in the Canal (MONISHA) hearing aid(s) for both ears.  The hearing aid recommended is from [employer] with the [employer]  digital circuit. The cost of this hearing aid is $2300.00 per aid for a total of $4600.00  Patient's insurance will be billed for the cost of the hearing aid(s).        It was a pleasure seeing Ben Abebe in Audiology today.  I look forward to helping Mr. Abebe with his amplification needs.          This document has been electronically signed by JALYN Cruz on June 28, 2018 2:46 PM      JALYN Cruz  Licensed Audiologist

## 2018-07-10 ENCOUNTER — CLINICAL SUPPORT (OUTPATIENT)
Dept: AUDIOLOGY | Facility: CLINIC | Age: 63
End: 2018-07-10

## 2018-07-10 ENCOUNTER — TELEPHONE (OUTPATIENT)
Dept: FAMILY MEDICINE CLINIC | Facility: CLINIC | Age: 63
End: 2018-07-10

## 2018-07-10 DIAGNOSIS — Z46.1 ENCOUNTER FOR FITTING OR ADJUSTMENT OF HEARING AID: Primary | ICD-10-CM

## 2018-07-10 PROCEDURE — V5257 HEARING AID, DIGIT, MON, BTE: HCPCS | Performed by: AUDIOLOGIST

## 2018-07-10 RX ORDER — TIZANIDINE 4 MG/1
TABLET ORAL
Qty: 180 TABLET | Refills: 3 | Status: SHIPPED | OUTPATIENT
Start: 2018-07-10 | End: 2019-06-19 | Stop reason: SDUPTHER

## 2018-07-10 NOTE — TELEPHONE ENCOUNTER
----- Message from Ben Abebe sent at 7/10/2018  7:50 AM CDT -----  Regarding: Prescription Question  Contact: [phone]  Dr. Hernandez    I am out of refills for Tizanidine. Would you please send a new prescription to [facility].    Any problems or questions I can also be contacted at [phone].    Thanks    Ben Abebe

## 2018-07-10 NOTE — PROGRESS NOTES
HEARING AID FITTING    Name:  Ben Abebe  :  1955  Age:  63 y.o.  Date of Evaluation:  7/10/2018      HISTORY    Reason for visit:  Ben Abebe is seen today for a hearing aid fitting.  The hearing aids to be fit are  in the Canal (MONISHA) hearing aids from [employer] with the [employer] B  digital circuit.    Right Hearing Aid Serial Number: [id number]  Left Hearing Aid Serial Number: [id number]      Warranty Expiration:  's warranty extends through 2020 which covers repairs, loss and damage.    Battery Size:  312    The hearing aids were fit to Mr. Abebe's ears.  Patient reported the fit was comfortable and the sound was good.  The hearing aids were set on 80% of his prescription target.  The patient's occlusion Patient was instructed on use and care of the hearing instruments.  The necessary paperwork was signed.  All questions were answered at this time.  The cost of this hearing aid is $2300.00 per aid for a total of $4600.00 .  The patient's insurance will be billed for the cost of the hearing aids.     Mr. Abebe will return in 2 weeks for a hearing aid follow up.  At that time we will make adjustments to the hearing aid(s) and address problems as necessary.      It was a pleasure seeing Ben Abebe in Audiology today.  It is a pleasure helping Mr. Abebe with his amplification needs.         This document has been electronically signed by JALYN Cruz on July 10, 2018 3:32 PM            JALYN Cruz  Licensed Audiologist      For Billing and Coding:  Please bill the following to the patient's insurance:   Hearing Aid, Digital Monaural BTE Right- $2300.00   Hearing Aid, Digital Monaural BTE Left- $2300.00

## 2018-07-23 ENCOUNTER — CLINICAL SUPPORT (OUTPATIENT)
Dept: AUDIOLOGY | Facility: CLINIC | Age: 63
End: 2018-07-23

## 2018-07-23 DIAGNOSIS — Z46.1 ENCOUNTER FOR FITTING OR ADJUSTMENT OF HEARING AID: Primary | ICD-10-CM

## 2018-07-23 PROCEDURE — HEARINGNOCHG: Performed by: AUDIOLOGIST

## 2018-07-23 NOTE — PROGRESS NOTES
HEARING AID CHECK    Name:  Ben Abebe  :  1955  Age:  63 y.o.  Date of Evaluation:  2018      HISTORY    Reason for visit:  Ben Abebe is seen today for a two week hearing aid check.  Patient reports that he is doing well with the hearing aids.  He reports that he has been noticing subtle differences in noise.  He reports that the hearing aids get uncomfortable inside his ears after approximately 5 hours.  He reports that his ears occasionally itch.    Hearing aid history:  Patient is currently wearing a  in the Canal (MONISHA) hearing aid in both ear(s).     OFFICE VISIT    During today's visit the hearing aids were visually inspected and looked good.  Otoscopy was completed bilaterally revealing no redness or sores.  The patient reports good sound quality and does not wish to have any programming changes made at this time.  The patient reports that he has been playing with the hearing aid volume as needed.  The patient will return in two weeks for a hearing aid check at the end of his 30 day trial period.    It was a pleasure seeing Ben Abebe in Audiology today.  It is a pleasure helping Mr. Abebe with his amplification needs.          This document has been electronically signed by JALYN Cruz on 2018 9:02 AM        JALYN Cruz  Licensed Audiologist    For Billing and Codin  Hearing Aid Check, Binaural - no charge

## 2018-07-25 ENCOUNTER — TELEPHONE (OUTPATIENT)
Dept: FAMILY MEDICINE CLINIC | Facility: CLINIC | Age: 63
End: 2018-07-25

## 2018-07-25 ENCOUNTER — PRIOR AUTHORIZATION (OUTPATIENT)
Dept: FAMILY MEDICINE CLINIC | Facility: CLINIC | Age: 63
End: 2018-07-25

## 2018-07-25 RX ORDER — CELECOXIB 200 MG/1
200 CAPSULE ORAL AS NEEDED
Qty: 90 CAPSULE | Refills: 3 | Status: SHIPPED | OUTPATIENT
Start: 2018-07-25 | End: 2019-03-18 | Stop reason: SDUPTHER

## 2018-07-25 NOTE — TELEPHONE ENCOUNTER
Requested Refills Sent      ----- Message from Ben Abebe sent at 7/25/2018  7:58 AM CDT -----  Regarding: Prescription Question  Contact: [phone]  I am out of refills for Celecoxib 200mg. Would you please send a new prescription to [facility].    Thank you    Ben Abebe

## 2018-08-06 ENCOUNTER — CLINICAL SUPPORT (OUTPATIENT)
Dept: AUDIOLOGY | Facility: CLINIC | Age: 63
End: 2018-08-06

## 2018-08-06 DIAGNOSIS — Z46.1 ENCOUNTER FOR FITTING OR ADJUSTMENT OF HEARING AID: Primary | ICD-10-CM

## 2018-08-06 PROCEDURE — HEARINGNOCHG: Performed by: AUDIOLOGIST

## 2018-08-06 NOTE — PROGRESS NOTES
HEARING AID CHECK    Name:  Ben Abebe  :  1955  Age:  63 y.o.  Date of Evaluation:  2018      HISTORY    Reason for visit:  Ben Abebe is seen today for a hearing aid check at the end of his 30 day trial period.  Patient reports that he is doing fairly well with his hearing aids.  He reports still having difficulty hearing in background noise and whenever it is windy.  He reports hearing some occasional tinnitus.    Hearing aid history:  Patient is currently wearing a  in the Canal (MONISHA) hearing aid in both ear(s).     OFFICE VISIT    During today's visit the hearing aids were set to 90% of the prescription target based on patient's request.  The occlusion manager was decreased to medium.  Since the patient reported having difficulty hearing in background noise and in wind, the wind block and noise block was increased to strong in all hearing aid programs.  It is recommended that the patient return in 6 months for a hearing aid check.    It was a pleasure seeing Ben Abebe in Audiology today.  It is a pleasure helping Mr. Abebe with his amplification needs.          This document has been electronically signed by JALYN Cruz on 2018 3:54 PM        JALYN Cruz  Licensed Audiologist    For Billing and Codin  Hearing Aid Check, Binaural - no charge

## 2018-08-20 RX ORDER — FLUTICASONE PROPIONATE 50 MCG
2 SPRAY, SUSPENSION (ML) NASAL DAILY
Qty: 1 BOTTLE | Refills: 3 | Status: SHIPPED | OUTPATIENT
Start: 2018-08-20 | End: 2022-08-05

## 2018-09-06 ENCOUNTER — APPOINTMENT (OUTPATIENT)
Dept: LAB | Facility: HOSPITAL | Age: 63
End: 2018-09-06

## 2018-09-06 ENCOUNTER — OFFICE VISIT (OUTPATIENT)
Dept: FAMILY MEDICINE CLINIC | Facility: CLINIC | Age: 63
End: 2018-09-06

## 2018-09-06 VITALS
HEIGHT: 70 IN | SYSTOLIC BLOOD PRESSURE: 130 MMHG | BODY MASS INDEX: 27.73 KG/M2 | DIASTOLIC BLOOD PRESSURE: 60 MMHG | WEIGHT: 193.7 LBS | HEART RATE: 69 BPM | OXYGEN SATURATION: 98 %

## 2018-09-06 DIAGNOSIS — M51.36 DEGENERATIVE DISC DISEASE, LUMBAR: Primary | ICD-10-CM

## 2018-09-06 LAB
AMPHET+METHAMPHET UR QL: NEGATIVE
BARBITURATES UR QL SCN: NEGATIVE
BENZODIAZ UR QL SCN: NEGATIVE
CANNABINOIDS SERPL QL: NEGATIVE
COCAINE UR QL: NEGATIVE
METHADONE UR QL SCN: NEGATIVE
OPIATES UR QL: NEGATIVE
OXYCODONE UR QL SCN: NEGATIVE

## 2018-09-06 PROCEDURE — 80307 DRUG TEST PRSMV CHEM ANLYZR: CPT | Performed by: GENERAL PRACTICE

## 2018-09-06 PROCEDURE — 96372 THER/PROPH/DIAG INJ SC/IM: CPT | Performed by: GENERAL PRACTICE

## 2018-09-06 PROCEDURE — 99214 OFFICE O/P EST MOD 30 MIN: CPT | Performed by: GENERAL PRACTICE

## 2018-09-06 RX ORDER — TRIAMCINOLONE ACETONIDE 40 MG/ML
80 INJECTION, SUSPENSION INTRA-ARTICULAR; INTRAMUSCULAR ONCE
Status: DISCONTINUED | OUTPATIENT
Start: 2018-09-06 | End: 2018-09-06

## 2018-09-06 RX ORDER — TRIAMCINOLONE ACETONIDE 40 MG/ML
80 INJECTION, SUSPENSION INTRA-ARTICULAR; INTRAMUSCULAR ONCE
Status: COMPLETED | OUTPATIENT
Start: 2018-09-06 | End: 2018-09-06

## 2018-09-06 RX ORDER — OXYCODONE HYDROCHLORIDE AND ACETAMINOPHEN 5; 325 MG/1; MG/1
1 TABLET ORAL EVERY 6 HOURS PRN
Qty: 28 TABLET | Refills: 0 | Status: SHIPPED | OUTPATIENT
Start: 2018-09-06 | End: 2019-01-18

## 2018-09-06 RX ORDER — CYCLOBENZAPRINE HCL 10 MG
10 TABLET ORAL 3 TIMES DAILY PRN
Qty: 30 TABLET | Refills: 1 | Status: SHIPPED | OUTPATIENT
Start: 2018-09-06 | End: 2019-01-18

## 2018-09-06 RX ADMIN — TRIAMCINOLONE ACETONIDE 80 MG: 40 INJECTION, SUSPENSION INTRA-ARTICULAR; INTRAMUSCULAR at 15:29

## 2018-09-06 NOTE — PROGRESS NOTES
Subjective   Ben Abebe is a 63 y.o. male.   Chief Complaint   Patient presents with   • Back Pain     possible strain      2 months ago pulled up the garage door and aggravated back, got a little better but 2 days ago started hurting again, no specific injury. Got quite severe, took celebrex and ES tylenol. Has a history of degenerative disc disease and has had previous surgery.   Back Pain   This is a chronic problem. The current episode started more than 1 year ago. The problem occurs intermittently. The problem has been gradually worsening since onset. The pain is present in the lumbar spine. The pain is at a severity of 4/10. The pain is moderate. The pain is worse during the day. The symptoms are aggravated by bending, twisting, standing and position. Stiffness is present in the morning. Pertinent negatives include no abdominal pain, bladder incontinence, bowel incontinence, chest pain, dysuria, fever, headaches, leg pain, numbness or weakness. He has tried NSAIDs, muscle relaxant and analgesics for the symptoms. The treatment provided moderate relief.      The following portions of the patient's history were reviewed and updated as appropriate: allergies, current medications, past social history and problem list.    Outpatient Medications Prior to Visit   Medication Sig Dispense Refill   • Acetaminophen (TYLENOL EXTRA STRENGTH PO) Take 1 tablet by mouth As Needed.     • amLODIPine (NORVASC) 5 MG tablet Take 1 tablet by mouth Every Night. 90 tablet 3   • aspirin 81 MG EC tablet Take 1 tablet by mouth Daily.     • BYSTOLIC 10 MG tablet TAKE 1 TABLET BY MOUTH DAILY 90 tablet 2   • celecoxib (CeleBREX) 200 MG capsule Take 1 capsule by mouth As Needed for Mild Pain . 90 capsule 3   • dicyclomine (BENTYL) 20 MG tablet Take 1 tablet by mouth 4 (Four) Times a Day As Needed (bowel problem). TAKE 1 q 6-8 HOURS AS NEEDED FOR BOWEL PROBLEM 90 tablet 1   • EPINEPHrine (EPIPEN) 0.3 MG/0.3ML solution auto-injector  injection Use as directed     • fenofibrate (TRICOR) 145 MG tablet TAKE 1 TABLET BY MOUTH DAILY 90 tablet 2   • fluticasone (FLONASE) 50 MCG/ACT nasal spray 2 sprays into the nostril(s) as directed by provider Daily. 1 bottle 3   • folic acid (FOLVITE) 1 MG tablet Take 1 mg by mouth Daily.     • magnesium gluconate (MAGONATE) 500 MG tablet Take 500 mg by mouth Daily.     • Multiple Vitamins-Minerals (CENTRUM SILVER ULTRA MENS PO) Take 1 tablet by mouth Daily.     • raNITIdine (ZANTAC) 150 MG tablet Take 150 mg by mouth Daily As Needed.     • SUMAtriptan (IMITREX) 50 MG tablet Take 1 tablet by mouth Every 2 (Two) Hours As Needed for Migraine. 9 tablet 2   • tiZANidine (ZANAFLEX) 4 MG tablet 1-2 tabs qhs 180 tablet 3   • traZODone (DESYREL) 50 MG tablet 1 - 2 qhs for sleep 180 tablet 3   • valsartan-hydrochlorothiazide (DIOVAN-HCT) 320-25 MG per tablet TAKE 1 TABLET BY MOUTH DAILY 90 tablet 2   • vitamin B-12 (CYANOCOBALAMIN) 2500 MCG sublingual tablet tablet Place 5,000 mcg under the tongue Daily.       No facility-administered medications prior to visit.        Review of Systems   Constitutional: Negative.  Negative for chills, fatigue, fever and unexpected weight change.   HENT: Negative.  Negative for congestion, ear pain, hearing loss, nosebleeds, rhinorrhea, sneezing, sore throat and tinnitus.    Eyes: Negative.  Negative for discharge.   Respiratory: Negative.  Negative for cough, shortness of breath and wheezing.    Cardiovascular: Negative.  Negative for chest pain and palpitations.   Gastrointestinal: Negative.  Negative for abdominal pain, bowel incontinence, constipation, diarrhea, nausea and vomiting.   Endocrine: Negative.    Genitourinary: Negative.  Negative for bladder incontinence, dysuria, frequency and urgency.   Musculoskeletal: Positive for back pain. Negative for arthralgias, joint swelling, myalgias and neck pain.   Skin: Negative.  Negative for rash.   Allergic/Immunologic: Negative.   "  Neurological: Negative.  Negative for dizziness, weakness, numbness and headaches.   Hematological: Negative.  Negative for adenopathy.   Psychiatric/Behavioral: Negative.  Negative for dysphoric mood and sleep disturbance. The patient is not nervous/anxious.        Objective   Visit Vitals  /60   Pulse 69   Ht 177.8 cm (70\")   Wt 87.9 kg (193 lb 11.2 oz)   SpO2 98%   BMI 27.79 kg/m²     Physical Exam   Constitutional: He is oriented to person, place, and time. He appears well-developed and well-nourished. No distress.   HENT:   Head: Normocephalic.   Nose: Nose normal.   Mouth/Throat: Oropharynx is clear and moist.   Eyes: Pupils are equal, round, and reactive to light. Conjunctivae and EOM are normal. Right eye exhibits no discharge. Left eye exhibits no discharge.   Neck: No thyromegaly present.   Cardiovascular: Normal rate, regular rhythm, normal heart sounds and intact distal pulses.    No murmur heard.  Pulmonary/Chest: Effort normal and breath sounds normal.   Musculoskeletal: He exhibits no edema.        Lumbar back: He exhibits decreased range of motion, tenderness and spasm.   Scoliosis caused by muscle spasm    Straight leg raise 80 bilat     Lymphadenopathy:     He has no cervical adenopathy.   Neurological: He is alert and oriented to person, place, and time.   Reflex Scores:       Patellar reflexes are 0 on the right side and 0 on the left side.  Skin: Skin is warm and dry.   Psychiatric: He has a normal mood and affect.   Nursing note and vitals reviewed.      Assessment/Plan   Problem List Items Addressed This Visit     None      Visit Diagnoses     Degenerative disc disease, lumbar    -  Primary    Relevant Medications    triamcinolone acetonide (KENALOG-40) injection 80 mg (Completed)    Other Relevant Orders    Urine Drug Screen - Urine, Clean Catch          Recheck if not improving. Zachariah reviewed and appropriate.     New Medications Ordered This Visit   Medications   • " oxyCODONE-acetaminophen (PERCOCET) 5-325 MG per tablet     Sig: Take 1 tablet by mouth Every 6 (Six) Hours As Needed for Severe Pain .     Dispense:  28 tablet     Refill:  0   • cyclobenzaprine (FLEXERIL) 10 MG tablet     Sig: Take 1 tablet by mouth 3 (Three) Times a Day As Needed for Muscle Spasms.     Dispense:  30 tablet     Refill:  1   • triamcinolone acetonide (KENALOG-40) injection 80 mg     Return for Next scheduled follow up.

## 2018-09-10 ENCOUNTER — TELEPHONE (OUTPATIENT)
Dept: FAMILY MEDICINE CLINIC | Facility: CLINIC | Age: 63
End: 2018-09-10

## 2018-09-10 RX ORDER — TRAZODONE HYDROCHLORIDE 50 MG/1
TABLET ORAL
Qty: 180 TABLET | Refills: 0 | Status: SHIPPED | OUTPATIENT
Start: 2018-09-10 | End: 2018-12-06 | Stop reason: SDUPTHER

## 2018-09-10 NOTE — TELEPHONE ENCOUNTER
The brand that he is on has not been recalled at this time.  [facility] still has the brand he is on, on their shelves.  She states he just got it refilled 8/19/18

## 2018-09-10 NOTE — TELEPHONE ENCOUNTER
Mr. Abebe is currently on Valsartan/HCTZ 320/25 mg Takes 1 Tablet daily,    Please Advise on Med Change.     Thank You        ----- Message from Ben Abebe sent at 9/10/2018 10:32 AM CDT -----  Regarding: Non-Urgent Medical Question  Contact: [phone]  Last week when I was in to see you your nurse mentioned there has been a recall on Valsartan. Is that anything I need to be concerned about.    Also, the dates that I got the Shingrix shots are June 26, 2018 and August 28, 2018.

## 2018-10-03 ENCOUNTER — LAB (OUTPATIENT)
Dept: LAB | Facility: HOSPITAL | Age: 63
End: 2018-10-03

## 2018-10-03 DIAGNOSIS — Z12.5 ENCOUNTER FOR PROSTATE CANCER SCREENING: ICD-10-CM

## 2018-10-03 DIAGNOSIS — I10 ESSENTIAL HYPERTENSION: Chronic | ICD-10-CM

## 2018-10-03 LAB
ALBUMIN SERPL-MCNC: 4.7 G/DL (ref 3.4–4.8)
ALBUMIN/GLOB SERPL: 1.5 G/DL (ref 1.1–1.8)
ALP SERPL-CCNC: 38 U/L (ref 38–126)
ALT SERPL W P-5'-P-CCNC: 53 U/L (ref 21–72)
ANION GAP SERPL CALCULATED.3IONS-SCNC: 12 MMOL/L (ref 5–15)
ARTICHOKE IGE QN: 156 MG/DL (ref 1–129)
AST SERPL-CCNC: 54 U/L (ref 17–59)
BILIRUB SERPL-MCNC: 1.1 MG/DL (ref 0.2–1.3)
BILIRUB UR QL STRIP: NEGATIVE
BUN BLD-MCNC: 24 MG/DL (ref 7–21)
BUN/CREAT SERPL: 19.4 (ref 7–25)
CALCIUM SPEC-SCNC: 9.6 MG/DL (ref 8.4–10.2)
CHLORIDE SERPL-SCNC: 97 MMOL/L (ref 95–110)
CHOLEST SERPL-MCNC: 222 MG/DL (ref 0–199)
CLARITY UR: CLEAR
CO2 SERPL-SCNC: 27 MMOL/L (ref 22–31)
COLOR UR: YELLOW
CREAT BLD-MCNC: 1.24 MG/DL (ref 0.7–1.3)
GFR SERPL CREATININE-BSD FRML MDRD: 59 ML/MIN/1.73 (ref 49–113)
GLOBULIN UR ELPH-MCNC: 3.1 GM/DL (ref 2.3–3.5)
GLUCOSE BLD-MCNC: 102 MG/DL (ref 60–100)
GLUCOSE UR STRIP-MCNC: NEGATIVE MG/DL
HDLC SERPL-MCNC: 38 MG/DL (ref 60–200)
HGB UR QL STRIP.AUTO: NEGATIVE
KETONES UR QL STRIP: NEGATIVE
LDLC/HDLC SERPL: 3.62 {RATIO} (ref 0–3.55)
LEUKOCYTE ESTERASE UR QL STRIP.AUTO: NEGATIVE
NITRITE UR QL STRIP: NEGATIVE
PH UR STRIP.AUTO: 7 [PH] (ref 5–9)
POTASSIUM BLD-SCNC: 4.3 MMOL/L (ref 3.5–5.1)
PROT SERPL-MCNC: 7.8 G/DL (ref 6.3–8.6)
PROT UR QL STRIP: NEGATIVE
PSA SERPL-MCNC: 0.78 NG/ML (ref 0–4)
SODIUM BLD-SCNC: 136 MMOL/L (ref 137–145)
SP GR UR STRIP: 1.01 (ref 1–1.03)
TRIGL SERPL-MCNC: 233 MG/DL (ref 20–199)
UROBILINOGEN UR QL STRIP: NORMAL

## 2018-10-03 PROCEDURE — 81003 URINALYSIS AUTO W/O SCOPE: CPT

## 2018-10-03 PROCEDURE — G0103 PSA SCREENING: HCPCS

## 2018-10-03 PROCEDURE — 80061 LIPID PANEL: CPT

## 2018-10-03 PROCEDURE — 36415 COLL VENOUS BLD VENIPUNCTURE: CPT

## 2018-10-03 PROCEDURE — 80053 COMPREHEN METABOLIC PANEL: CPT

## 2018-10-09 ENCOUNTER — OFFICE VISIT (OUTPATIENT)
Dept: FAMILY MEDICINE CLINIC | Facility: CLINIC | Age: 63
End: 2018-10-09

## 2018-10-09 VITALS
SYSTOLIC BLOOD PRESSURE: 154 MMHG | BODY MASS INDEX: 27 KG/M2 | HEART RATE: 64 BPM | HEIGHT: 70 IN | DIASTOLIC BLOOD PRESSURE: 82 MMHG | WEIGHT: 188.6 LBS | OXYGEN SATURATION: 98 %

## 2018-10-09 DIAGNOSIS — R53.83 OTHER FATIGUE: ICD-10-CM

## 2018-10-09 DIAGNOSIS — G62.9 PERIPHERAL POLYNEUROPATHY: Chronic | ICD-10-CM

## 2018-10-09 DIAGNOSIS — I10 ESSENTIAL HYPERTENSION: Chronic | ICD-10-CM

## 2018-10-09 DIAGNOSIS — Z23 NEED FOR IMMUNIZATION AGAINST INFLUENZA: ICD-10-CM

## 2018-10-09 DIAGNOSIS — Z00.00 ANNUAL PHYSICAL EXAM: Primary | ICD-10-CM

## 2018-10-09 PROCEDURE — 90471 IMMUNIZATION ADMIN: CPT | Performed by: GENERAL PRACTICE

## 2018-10-09 PROCEDURE — 99396 PREV VISIT EST AGE 40-64: CPT | Performed by: GENERAL PRACTICE

## 2018-10-09 PROCEDURE — 90674 CCIIV4 VAC NO PRSV 0.5 ML IM: CPT | Performed by: GENERAL PRACTICE

## 2018-10-09 RX ORDER — METOPROLOL SUCCINATE 50 MG/1
50 TABLET, EXTENDED RELEASE ORAL DAILY
Qty: 90 TABLET | Refills: 3 | Status: SHIPPED | OUTPATIENT
Start: 2018-10-09 | End: 2019-07-05 | Stop reason: SDUPTHER

## 2018-10-09 NOTE — PROGRESS NOTES
Subjective   Ben Abebe is a 63 y.o. male.     Chief Complaint   Patient presents with   • Annual Exam     labs   • Hypertension       History of Present Illness     For annual wellness exam.  Labs reviewed. Woke up with bilateral leg pain on Sunday, had washed his truck the day before, lasted for a few days.  Continues to complain of a lot of fatigue.  Unsure if this is related to his neuromuscular issue for which she sees a neurologist in [place].  Is wanting to change his by systolic as this will be too expensive for him once he goes to Medicare.  Blood pressure has been running just a little bit high at home.    The following portions of the patient's history were reviewed and updated as appropriate: allergies, current medications, past family and social history and problem list.    Outpatient Medications Prior to Visit   Medication Sig Dispense Refill   • Acetaminophen (TYLENOL EXTRA STRENGTH PO) Take 1 tablet by mouth As Needed.     • amLODIPine (NORVASC) 5 MG tablet Take 1 tablet by mouth Every Night. 90 tablet 3   • aspirin 81 MG EC tablet Take 1 tablet by mouth Daily.     • BYSTOLIC 10 MG tablet TAKE 1 TABLET BY MOUTH DAILY 90 tablet 2   • celecoxib (CeleBREX) 200 MG capsule Take 1 capsule by mouth As Needed for Mild Pain . 90 capsule 3   • cyclobenzaprine (FLEXERIL) 10 MG tablet Take 1 tablet by mouth 3 (Three) Times a Day As Needed for Muscle Spasms. 30 tablet 1   • dicyclomine (BENTYL) 20 MG tablet Take 1 tablet by mouth 4 (Four) Times a Day As Needed (bowel problem). TAKE 1 q 6-8 HOURS AS NEEDED FOR BOWEL PROBLEM 90 tablet 1   • EPINEPHrine (EPIPEN) 0.3 MG/0.3ML solution auto-injector injection Use as directed     • fenofibrate (TRICOR) 145 MG tablet TAKE 1 TABLET BY MOUTH DAILY 90 tablet 2   • fluticasone (FLONASE) 50 MCG/ACT nasal spray 2 sprays into the nostril(s) as directed by provider Daily. 1 bottle 3   • folic acid (FOLVITE) 1 MG tablet Take 1 mg by mouth Daily.     • magnesium  gluconate (MAGONATE) 500 MG tablet Take 500 mg by mouth Daily.     • Multiple Vitamins-Minerals (CENTRUM SILVER ULTRA MENS PO) Take 1 tablet by mouth Daily.     • oxyCODONE-acetaminophen (PERCOCET) 5-325 MG per tablet Take 1 tablet by mouth Every 6 (Six) Hours As Needed for Severe Pain . 28 tablet 0   • raNITIdine (ZANTAC) 150 MG tablet Take 150 mg by mouth Daily As Needed.     • SUMAtriptan (IMITREX) 50 MG tablet Take 1 tablet by mouth Every 2 (Two) Hours As Needed for Migraine. 9 tablet 2   • tiZANidine (ZANAFLEX) 4 MG tablet 1-2 tabs qhs 180 tablet 3   • traZODone (DESYREL) 50 MG tablet TAKE 1 TO 2 TABLETS BY MOUTH EVERY NIGHT AT BEDTIME FOR SLEEP 180 tablet 0   • valsartan-hydrochlorothiazide (DIOVAN-HCT) 320-25 MG per tablet TAKE 1 TABLET BY MOUTH DAILY 90 tablet 2   • vitamin B-12 (CYANOCOBALAMIN) 2500 MCG sublingual tablet tablet Place 5,000 mcg under the tongue Daily.       No facility-administered medications prior to visit.        Review of Systems   Constitutional: Positive for fatigue. Negative for chills, fever and unexpected weight change.   HENT: Negative.  Negative for congestion, ear pain, hearing loss, nosebleeds, rhinorrhea, sneezing, sore throat and tinnitus.    Eyes: Negative.  Negative for discharge.   Respiratory: Negative.  Negative for cough, shortness of breath and wheezing.    Cardiovascular: Negative.  Negative for chest pain and palpitations.   Gastrointestinal: Negative.  Negative for abdominal pain, constipation, diarrhea, nausea and vomiting.   Endocrine: Negative.    Genitourinary: Negative.  Negative for dysuria, frequency and urgency.   Musculoskeletal: Positive for myalgias. Negative for arthralgias, back pain, joint swelling and neck pain.   Skin: Negative.  Negative for rash.   Allergic/Immunologic: Negative.    Neurological: Negative.  Negative for dizziness, weakness, numbness and headaches.   Hematological: Negative.  Negative for adenopathy.   Psychiatric/Behavioral:  "Negative.  Negative for dysphoric mood and sleep disturbance. The patient is not nervous/anxious.        Objective     Visit Vitals  /82   Pulse 64   Ht 177.8 cm (70\")   Wt 85.5 kg (188 lb 9.6 oz)   SpO2 98%   BMI 27.06 kg/m²     Physical Exam   Constitutional: He is oriented to person, place, and time. He appears well-developed and well-nourished. No distress.   HENT:   Head: Normocephalic and atraumatic.   Nose: Nose normal.   Mouth/Throat: Oropharynx is clear and moist.   Eyes: Pupils are equal, round, and reactive to light. Conjunctivae and EOM are normal. Right eye exhibits no discharge. Left eye exhibits no discharge.   Neck: Normal range of motion. No thyromegaly present.   Cardiovascular: Normal rate, regular rhythm, normal heart sounds and intact distal pulses.    No murmur heard.  Pulmonary/Chest: Effort normal and breath sounds normal. No respiratory distress. He has no wheezes. He has no rales. He exhibits no tenderness.   Abdominal: Soft. Bowel sounds are normal. He exhibits no distension and no mass. There is no tenderness. No hernia.   Musculoskeletal: Normal range of motion. He exhibits no deformity.   Lymphadenopathy:     He has no cervical adenopathy.   Neurological: He is alert and oriented to person, place, and time. He has normal reflexes.   Skin: Skin is warm and dry. No rash noted. No pallor.   Psychiatric: He has a normal mood and affect. His behavior is normal. Judgment and thought content normal.     Results for orders placed or performed in visit on 10/03/18   Comprehensive Metabolic Panel   Result Value Ref Range    Glucose 102 (H) 60 - 100 mg/dL    BUN 24 (H) 7 - 21 mg/dL    Creatinine 1.24 0.70 - 1.30 mg/dL    Sodium 136 (L) 137 - 145 mmol/L    Potassium 4.3 3.5 - 5.1 mmol/L    Chloride 97 95 - 110 mmol/L    CO2 27.0 22.0 - 31.0 mmol/L    Calcium 9.6 8.4 - 10.2 mg/dL    Total Protein 7.8 6.3 - 8.6 g/dL    Albumin 4.70 3.40 - 4.80 g/dL    ALT (SGPT) 53 21 - 72 U/L    AST (SGOT) 54 " 17 - 59 U/L    Alkaline Phosphatase 38 38 - 126 U/L    Total Bilirubin 1.1 0.2 - 1.3 mg/dL    eGFR Non  Amer 59 49 - 113 mL/min/1.73    Globulin 3.1 2.3 - 3.5 gm/dL    A/G Ratio 1.5 1.1 - 1.8 g/dL    BUN/Creatinine Ratio 19.4 7.0 - 25.0    Anion Gap 12.0 5.0 - 15.0 mmol/L   Lipid Panel   Result Value Ref Range    Total Cholesterol 222 (H) 0 - 199 mg/dL    Triglycerides 233 (H) 20 - 199 mg/dL    HDL Cholesterol 38 (L) 60 - 200 mg/dL    LDL Cholesterol  156 (H) 1 - 129 mg/dL    LDL/HDL Ratio 3.62 (H) 0.00 - 3.55   Urinalysis With / Culture If Indicated - Urine, Clean Catch   Result Value Ref Range    Color, UA Yellow Yellow, Straw, Dark Yellow, [place]    Appearance, UA Clear Clear    pH, UA 7.0 5.0 - 9.0    Specific Gravity, UA 1.006 1.003 - 1.030    Glucose, UA Negative Negative    Ketones, UA Negative Negative    Bilirubin, UA Negative Negative    Blood, UA Negative Negative    Protein, UA Negative Negative    Leuk Esterase, UA Negative Negative    Nitrite, UA Negative Negative    Urobilinogen, UA 0.2 E.U./dL 0.2 - 1.0 E.U./dL   PSA Screen   Result Value Ref Range    PSA 0.780 0.000 - 4.000 ng/mL      Assessment/Plan   Problem List Items Addressed This Visit        Cardiovascular and Mediastinum    Essential hypertension (Chronic)    Relevant Medications    metoprolol succinate XL (TOPROL XL) 50 MG 24 hr tablet       Nervous and Auditory    Peripheral neuropathy (Chronic)      Other Visit Diagnoses     Annual physical exam    -  Primary    Need for immunization against influenza        Relevant Medications    Influenza Vac Subunit Quad (FLUCELVAX) injection 0.5 mL (Completed)    Other fatigue             Switch by systolic to metoprolol, advice given as to how to do this.  Recheck if myalgias recur, suspect this was a combination of over work and dehydration.  New Medications Ordered This Visit   Medications   • metoprolol succinate XL (TOPROL XL) 50 MG 24 hr tablet     Sig: Take 1 tablet by mouth Daily.      Dispense:  90 tablet     Refill:  3   • Influenza Vac Subunit Quad (FLUCELVAX) injection 0.5 mL     Return in about 3 months (around 1/9/2019) for Recheck.

## 2018-11-19 RX ORDER — VALSARTAN AND HYDROCHLOROTHIAZIDE 320; 25 MG/1; MG/1
TABLET, FILM COATED ORAL
Qty: 90 TABLET | Refills: 0 | Status: SHIPPED | OUTPATIENT
Start: 2018-11-19 | End: 2019-02-10 | Stop reason: SDUPTHER

## 2018-11-19 RX ORDER — FENOFIBRATE 145 MG/1
TABLET, COATED ORAL
Qty: 90 TABLET | Refills: 0 | Status: SHIPPED | OUTPATIENT
Start: 2018-11-19 | End: 2019-02-10 | Stop reason: SDUPTHER

## 2018-12-06 RX ORDER — TRAZODONE HYDROCHLORIDE 50 MG/1
TABLET ORAL
Qty: 180 TABLET | Refills: 0 | Status: SHIPPED | OUTPATIENT
Start: 2018-12-06 | End: 2019-02-28 | Stop reason: SDUPTHER

## 2019-01-08 ENCOUNTER — OFFICE VISIT (OUTPATIENT)
Dept: FAMILY MEDICINE CLINIC | Facility: CLINIC | Age: 64
End: 2019-01-08

## 2019-01-08 VITALS
HEART RATE: 60 BPM | BODY MASS INDEX: 28.07 KG/M2 | HEIGHT: 70 IN | OXYGEN SATURATION: 98 % | WEIGHT: 196.1 LBS | SYSTOLIC BLOOD PRESSURE: 140 MMHG | DIASTOLIC BLOOD PRESSURE: 78 MMHG

## 2019-01-08 DIAGNOSIS — I10 ESSENTIAL HYPERTENSION: Primary | Chronic | ICD-10-CM

## 2019-01-08 PROCEDURE — 99213 OFFICE O/P EST LOW 20 MIN: CPT | Performed by: GENERAL PRACTICE

## 2019-01-08 RX ORDER — AMLODIPINE BESYLATE 10 MG/1
10 TABLET ORAL DAILY
Qty: 90 TABLET | Refills: 3 | Status: SHIPPED | OUTPATIENT
Start: 2019-01-08 | End: 2019-04-04 | Stop reason: SDUPTHER

## 2019-01-08 NOTE — PROGRESS NOTES
Subjective   Ben Abebe is a 63 y.o. male.   Chief Complaint   Patient presents with   • Follow-up   • Hypertension     For review and evaluation of management of chronic medical problems. Blood pressure is running high. Has been having more headaches, wondering if weather related.   Hypertension   This is a chronic problem. The current episode started more than 1 year ago. The problem is unchanged. The problem is controlled. Pertinent negatives include no chest pain, headaches, neck pain, palpitations or shortness of breath. There are no associated agents to hypertension. Current antihypertension treatment includes beta blockers, angiotensin blockers, diuretics and calcium channel blockers. The current treatment provides moderate improvement. There are no compliance problems.       The following portions of the patient's history were reviewed and updated as appropriate: allergies, current medications, past social history and problem list.    Outpatient Medications Prior to Visit   Medication Sig Dispense Refill   • Acetaminophen (TYLENOL EXTRA STRENGTH PO) Take 1 tablet by mouth As Needed.     • aspirin 81 MG EC tablet Take 1 tablet by mouth Daily.     • celecoxib (CeleBREX) 200 MG capsule Take 1 capsule by mouth As Needed for Mild Pain . 90 capsule 3   • cyclobenzaprine (FLEXERIL) 10 MG tablet Take 1 tablet by mouth 3 (Three) Times a Day As Needed for Muscle Spasms. 30 tablet 1   • dicyclomine (BENTYL) 20 MG tablet Take 1 tablet by mouth 4 (Four) Times a Day As Needed (bowel problem). TAKE 1 q 6-8 HOURS AS NEEDED FOR BOWEL PROBLEM 90 tablet 1   • EPINEPHrine (EPIPEN) 0.3 MG/0.3ML solution auto-injector injection Use as directed     • fenofibrate (TRICOR) 145 MG tablet TAKE 1 TABLET BY MOUTH DAILY 90 tablet 0   • fluticasone (FLONASE) 50 MCG/ACT nasal spray 2 sprays into the nostril(s) as directed by provider Daily. 1 bottle 3   • folic acid (FOLVITE) 1 MG tablet Take 1 mg by mouth Daily.     • magnesium  gluconate (MAGONATE) 500 MG tablet Take 500 mg by mouth Daily.     • metoprolol succinate XL (TOPROL XL) 50 MG 24 hr tablet Take 1 tablet by mouth Daily. 90 tablet 3   • Multiple Vitamins-Minerals (CENTRUM SILVER ULTRA MENS PO) Take 1 tablet by mouth Daily.     • oxyCODONE-acetaminophen (PERCOCET) 5-325 MG per tablet Take 1 tablet by mouth Every 6 (Six) Hours As Needed for Severe Pain . 28 tablet 0   • raNITIdine (ZANTAC) 150 MG tablet Take 150 mg by mouth Daily As Needed.     • SUMAtriptan (IMITREX) 50 MG tablet Take 1 tablet by mouth Every 2 (Two) Hours As Needed for Migraine. 9 tablet 2   • tiZANidine (ZANAFLEX) 4 MG tablet 1-2 tabs qhs 180 tablet 3   • traZODone (DESYREL) 50 MG tablet TAKE 1 TO 2 TABLETS BY MOUTH EVERY NIGHT AT BEDTIME FOR SLEEP 180 tablet 0   • valsartan-hydrochlorothiazide (DIOVAN-HCT) 320-25 MG per tablet TAKE 1 TABLET BY MOUTH DAILY 90 tablet 0   • vitamin B-12 (CYANOCOBALAMIN) 2500 MCG sublingual tablet tablet Place 5,000 mcg under the tongue Daily.     • amLODIPine (NORVASC) 5 MG tablet Take 1 tablet by mouth Every Night. 90 tablet 3   • BYSTOLIC 10 MG tablet TAKE 1 TABLET BY MOUTH DAILY 90 tablet 2     No facility-administered medications prior to visit.        Review of Systems   Constitutional: Negative.  Negative for chills, fatigue, fever and unexpected weight change.   HENT: Negative.  Negative for congestion, ear pain, hearing loss, nosebleeds, rhinorrhea, sneezing, sore throat and tinnitus.    Eyes: Negative.  Negative for discharge.   Respiratory: Negative.  Negative for cough, shortness of breath and wheezing.    Cardiovascular: Negative.  Negative for chest pain and palpitations.   Gastrointestinal: Negative.  Negative for abdominal pain, constipation, diarrhea, nausea and vomiting.   Endocrine: Negative.    Genitourinary: Negative.  Negative for dysuria, frequency and urgency.   Musculoskeletal: Negative.  Negative for arthralgias, back pain, joint swelling, myalgias and neck  "pain.   Skin: Negative.  Negative for rash.   Allergic/Immunologic: Negative.    Neurological: Negative.  Negative for dizziness, weakness, numbness and headaches.   Hematological: Negative.  Negative for adenopathy.   Psychiatric/Behavioral: Negative.  Negative for dysphoric mood and sleep disturbance. The patient is not nervous/anxious.        Objective   Visit Vitals  /78 (BP Location: Left arm, Patient Position: Sitting, Cuff Size: Adult)   Pulse 60   Ht 177.8 cm (70\")   Wt 89 kg (196 lb 1.6 oz)   SpO2 98%   BMI 28.14 kg/m²     Physical Exam   Constitutional: He is oriented to person, place, and time. He appears well-developed and well-nourished. No distress.   HENT:   Head: Normocephalic.   Nose: Nose normal.   Mouth/Throat: Oropharynx is clear and moist.   Eyes: Conjunctivae and EOM are normal. Pupils are equal, round, and reactive to light. Right eye exhibits no discharge. Left eye exhibits no discharge.   Neck: No thyromegaly present.   Cardiovascular: Normal rate, regular rhythm, normal heart sounds and intact distal pulses.   No murmur heard.  Pulmonary/Chest: Effort normal and breath sounds normal.   Musculoskeletal: He exhibits no edema.   Lymphadenopathy:     He has no cervical adenopathy.   Neurological: He is alert and oriented to person, place, and time.   Skin: Skin is warm and dry.   Psychiatric: He has a normal mood and affect.   Nursing note and vitals reviewed.      Assessment/Plan   Problem List Items Addressed This Visit        Cardiovascular and Mediastinum    Essential hypertension - Primary (Chronic)    Relevant Medications    amLODIPine (NORVASC) 10 MG tablet    Other Relevant Orders    Basic Metabolic Panel    LDL Cholesterol, Direct         Increase amlodipine    New Medications Ordered This Visit   Medications   • amLODIPine (NORVASC) 10 MG tablet     Sig: Take 1 tablet by mouth Daily.     Dispense:  90 tablet     Refill:  3     Return in about 3 months (around 4/8/2019) for " Recheck, medicare wellness visit.

## 2019-02-11 RX ORDER — FENOFIBRATE 145 MG/1
TABLET, COATED ORAL
Qty: 90 TABLET | Refills: 1 | Status: SHIPPED | OUTPATIENT
Start: 2019-02-11 | End: 2019-02-11 | Stop reason: SDUPTHER

## 2019-02-11 RX ORDER — VALSARTAN AND HYDROCHLOROTHIAZIDE 320; 25 MG/1; MG/1
1 TABLET, FILM COATED ORAL DAILY
Qty: 90 TABLET | Refills: 1 | Status: SHIPPED | OUTPATIENT
Start: 2019-02-11 | End: 2019-06-10 | Stop reason: SDUPTHER

## 2019-02-11 RX ORDER — FENOFIBRATE 145 MG/1
145 TABLET, COATED ORAL DAILY
Qty: 90 TABLET | Refills: 1 | Status: SHIPPED | OUTPATIENT
Start: 2019-02-11 | End: 2019-06-03 | Stop reason: SDUPTHER

## 2019-02-11 RX ORDER — EPINEPHRINE 0.3 MG/.3ML
0.3 INJECTION SUBCUTANEOUS ONCE
Qty: 1 EACH | Refills: 5 | Status: SHIPPED | OUTPATIENT
Start: 2019-02-11 | End: 2019-02-11

## 2019-02-11 RX ORDER — VALSARTAN AND HYDROCHLOROTHIAZIDE 320; 25 MG/1; MG/1
TABLET, FILM COATED ORAL
Qty: 90 TABLET | Refills: 1 | Status: SHIPPED | OUTPATIENT
Start: 2019-02-11 | End: 2019-02-11 | Stop reason: SDUPTHER

## 2019-02-28 RX ORDER — TRAZODONE HYDROCHLORIDE 50 MG/1
TABLET ORAL
Qty: 180 TABLET | Refills: 1 | Status: SHIPPED | OUTPATIENT
Start: 2019-02-28 | End: 2019-04-12

## 2019-03-18 RX ORDER — EPINEPHRINE 0.3 MG/.3ML
INJECTION SUBCUTANEOUS
Qty: 1 EACH | Refills: 2 | Status: SHIPPED | OUTPATIENT
Start: 2019-03-18 | End: 2021-08-30 | Stop reason: SDUPTHER

## 2019-03-18 RX ORDER — CELECOXIB 200 MG/1
200 CAPSULE ORAL AS NEEDED
Qty: 90 CAPSULE | Refills: 3 | Status: SHIPPED | OUTPATIENT
Start: 2019-03-18 | End: 2020-06-02 | Stop reason: SDUPTHER

## 2019-04-03 ENCOUNTER — TELEPHONE (OUTPATIENT)
Dept: FAMILY MEDICINE CLINIC | Facility: CLINIC | Age: 64
End: 2019-04-03

## 2019-04-04 RX ORDER — AMLODIPINE BESYLATE 10 MG/1
10 TABLET ORAL DAILY
Qty: 90 TABLET | Refills: 3 | Status: SHIPPED | OUTPATIENT
Start: 2019-04-04 | End: 2020-05-04

## 2019-04-05 ENCOUNTER — LAB (OUTPATIENT)
Dept: LAB | Facility: HOSPITAL | Age: 64
End: 2019-04-05

## 2019-04-05 DIAGNOSIS — I10 ESSENTIAL HYPERTENSION: Chronic | ICD-10-CM

## 2019-04-05 LAB
ANION GAP SERPL CALCULATED.3IONS-SCNC: 12.8 MMOL/L
ARTICHOKE IGE QN: 146 MG/DL (ref 0–100)
BUN BLD-MCNC: 21 MG/DL (ref 8–23)
BUN/CREAT SERPL: 16.8 (ref 7–25)
CALCIUM SPEC-SCNC: 9.2 MG/DL (ref 8.6–10.5)
CHLORIDE SERPL-SCNC: 103 MMOL/L (ref 98–107)
CO2 SERPL-SCNC: 23.2 MMOL/L (ref 22–29)
CREAT BLD-MCNC: 1.25 MG/DL (ref 0.76–1.27)
GFR SERPL CREATININE-BSD FRML MDRD: 58 ML/MIN/1.73
GLUCOSE BLD-MCNC: 106 MG/DL (ref 65–99)
POTASSIUM BLD-SCNC: 4.1 MMOL/L (ref 3.5–5.2)
SODIUM BLD-SCNC: 139 MMOL/L (ref 136–145)

## 2019-04-05 PROCEDURE — 80048 BASIC METABOLIC PNL TOTAL CA: CPT

## 2019-04-05 PROCEDURE — 36415 COLL VENOUS BLD VENIPUNCTURE: CPT

## 2019-04-05 PROCEDURE — 83721 ASSAY OF BLOOD LIPOPROTEIN: CPT

## 2019-04-12 ENCOUNTER — OFFICE VISIT (OUTPATIENT)
Dept: FAMILY MEDICINE CLINIC | Facility: CLINIC | Age: 64
End: 2019-04-12

## 2019-04-12 VITALS
BODY MASS INDEX: 28.24 KG/M2 | HEART RATE: 76 BPM | DIASTOLIC BLOOD PRESSURE: 70 MMHG | OXYGEN SATURATION: 96 % | HEIGHT: 70 IN | SYSTOLIC BLOOD PRESSURE: 130 MMHG | WEIGHT: 197.3 LBS

## 2019-04-12 DIAGNOSIS — G62.9 PERIPHERAL POLYNEUROPATHY: Chronic | ICD-10-CM

## 2019-04-12 DIAGNOSIS — E78.5 HYPERLIPIDEMIA, UNSPECIFIED HYPERLIPIDEMIA TYPE: ICD-10-CM

## 2019-04-12 DIAGNOSIS — I10 ESSENTIAL HYPERTENSION: Chronic | ICD-10-CM

## 2019-04-12 DIAGNOSIS — E53.8 B12 DEFICIENCY: ICD-10-CM

## 2019-04-12 DIAGNOSIS — Z12.5 ENCOUNTER FOR PROSTATE CANCER SCREENING: ICD-10-CM

## 2019-04-12 DIAGNOSIS — Z00.00 WELCOME TO MEDICARE PREVENTIVE VISIT: Primary | ICD-10-CM

## 2019-04-12 PROCEDURE — G0402 INITIAL PREVENTIVE EXAM: HCPCS | Performed by: GENERAL PRACTICE

## 2019-04-12 PROCEDURE — 99213 OFFICE O/P EST LOW 20 MIN: CPT | Performed by: GENERAL PRACTICE

## 2019-04-12 RX ORDER — AMITRIPTYLINE HYDROCHLORIDE 10 MG/1
10 TABLET, FILM COATED ORAL NIGHTLY
Qty: 90 TABLET | Refills: 1 | Status: SHIPPED | OUTPATIENT
Start: 2019-04-12 | End: 2020-06-02

## 2019-04-12 NOTE — PATIENT INSTRUCTIONS
Medicare Wellness  Personal Prevention Plan of Service     Date of Office Visit:  2019  Encounter Provider:  Melvi Hernandez MD  Place of Service:  [facility]  Patient Name: Ben Abebe  :  1955    As part of the Medicare Wellness portion of your visit today, we are providing you with this personalized preventive plan of services (PPPS). This plan is based upon recommendations of the [place] Preventive Services Task Force (USPSTF) and the Advisory Committee on Immunization Practices (ACIP).    This lists the preventive care services that should be considered, and provides dates of when you are due. Items listed as completed are up-to-date and do not require any further intervention.    Health Maintenance   Topic Date Due   • PNEUMOCOCCAL VACCINES (65+ LOW/MEDIUM RISK) (1 of 2 - PCV13) 2019   • INFLUENZA VACCINE  2019   • LIPID PANEL  2020   • MEDICARE ANNUAL WELLNESS  2020   • COLONOSCOPY  2021   • TDAP/TD VACCINES (2 - Td) 10/11/2026   • HEPATITIS C SCREENING  Completed   • ZOSTER VACCINE  Completed       No orders of the defined types were placed in this encounter.      Return in about 6 months (around 10/12/2019) for Annual physical.

## 2019-04-12 NOTE — PROGRESS NOTES
Subjective   Ben Abebe is a 64 y.o. male.   Chief Complaint   Patient presents with   • Hypertension     welcome to medicare     For review and evaluation of management of chronic medical problems.  Neuropathy is fairly well controlled although still has some pain in his feet.  Unfortunately is not able to take gabapentin or pregabalin as he had a reaction to the pregabalin.  Is wondering if there is an alternative to treatment.  Hypertension   This is a chronic problem. The current episode started more than 1 year ago. The problem is unchanged. The problem is uncontrolled. Pertinent negatives include no chest pain, headaches, neck pain, palpitations or shortness of breath. There are no associated agents to hypertension. Current antihypertension treatment includes beta blockers, angiotensin blockers and diuretics. The current treatment provides moderate improvement. There are no compliance problems.       The following portions of the patient's history were reviewed and updated as appropriate: allergies, current medications, past social history and problem list.    Outpatient Medications Prior to Visit   Medication Sig Dispense Refill   • Acetaminophen (TYLENOL EXTRA STRENGTH PO) Take 1 tablet by mouth As Needed.     • amLODIPine (NORVASC) 10 MG tablet Take 1 tablet by mouth Daily. 90 tablet 3   • celecoxib (CeleBREX) 200 MG capsule Take 1 capsule by mouth As Needed for Mild Pain . 90 capsule 3   • cetirizine (zyrTEC) 10 MG tablet Take 1 tablet by mouth Daily. 30 tablet 0   • dicyclomine (BENTYL) 20 MG tablet Take 1 tablet by mouth 4 (Four) Times a Day As Needed (bowel problem). TAKE 1 q 6-8 HOURS AS NEEDED FOR BOWEL PROBLEM 90 tablet 1   • EPINEPHrine (EPIPEN) 0.3 MG/0.3ML solution auto-injector injection Use as directed 1 each 2   • fenofibrate (TRICOR) 145 MG tablet Take 1 tablet by mouth Daily. 90 tablet 1   • fluticasone (FLONASE) 50 MCG/ACT nasal spray 2 sprays into the nostril(s) as directed by  provider Daily. 1 bottle 3   • folic acid (FOLVITE) 1 MG tablet Take 1 mg by mouth Daily.     • magnesium gluconate (MAGONATE) 500 MG tablet Take 500 mg by mouth Daily.     • metoprolol succinate XL (TOPROL XL) 50 MG 24 hr tablet Take 1 tablet by mouth Daily. 90 tablet 3   • Multiple Vitamins-Minerals (CENTRUM SILVER ULTRA MENS PO) Take 1 tablet by mouth Daily.     • raNITIdine (ZANTAC) 150 MG tablet Take 150 mg by mouth Daily As Needed.     • SUMAtriptan (IMITREX) 50 MG tablet Take 1 tablet by mouth Every 2 (Two) Hours As Needed for Migraine. 9 tablet 2   • tiZANidine (ZANAFLEX) 4 MG tablet 1-2 tabs qhs 180 tablet 3   • valsartan-hydrochlorothiazide (DIOVAN-HCT) 320-25 MG per tablet Take 1 tablet by mouth Daily. 90 tablet 1   • vitamin B-12 (CYANOCOBALAMIN) 2500 MCG sublingual tablet tablet Place 5,000 mcg under the tongue Daily.     • amoxicillin (AMOXIL) 875 MG tablet Take 1 tablet by mouth 2 (Two) Times a Day. 20 tablet 0   • traZODone (DESYREL) 50 MG tablet TAKE 1 TO 2 TABLETS BY MOUTH EVERY NIGHT AT BEDTIME FOR SLEEP 180 tablet 1     No facility-administered medications prior to visit.        Review of Systems   Constitutional: Negative.  Negative for chills, fatigue, fever and unexpected weight change.   HENT: Negative.  Negative for congestion, ear pain, hearing loss, nosebleeds, rhinorrhea, sneezing, sore throat and tinnitus.    Eyes: Negative.  Negative for discharge.   Respiratory: Negative.  Negative for cough, shortness of breath and wheezing.    Cardiovascular: Negative.  Negative for chest pain and palpitations.   Gastrointestinal: Negative.  Negative for abdominal pain, constipation, diarrhea, nausea and vomiting.   Endocrine: Negative.    Genitourinary: Negative.  Negative for dysuria, frequency and urgency.   Musculoskeletal: Negative.  Negative for arthralgias, back pain, joint swelling, myalgias and neck pain.   Skin: Negative.  Negative for rash.   Allergic/Immunologic: Negative.   "  Neurological: Negative.  Negative for dizziness, weakness, numbness and headaches.   Hematological: Negative.  Negative for adenopathy.   Psychiatric/Behavioral: Negative.  Negative for dysphoric mood and sleep disturbance. The patient is not nervous/anxious.        Objective   Visit Vitals  /70   Pulse 76   Ht 177.8 cm (70\")   Wt 89.5 kg (197 lb 4.8 oz)   SpO2 96%   BMI 28.31 kg/m²     Physical Exam   Constitutional: He is oriented to person, place, and time. He appears well-developed and well-nourished. No distress.   HENT:   Head: Normocephalic.   Nose: Nose normal.   Mouth/Throat: Oropharynx is clear and moist.   Eyes: Conjunctivae and EOM are normal. Pupils are equal, round, and reactive to light. Right eye exhibits no discharge. Left eye exhibits no discharge.   Neck: No thyromegaly present.   Cardiovascular: Normal rate, regular rhythm, normal heart sounds and intact distal pulses.   No murmur heard.  Pulmonary/Chest: Effort normal and breath sounds normal.   Musculoskeletal: He exhibits no edema.   Lymphadenopathy:     He has no cervical adenopathy.   Neurological: He is alert and oriented to person, place, and time.   Skin: Skin is warm and dry.   Psychiatric: He has a normal mood and affect.   Nursing note and vitals reviewed.      Assessment/Plan   Problem List Items Addressed This Visit        Cardiovascular and Mediastinum    Essential hypertension (Chronic)    Relevant Orders    Comprehensive Metabolic Panel    Lipid Panel    PSA Screen    Urinalysis With Microscopic - Urine, Clean Catch    Hyperlipidemia       Digestive    B12 deficiency    Relevant Orders    Vitamin B12       Nervous and Auditory    Peripheral neuropathy (Chronic)    Relevant Medications    amitriptyline (ELAVIL) 10 MG tablet      Other Visit Diagnoses     Welcome to Medicare preventive visit    -  Primary    Encounter for prostate cancer screening             Continue current treatment.  Try Elavil for his peripheral " neuropathy.    New Medications Ordered This Visit   Medications   • amitriptyline (ELAVIL) 10 MG tablet     Sig: Take 1 tablet by mouth Every Night.     Dispense:  90 tablet     Refill:  1     Return in about 6 months (around 10/12/2019) for Annual physical.

## 2019-04-12 NOTE — PROGRESS NOTES
Welcome to Medicare Wellness Visit   The ABC's of the Annual Wellness Visit    Chief Complaint   Patient presents with   • Hypertension     welcome to medicare       HPI:  Ben Abebe, -1955, is a 64 y.o. male who presents for a Welcome to Medicare Wellness Visit.    Recent Hospitalizations:  No hospitalization(s) within the last year..    Current Medical Providers:  Patient Care Team:  Melvi Hernandez MD as PCP - General    Health Habits and Functional and Cognitive Screening and Depression Screening:  Functional & Cognitive Status 2019   Do you have difficulty preparing food and eating? No   Do you have difficulty bathing yourself, getting dressed or grooming yourself? No   Do you have difficulty using the toilet? No   Do you have difficulty moving around from place to place? No   Do you have trouble with steps or getting out of a bed or a chair? No   In the past year have you fallen or experienced a near fall? No   Current Diet Well Balanced Diet   Dental Exam Up to date   Eye Exam Up to date   Exercise (times per week) 4 times per week   Current Exercise Activities Include Yard Work   Do you need help using the phone?  No   Are you deaf or do you have serious difficulty hearing?  No   Do you need help with transportation? No   Do you need help shopping? No   Do you need help preparing meals?  No   Do you need help with housework?  No   Do you need help with laundry? No   Do you need help taking your medications? No   Do you need help managing money? No   Do you ever drive or ride in a car without wearing a seat belt? No   Have you felt unusual stress, anger or loneliness in the last month? No   Who do you live with? Spouse   If you need help, do you have trouble finding someone available to you? No   Have you been bothered in the last four weeks by sexual problems? No   Do you have difficulty concentrating, remembering or making decisions? No       Compared to one year ago, the patient  feels his physical health is the same and his mental health is better.    Depression Screen:  PHQ-2/PHQ-9 Depression Screening 4/12/2019   Little interest or pleasure in doing things 0   Feeling down, depressed, or hopeless 0   Trouble falling or staying asleep, or sleeping too much 2   Feeling tired or having little energy 0   Poor appetite or overeating 0   Feeling bad about yourself - or that you are a failure or have let yourself or your family down 0   Trouble concentrating on things, such as reading the newspaper or watching television 0   Moving or speaking so slowly that other people could have noticed. Or the opposite - being so fidgety or restless that you have been moving around a lot more than usual 0   Thoughts that you would be better off dead, or of hurting yourself in some way 0   Total Score 2   If you checked off any problems, how difficult have these problems made it for you to do your work, take care of things at home, or get along with other people? Not difficult at all       Past Medical/Family/Social History:  The following portions of the patient's history were reviewed and updated as appropriate: allergies, current medications, past family history, past medical history, past social history, past surgical history and problem list.    Allergies   Allergen Reactions   • Lyrica [Pregabalin] Other (See Comments)     Causes blisters to form on skin   • Ambien [Zolpidem] Hallucinations   • Bee Venom Swelling     Extreme Swelling   • Codeine Nausea And Vomiting         Current Outpatient Medications:   •  Acetaminophen (TYLENOL EXTRA STRENGTH PO), Take 1 tablet by mouth As Needed., Disp: , Rfl:   •  amLODIPine (NORVASC) 10 MG tablet, Take 1 tablet by mouth Daily., Disp: 90 tablet, Rfl: 3  •  celecoxib (CeleBREX) 200 MG capsule, Take 1 capsule by mouth As Needed for Mild Pain ., Disp: 90 capsule, Rfl: 3  •  cetirizine (zyrTEC) 10 MG tablet, Take 1 tablet by mouth Daily., Disp: 30 tablet, Rfl: 0  •   dicyclomine (BENTYL) 20 MG tablet, Take 1 tablet by mouth 4 (Four) Times a Day As Needed (bowel problem). TAKE 1 q 6-8 HOURS AS NEEDED FOR BOWEL PROBLEM, Disp: 90 tablet, Rfl: 1  •  EPINEPHrine (EPIPEN) 0.3 MG/0.3ML solution auto-injector injection, Use as directed, Disp: 1 each, Rfl: 2  •  fenofibrate (TRICOR) 145 MG tablet, Take 1 tablet by mouth Daily., Disp: 90 tablet, Rfl: 1  •  fluticasone (FLONASE) 50 MCG/ACT nasal spray, 2 sprays into the nostril(s) as directed by provider Daily., Disp: 1 bottle, Rfl: 3  •  folic acid (FOLVITE) 1 MG tablet, Take 1 mg by mouth Daily., Disp: , Rfl:   •  magnesium gluconate (MAGONATE) 500 MG tablet, Take 500 mg by mouth Daily., Disp: , Rfl:   •  metoprolol succinate XL (TOPROL XL) 50 MG 24 hr tablet, Take 1 tablet by mouth Daily., Disp: 90 tablet, Rfl: 3  •  Multiple Vitamins-Minerals (CENTRUM SILVER ULTRA MENS PO), Take 1 tablet by mouth Daily., Disp: , Rfl:   •  raNITIdine (ZANTAC) 150 MG tablet, Take 150 mg by mouth Daily As Needed., Disp: , Rfl:   •  SUMAtriptan (IMITREX) 50 MG tablet, Take 1 tablet by mouth Every 2 (Two) Hours As Needed for Migraine., Disp: 9 tablet, Rfl: 2  •  tiZANidine (ZANAFLEX) 4 MG tablet, 1-2 tabs qhs, Disp: 180 tablet, Rfl: 3  •  valsartan-hydrochlorothiazide (DIOVAN-HCT) 320-25 MG per tablet, Take 1 tablet by mouth Daily., Disp: 90 tablet, Rfl: 1  •  vitamin B-12 (CYANOCOBALAMIN) 2500 MCG sublingual tablet tablet, Place 5,000 mcg under the tongue Daily., Disp: , Rfl:   •  amitriptyline (ELAVIL) 10 MG tablet, Take 1 tablet by mouth Every Night., Disp: 90 tablet, Rfl: 1    Aspirin use counseling: Taking ASA appropriately as indicated    Current medication list contains no high risk medications.  No harmful drug interactions have been identified.     Family History   Problem Relation Age of Onset   • Coronary artery disease Father    • Diabetes Father    • Hypertension Father    • Diabetes Mother    • Hypertension Mother    • Stroke Mother    •  "Cancer Sister    • Cancer Brother        Social History     Tobacco Use   • Smoking status: Never Smoker   • Smokeless tobacco: Never Used   Substance Use Topics   • Alcohol use: Yes       Past Surgical History:   Procedure Laterality Date   • COLECTOMY PARTIAL / TOTAL  11/2009   • LUMBAR DISC SURGERY  2000       Patient Active Problem List   Diagnosis   • Insomnia   • Hyperlipidemia   • B12 deficiency   • IBS (irritable bowel syndrome)   • Peripheral neuropathy   • RLS (restless legs syndrome)   • Anxiety   • Depression   • Essential hypertension   • Long term current use of anticoagulant       Review of Systems   Constitutional: Negative.  Negative for chills, fatigue, fever and unexpected weight change.   HENT: Negative.  Negative for congestion, ear pain, hearing loss, nosebleeds, rhinorrhea, sneezing, sore throat and tinnitus.    Eyes: Negative.  Negative for discharge.   Respiratory: Negative.  Negative for cough, shortness of breath and wheezing.    Cardiovascular: Negative.  Negative for chest pain and palpitations.   Gastrointestinal: Negative.  Negative for abdominal pain, constipation, diarrhea, nausea and vomiting.   Endocrine: Negative.    Genitourinary: Negative.  Negative for dysuria, frequency and urgency.   Musculoskeletal: Negative.  Negative for arthralgias, back pain, joint swelling, myalgias and neck pain.   Skin: Negative.  Negative for rash.   Allergic/Immunologic: Negative.    Neurological: Negative.  Negative for dizziness, weakness, numbness and headaches.   Hematological: Negative.  Negative for adenopathy.   Psychiatric/Behavioral: Negative.  Negative for dysphoric mood and sleep disturbance. The patient is not nervous/anxious.        Objective     Vitals:    04/12/19 1448   BP: 130/70   Pulse: 76   SpO2: 96%   Weight: 89.5 kg (197 lb 4.8 oz)   Height: 177.8 cm (70\")   PainSc: 0-No pain       Patient's Body mass index is 28.31 kg/m². BMI is above normal parameters. Recommendations " include: exercise counseling and nutrition counseling.       Visual Acuity Screening    Right eye Left eye Both eyes   Without correction: 20/200 20/200 20/200   With correction: 20/15 20/25 20/15       The patient has no evidence of cognitve impairment.     Physical Exam   Constitutional: He is oriented to person, place, and time. He appears well-developed and well-nourished. No distress.   HENT:   Nose: Nose normal.   Mouth/Throat: Oropharynx is clear and moist.   Eyes: Conjunctivae and EOM are normal. Pupils are equal, round, and reactive to light.   Neck: Normal range of motion. Neck supple. No JVD present. No tracheal deviation present. No thyromegaly present.   Cardiovascular: Normal rate, regular rhythm and normal heart sounds.   No murmur heard.  Pulmonary/Chest: Effort normal and breath sounds normal. No stridor. No respiratory distress. He has no wheezes. He has no rales. He exhibits no tenderness.   Abdominal: Soft. Bowel sounds are normal. He exhibits no distension and no mass. There is no tenderness. No hernia.   Musculoskeletal: Normal range of motion.   Lymphadenopathy:     He has no cervical adenopathy.   Neurological: He is alert and oriented to person, place, and time. He has normal reflexes.   Skin: Skin is warm and dry.   Psychiatric: He has a normal mood and affect. His behavior is normal. Judgment and thought content normal.        Procedures    Recent Lab Results:     Lab Results   Component Value Date    CHOL 222 (H) 10/03/2018    TRIG 233 (H) 10/03/2018    HDL 38 (L) 10/03/2018    LDLHDL 3.62 (H) 10/03/2018       Assessment/Plan   Age-appropriate Screening Schedule:  Refer to the list below for future screening recommendations based on patient's age, sex and/or medical conditions.      Health Maintenance   Topic Date Due   • PNEUMOCOCCAL VACCINES (65+ LOW/MEDIUM RISK) (1 of 2 - PCV13) 04/22/2019   • INFLUENZA VACCINE  08/01/2019   • LIPID PANEL  04/05/2020   • COLONOSCOPY  02/22/2021   •  TDAP/TD VACCINES (2 - Td) 10/11/2026   • ZOSTER VACCINE  Completed       Medicare Risks and Personalized Health Plan:  weight , unhealthy diet, inactivity and chronic pain      CMS-Preventive Services Quick Reference  Medicare Preventive Services Addressed:  Advance directive, Pneumococcal vaccine     Advance Care Planning:  Patient does not have an advance directive - information provided to the patient today    Diagnoses and all orders for this visit:    1. Welcome to Medicare preventive visit (Primary)    2. Essential hypertension    3. Peripheral polyneuropathy  -     amitriptyline (ELAVIL) 10 MG tablet; Take 1 tablet by mouth Every Night.  Dispense: 90 tablet; Refill: 1        An After Visit Summary and PPPS with all of these plans were given to the patient.    Information has been scanned into chart.  Discussed importance of taking medications as prescribed. Encouraged healthy eating habits with low fat, low salt choices and working towards maintaining a healthy weight. Recommended regular exercise if able as well as care to prevent falls including avoiding anything on the floor that they could slip or trip on such as throw rugs, making sure they have a bathmat to step onto when their feet are wet and having grab bars and railings where needed.    Follow Up:  Return in about 6 months (around 10/12/2019) for Annual physical.

## 2019-05-28 ENCOUNTER — APPOINTMENT (OUTPATIENT)
Dept: LAB | Facility: HOSPITAL | Age: 64
End: 2019-05-28

## 2019-05-28 ENCOUNTER — OFFICE VISIT (OUTPATIENT)
Dept: FAMILY MEDICINE CLINIC | Facility: CLINIC | Age: 64
End: 2019-05-28

## 2019-05-28 VITALS
WEIGHT: 194.8 LBS | HEART RATE: 64 BPM | BODY MASS INDEX: 27.89 KG/M2 | OXYGEN SATURATION: 98 % | HEIGHT: 70 IN | SYSTOLIC BLOOD PRESSURE: 140 MMHG | DIASTOLIC BLOOD PRESSURE: 80 MMHG

## 2019-05-28 DIAGNOSIS — M51.36 DEGENERATIVE DISC DISEASE, LUMBAR: Primary | ICD-10-CM

## 2019-05-28 DIAGNOSIS — M54.42 ACUTE LEFT-SIDED LOW BACK PAIN WITH LEFT-SIDED SCIATICA: ICD-10-CM

## 2019-05-28 PROCEDURE — 80307 DRUG TEST PRSMV CHEM ANLYZR: CPT | Performed by: GENERAL PRACTICE

## 2019-05-28 PROCEDURE — 96372 THER/PROPH/DIAG INJ SC/IM: CPT | Performed by: GENERAL PRACTICE

## 2019-05-28 PROCEDURE — 99214 OFFICE O/P EST MOD 30 MIN: CPT | Performed by: GENERAL PRACTICE

## 2019-05-28 RX ORDER — CYCLOBENZAPRINE HCL 10 MG
10 TABLET ORAL 3 TIMES DAILY PRN
Qty: 30 TABLET | Refills: 1 | Status: SHIPPED | OUTPATIENT
Start: 2019-05-28 | End: 2019-05-28 | Stop reason: SDUPTHER

## 2019-05-28 RX ORDER — TRIAMCINOLONE ACETONIDE 40 MG/ML
80 INJECTION, SUSPENSION INTRA-ARTICULAR; INTRAMUSCULAR ONCE
Status: COMPLETED | OUTPATIENT
Start: 2019-05-28 | End: 2019-05-28

## 2019-05-28 RX ORDER — OXYCODONE AND ACETAMINOPHEN 10; 325 MG/1; MG/1
1 TABLET ORAL EVERY 6 HOURS PRN
Qty: 28 TABLET | Refills: 0 | Status: SHIPPED | OUTPATIENT
Start: 2019-05-28 | End: 2019-10-14

## 2019-05-28 RX ORDER — CYCLOBENZAPRINE HCL 10 MG
10 TABLET ORAL 3 TIMES DAILY PRN
Qty: 30 TABLET | Refills: 1 | Status: SHIPPED | OUTPATIENT
Start: 2019-05-28 | End: 2019-06-19

## 2019-05-28 RX ADMIN — TRIAMCINOLONE ACETONIDE 80 MG: 40 INJECTION, SUSPENSION INTRA-ARTICULAR; INTRAMUSCULAR at 15:02

## 2019-05-28 NOTE — PROGRESS NOTES
Subjective   Ben Abebe is a 64 y.o. male.   Chief Complaint   Patient presents with   • Back Pain     Back Pain   This is a recurrent problem. The current episode started in the past 7 days. The problem occurs constantly. The problem is unchanged. The pain is present in the lumbar spine. The quality of the pain is described as burning, cramping and stabbing. The pain radiates to the left thigh. The pain is at a severity of 10/10. The pain is moderate. The pain is worse during the day. The symptoms are aggravated by bending, position and standing. Stiffness is present in the morning. Associated symptoms include leg pain. Pertinent negatives include no abdominal pain, chest pain, dysuria, fever, headaches, numbness, paresthesias, perianal numbness or weakness. He has tried muscle relaxant, NSAIDs and analgesics for the symptoms. The treatment provided moderate relief.   Acute on chronic, known degenerative disc disease. Thinks related to riding in a boat and then riding on the mower.      The following portions of the patient's history were reviewed and updated as appropriate: allergies, current medications, past social history and problem list.    Outpatient Medications Prior to Visit   Medication Sig Dispense Refill   • Acetaminophen (TYLENOL EXTRA STRENGTH PO) Take 1 tablet by mouth As Needed.     • amitriptyline (ELAVIL) 10 MG tablet Take 1 tablet by mouth Every Night. 90 tablet 1   • amLODIPine (NORVASC) 10 MG tablet Take 1 tablet by mouth Daily. 90 tablet 3   • celecoxib (CeleBREX) 200 MG capsule Take 1 capsule by mouth As Needed for Mild Pain . 90 capsule 3   • cetirizine (zyrTEC) 10 MG tablet Take 1 tablet by mouth Daily. 30 tablet 0   • dicyclomine (BENTYL) 20 MG tablet Take 1 tablet by mouth 4 (Four) Times a Day As Needed (bowel problem). TAKE 1 q 6-8 HOURS AS NEEDED FOR BOWEL PROBLEM 90 tablet 1   • EPINEPHrine (EPIPEN) 0.3 MG/0.3ML solution auto-injector injection Use as directed 1 each 2   •  fenofibrate (TRICOR) 145 MG tablet Take 1 tablet by mouth Daily. 90 tablet 1   • fluticasone (FLONASE) 50 MCG/ACT nasal spray 2 sprays into the nostril(s) as directed by provider Daily. 1 bottle 3   • folic acid (FOLVITE) 1 MG tablet Take 1 mg by mouth Daily.     • magnesium gluconate (MAGONATE) 500 MG tablet Take 500 mg by mouth Daily.     • metoprolol succinate XL (TOPROL XL) 50 MG 24 hr tablet Take 1 tablet by mouth Daily. 90 tablet 3   • Multiple Vitamins-Minerals (CENTRUM SILVER ULTRA MENS PO) Take 1 tablet by mouth Daily.     • raNITIdine (ZANTAC) 150 MG tablet Take 150 mg by mouth Daily As Needed.     • SUMAtriptan (IMITREX) 50 MG tablet Take 1 tablet by mouth Every 2 (Two) Hours As Needed for Migraine. 9 tablet 2   • tiZANidine (ZANAFLEX) 4 MG tablet 1-2 tabs qhs 180 tablet 3   • valsartan-hydrochlorothiazide (DIOVAN-HCT) 320-25 MG per tablet Take 1 tablet by mouth Daily. 90 tablet 1   • vitamin B-12 (CYANOCOBALAMIN) 2500 MCG sublingual tablet tablet Place 5,000 mcg under the tongue Daily.       No facility-administered medications prior to visit.        Review of Systems   Constitutional: Negative.  Negative for chills, fatigue, fever and unexpected weight change.   HENT: Negative.  Negative for congestion, ear pain, hearing loss, nosebleeds, rhinorrhea, sneezing, sore throat and tinnitus.    Eyes: Negative.  Negative for discharge.   Respiratory: Negative.  Negative for cough, shortness of breath and wheezing.    Cardiovascular: Negative.  Negative for chest pain and palpitations.   Gastrointestinal: Negative.  Negative for abdominal pain, constipation, diarrhea, nausea and vomiting.   Endocrine: Negative.    Genitourinary: Negative.  Negative for dysuria, frequency and urgency.   Musculoskeletal: Positive for back pain. Negative for arthralgias, joint swelling, myalgias and neck pain.   Skin: Negative.  Negative for rash.   Allergic/Immunologic: Negative.    Neurological: Negative.  Negative for  "dizziness, weakness, numbness, headaches and paresthesias.   Hematological: Negative.  Negative for adenopathy.   Psychiatric/Behavioral: Negative.  Negative for dysphoric mood and sleep disturbance. The patient is not nervous/anxious.        Objective   Visit Vitals  /80 (BP Location: Left arm)   Pulse 64   Ht 177.8 cm (70\")   Wt 88.4 kg (194 lb 12.8 oz)   SpO2 98%   BMI 27.95 kg/m²     Physical Exam   Constitutional: He is oriented to person, place, and time. He appears well-developed and well-nourished. No distress.   HENT:   Head: Normocephalic.   Nose: Nose normal.   Mouth/Throat: Oropharynx is clear and moist.   Eyes: Conjunctivae and EOM are normal. Pupils are equal, round, and reactive to light. Right eye exhibits no discharge. Left eye exhibits no discharge.   Neck: No thyromegaly present.   Cardiovascular: Normal rate, regular rhythm, normal heart sounds and intact distal pulses.   No murmur heard.  Pulmonary/Chest: Effort normal and breath sounds normal.   Musculoskeletal: He exhibits no edema.        Lumbar back: He exhibits decreased range of motion, tenderness, pain and spasm.   Very tight paraspinals, unable to lie flat to do straight leg raise    Lymphadenopathy:     He has no cervical adenopathy.   Neurological: He is alert and oriented to person, place, and time.   Skin: Skin is warm and dry.   Psychiatric: He has a normal mood and affect.   Nursing note and vitals reviewed.    Assessment/Plan   Problem List Items Addressed This Visit     None      Visit Diagnoses     Degenerative disc disease, lumbar    -  Primary    Relevant Medications    triamcinolone acetonide (KENALOG-40) injection 80 mg    Other Relevant Orders    Urine Drug Screen - Urine, Clean Catch    Acute left-sided low back pain with left-sided sciatica        Relevant Medications    triamcinolone acetonide (KENALOG-40) injection 80 mg    Other Relevant Orders    Urine Drug Screen - Urine, Clean Catch          Recheck if not " improving. May need MRI.  Zachariah reviewed and appropriate. Not recommended to drive or operate heavy equipment while taking potentially sedating meds.     New Medications Ordered This Visit   Medications   • oxyCODONE-acetaminophen (PERCOCET)  MG per tablet     Sig: Take 1 tablet by mouth Every 6 (Six) Hours As Needed for Moderate Pain .     Dispense:  28 tablet     Refill:  0   • cyclobenzaprine (FLEXERIL) 10 MG tablet     Sig: Take 1 tablet by mouth 3 (Three) Times a Day As Needed for Muscle Spasms.     Dispense:  30 tablet     Refill:  1   • triamcinolone acetonide (KENALOG-40) injection 80 mg     Return if symptoms worsen or fail to improve, for Next scheduled follow up.

## 2019-05-29 LAB
AMPHET+METHAMPHET UR QL: NEGATIVE
BARBITURATES UR QL SCN: NEGATIVE
BENZODIAZ UR QL SCN: NEGATIVE
CANNABINOIDS SERPL QL: NEGATIVE
COCAINE UR QL: NEGATIVE
METHADONE UR QL SCN: NEGATIVE
OPIATES UR QL: NEGATIVE
OXYCODONE UR QL SCN: POSITIVE

## 2019-06-03 ENCOUNTER — TELEPHONE (OUTPATIENT)
Dept: FAMILY MEDICINE CLINIC | Facility: CLINIC | Age: 64
End: 2019-06-03

## 2019-06-03 RX ORDER — FENOFIBRATE 145 MG/1
145 TABLET, COATED ORAL DAILY
Qty: 90 TABLET | Refills: 2 | Status: SHIPPED | OUTPATIENT
Start: 2019-06-03 | End: 2020-02-17

## 2019-06-03 NOTE — TELEPHONE ENCOUNTER
Requested Refills Sent to [facility] in [place]        Regarding: Prescription Question  Contact: [phone]  ----- Message from [employer], Generic sent at 6/2/2019 10:25 AM EDT -----    Would you please call in a refill for Fenofibrate tablets 145mg to [facility] in [place].    Thank you    Ben Abebe

## 2019-06-10 ENCOUNTER — TELEPHONE (OUTPATIENT)
Dept: FAMILY MEDICINE CLINIC | Facility: CLINIC | Age: 64
End: 2019-06-10

## 2019-06-10 RX ORDER — VALSARTAN AND HYDROCHLOROTHIAZIDE 320; 25 MG/1; MG/1
1 TABLET, FILM COATED ORAL DAILY
Qty: 90 TABLET | Refills: 0 | Status: SHIPPED | OUTPATIENT
Start: 2019-06-10 | End: 2019-08-23 | Stop reason: SDUPTHER

## 2019-06-10 RX ORDER — VALSARTAN AND HYDROCHLOROTHIAZIDE 320; 25 MG/1; MG/1
1 TABLET, FILM COATED ORAL DAILY
Qty: 90 TABLET | Refills: 0 | Status: SHIPPED | OUTPATIENT
Start: 2019-06-10 | End: 2019-06-10 | Stop reason: SDUPTHER

## 2019-06-10 NOTE — TELEPHONE ENCOUNTER
Ben is out of town and is completely out of his valsartan and is out of town.  Asking if this can be called to the [facility] on [address] [place], [place]  [phone] and if someone could call him and let him know about this?   His number is [phone]

## 2019-06-10 NOTE — TELEPHONE ENCOUNTER
Requested Refills Sent to [facility] in [place]    Patient has been called.

## 2019-06-19 ENCOUNTER — OFFICE VISIT (OUTPATIENT)
Dept: FAMILY MEDICINE CLINIC | Facility: CLINIC | Age: 64
End: 2019-06-19

## 2019-06-19 VITALS
OXYGEN SATURATION: 99 % | HEART RATE: 67 BPM | HEIGHT: 70 IN | BODY MASS INDEX: 26.56 KG/M2 | DIASTOLIC BLOOD PRESSURE: 70 MMHG | SYSTOLIC BLOOD PRESSURE: 140 MMHG | WEIGHT: 185.5 LBS

## 2019-06-19 DIAGNOSIS — M51.36 DEGENERATIVE DISC DISEASE, LUMBAR: Chronic | ICD-10-CM

## 2019-06-19 DIAGNOSIS — M54.42 ACUTE LEFT-SIDED LOW BACK PAIN WITH LEFT-SIDED SCIATICA: Primary | ICD-10-CM

## 2019-06-19 PROCEDURE — 99213 OFFICE O/P EST LOW 20 MIN: CPT | Performed by: GENERAL PRACTICE

## 2019-06-19 RX ORDER — TIZANIDINE 4 MG/1
TABLET ORAL
Qty: 360 TABLET | Refills: 0 | Status: SHIPPED | OUTPATIENT
Start: 2019-06-19 | End: 2019-06-19 | Stop reason: SDUPTHER

## 2019-06-19 RX ORDER — TIZANIDINE 4 MG/1
TABLET ORAL
Qty: 360 TABLET | Refills: 0 | Status: SHIPPED | OUTPATIENT
Start: 2019-06-19 | End: 2019-12-23 | Stop reason: SDUPTHER

## 2019-06-19 NOTE — PROGRESS NOTES
Subjective   Ben Abebe is a 64 y.o. male.   Chief Complaint   Patient presents with   • Back Pain       Back Pain   This is a recurrent problem. The current episode started 1 to 4 weeks ago. The problem occurs constantly. The problem has been gradually improving since onset. The pain is present in the lumbar spine. The quality of the pain is described as burning, cramping and stabbing. The pain radiates to the left thigh. The pain is at a severity of 2/10. The pain is moderate. The pain is worse during the day. The symptoms are aggravated by bending, position and standing. Stiffness is present in the morning. Associated symptoms include leg pain. Pertinent negatives include no abdominal pain, chest pain, dysuria, fever, headaches, numbness, paresthesias, perianal numbness or weakness. He has tried muscle relaxant, NSAIDs and analgesics for the symptoms. The treatment provided moderate relief.   Acute on chronic, known degenerative disc disease. Thinks related to riding in a boat and then riding on the mower. Has started to improve over the last week.      The following portions of the patient's history were reviewed and updated as appropriate: allergies, current medications, past social history and problem list.    Outpatient Medications Prior to Visit   Medication Sig Dispense Refill   • Acetaminophen (TYLENOL EXTRA STRENGTH PO) Take 1 tablet by mouth As Needed.     • amitriptyline (ELAVIL) 10 MG tablet Take 1 tablet by mouth Every Night. 90 tablet 1   • amLODIPine (NORVASC) 10 MG tablet Take 1 tablet by mouth Daily. 90 tablet 3   • celecoxib (CeleBREX) 200 MG capsule Take 1 capsule by mouth As Needed for Mild Pain . 90 capsule 3   • cetirizine (zyrTEC) 10 MG tablet Take 1 tablet by mouth Daily. 30 tablet 0   • dicyclomine (BENTYL) 20 MG tablet Take 1 tablet by mouth 4 (Four) Times a Day As Needed (bowel problem). TAKE 1 q 6-8 HOURS AS NEEDED FOR BOWEL PROBLEM 90 tablet 1   • EPINEPHrine (EPIPEN) 0.3  MG/0.3ML solution auto-injector injection Use as directed 1 each 2   • fenofibrate (TRICOR) 145 MG tablet Take 1 tablet by mouth Daily. 90 tablet 2   • fluticasone (FLONASE) 50 MCG/ACT nasal spray 2 sprays into the nostril(s) as directed by provider Daily. 1 bottle 3   • folic acid (FOLVITE) 1 MG tablet Take 1 mg by mouth Daily.     • magnesium gluconate (MAGONATE) 500 MG tablet Take 500 mg by mouth Daily.     • metoprolol succinate XL (TOPROL XL) 50 MG 24 hr tablet Take 1 tablet by mouth Daily. 90 tablet 3   • Multiple Vitamins-Minerals (CENTRUM SILVER ULTRA MENS PO) Take 1 tablet by mouth Daily.     • oxyCODONE-acetaminophen (PERCOCET)  MG per tablet Take 1 tablet by mouth Every 6 (Six) Hours As Needed for Moderate Pain . 28 tablet 0   • raNITIdine (ZANTAC) 150 MG tablet Take 150 mg by mouth Daily As Needed.     • SUMAtriptan (IMITREX) 50 MG tablet Take 1 tablet by mouth Every 2 (Two) Hours As Needed for Migraine. 9 tablet 2   • valsartan-hydrochlorothiazide (DIOVAN-HCT) 320-25 MG per tablet Take 1 tablet by mouth Daily. 90 tablet 0   • vitamin B-12 (CYANOCOBALAMIN) 2500 MCG sublingual tablet tablet Place 5,000 mcg under the tongue Daily.     • cyclobenzaprine (FLEXERIL) 10 MG tablet Take 1 tablet by mouth 3 (Three) Times a Day As Needed for Muscle Spasms. 30 tablet 1   • tiZANidine (ZANAFLEX) 4 MG tablet 1-2 tabs qhs 180 tablet 3     No facility-administered medications prior to visit.      Review of Systems   Constitutional: Negative.  Negative for chills, fatigue, fever and unexpected weight change.   HENT: Negative.  Negative for congestion, ear pain, hearing loss, nosebleeds, rhinorrhea, sneezing, sore throat and tinnitus.    Eyes: Negative.  Negative for discharge.   Respiratory: Negative.  Negative for cough, shortness of breath and wheezing.    Cardiovascular: Negative.  Negative for chest pain and palpitations.   Gastrointestinal: Negative.  Negative for abdominal pain, constipation, diarrhea,  "nausea and vomiting.   Endocrine: Negative.    Genitourinary: Negative.  Negative for dysuria, frequency and urgency.   Musculoskeletal: Positive for back pain. Negative for arthralgias, joint swelling, myalgias and neck pain.   Skin: Negative.  Negative for rash.   Allergic/Immunologic: Negative.    Neurological: Negative.  Negative for dizziness, weakness, numbness, headaches and paresthesias.   Hematological: Negative.  Negative for adenopathy.   Psychiatric/Behavioral: Negative.  Negative for dysphoric mood and sleep disturbance. The patient is not nervous/anxious.      Objective   Visit Vitals  /70 (BP Location: Left arm)   Pulse 67   Ht 177.8 cm (70\")   Wt 84.1 kg (185 lb 8 oz)   SpO2 99%   BMI 26.62 kg/m²     Physical Exam   Constitutional: He is oriented to person, place, and time. He appears well-developed and well-nourished. No distress.   HENT:   Head: Normocephalic.   Nose: Nose normal.   Mouth/Throat: Oropharynx is clear and moist.   Eyes: Conjunctivae and EOM are normal. Pupils are equal, round, and reactive to light. Right eye exhibits no discharge. Left eye exhibits no discharge.   Neck: No thyromegaly present.   Cardiovascular: Normal rate, regular rhythm, normal heart sounds and intact distal pulses.   No murmur heard.  Pulmonary/Chest: Effort normal and breath sounds normal.   Musculoskeletal: He exhibits no edema.        Lumbar back: He exhibits decreased range of motion, tenderness, pain and spasm.   Very tight paraspinals, unable to lie flat to do straight leg raise    Lymphadenopathy:     He has no cervical adenopathy.   Neurological: He is alert and oriented to person, place, and time.   Skin: Skin is warm and dry.   Psychiatric: He has a normal mood and affect.   Nursing note and vitals reviewed.    Assessment/Plan   Problem List Items Addressed This Visit        Musculoskeletal and Integument    Degenerative disc disease, lumbar (Chronic)      Other Visit Diagnoses     Acute " left-sided low back pain with left-sided sciatica    -  Primary        Continue current treatment. Start physical therapy.    New Medications Ordered This Visit   Medications   • tiZANidine (ZANAFLEX) 4 MG tablet     Si-2 tabs tid prn     Dispense:  360 tablet     Refill:  0     Return for Next scheduled follow up.

## 2019-07-05 ENCOUNTER — TELEPHONE (OUTPATIENT)
Dept: FAMILY MEDICINE CLINIC | Facility: CLINIC | Age: 64
End: 2019-07-05

## 2019-07-05 RX ORDER — METOPROLOL SUCCINATE 50 MG/1
50 TABLET, EXTENDED RELEASE ORAL DAILY
Qty: 90 TABLET | Refills: 3 | Status: SHIPPED | OUTPATIENT
Start: 2019-07-05 | End: 2020-07-06 | Stop reason: SDUPTHER

## 2019-07-05 NOTE — TELEPHONE ENCOUNTER
Requested Refills Sent    Regarding: Prescription Question  Contact: [phone]  ----- Message from [employer], Generic sent at 7/5/2019  8:42 AM EDT -----    Would you please call in some refills for Metoprolol ER Succinate 50mg Tabs to [facility] in [place].    Thank you    Ben Abebe

## 2019-08-23 RX ORDER — VALSARTAN AND HYDROCHLOROTHIAZIDE 320; 25 MG/1; MG/1
1 TABLET, FILM COATED ORAL DAILY
Qty: 90 TABLET | Refills: 1 | Status: SHIPPED | OUTPATIENT
Start: 2019-08-23 | End: 2019-08-30

## 2019-08-30 ENCOUNTER — TELEPHONE (OUTPATIENT)
Dept: FAMILY MEDICINE CLINIC | Facility: CLINIC | Age: 64
End: 2019-08-30

## 2019-08-30 RX ORDER — IRBESARTAN AND HYDROCHLOROTHIAZIDE 300; 12.5 MG/1; MG/1
1 TABLET, FILM COATED ORAL DAILY
Qty: 90 TABLET | Refills: 3 | Status: SHIPPED | OUTPATIENT
Start: 2019-08-30 | End: 2020-08-27 | Stop reason: SDUPTHER

## 2019-10-03 ENCOUNTER — FLU SHOT (OUTPATIENT)
Dept: FAMILY MEDICINE CLINIC | Facility: CLINIC | Age: 64
End: 2019-10-03

## 2019-10-03 ENCOUNTER — LAB (OUTPATIENT)
Dept: LAB | Facility: HOSPITAL | Age: 64
End: 2019-10-03

## 2019-10-03 DIAGNOSIS — I10 ESSENTIAL HYPERTENSION: ICD-10-CM

## 2019-10-03 DIAGNOSIS — Z23 IMMUNIZATION, PNEUMOCOCCUS AND INFLUENZA: ICD-10-CM

## 2019-10-03 DIAGNOSIS — E53.8 B12 DEFICIENCY: ICD-10-CM

## 2019-10-03 LAB
ALBUMIN SERPL-MCNC: 5.2 G/DL (ref 3.5–5.2)
ALBUMIN/GLOB SERPL: 2.1 G/DL
ALP SERPL-CCNC: 47 U/L (ref 39–117)
ALT SERPL W P-5'-P-CCNC: 56 U/L (ref 1–41)
ANION GAP SERPL CALCULATED.3IONS-SCNC: 14.1 MMOL/L (ref 5–15)
AST SERPL-CCNC: 37 U/L (ref 1–40)
BACTERIA UR QL AUTO: NORMAL /HPF
BILIRUB SERPL-MCNC: 0.6 MG/DL (ref 0.2–1.2)
BILIRUB UR QL STRIP: NEGATIVE
BUN BLD-MCNC: 18 MG/DL (ref 8–23)
BUN/CREAT SERPL: 15.1 (ref 7–25)
CALCIUM SPEC-SCNC: 10 MG/DL (ref 8.6–10.5)
CHLORIDE SERPL-SCNC: 102 MMOL/L (ref 98–107)
CHOLEST SERPL-MCNC: 215 MG/DL (ref 0–200)
CLARITY UR: CLEAR
CO2 SERPL-SCNC: 25.9 MMOL/L (ref 22–29)
COLOR UR: YELLOW
CREAT BLD-MCNC: 1.19 MG/DL (ref 0.76–1.27)
GFR SERPL CREATININE-BSD FRML MDRD: 61 ML/MIN/1.73
GLOBULIN UR ELPH-MCNC: 2.5 GM/DL
GLUCOSE BLD-MCNC: 101 MG/DL (ref 65–99)
GLUCOSE UR STRIP-MCNC: NEGATIVE MG/DL
HDLC SERPL-MCNC: 33 MG/DL (ref 40–60)
HGB UR QL STRIP.AUTO: NEGATIVE
HYALINE CASTS UR QL AUTO: NORMAL /LPF
KETONES UR QL STRIP: NEGATIVE
LDLC SERPL CALC-MCNC: 114 MG/DL (ref 0–100)
LDLC/HDLC SERPL: 3.45 {RATIO}
LEUKOCYTE ESTERASE UR QL STRIP.AUTO: NEGATIVE
NITRITE UR QL STRIP: NEGATIVE
PH UR STRIP.AUTO: 8 [PH] (ref 5–8)
POTASSIUM BLD-SCNC: 4.2 MMOL/L (ref 3.5–5.2)
PROT SERPL-MCNC: 7.7 G/DL (ref 6–8.5)
PROT UR QL STRIP: NEGATIVE
PSA SERPL-MCNC: 0.89 NG/ML (ref 0–4)
RBC # UR: NORMAL /HPF
REF LAB TEST METHOD: NORMAL
SODIUM BLD-SCNC: 142 MMOL/L (ref 136–145)
SP GR UR STRIP: 1.01 (ref 1–1.03)
SQUAMOUS #/AREA URNS HPF: NORMAL /HPF
TRIGL SERPL-MCNC: 341 MG/DL (ref 0–150)
UROBILINOGEN UR QL STRIP: NORMAL
VIT B12 BLD-MCNC: 417 PG/ML (ref 211–946)
VLDLC SERPL-MCNC: 68.2 MG/DL (ref 5–40)
WBC UR QL AUTO: NORMAL /HPF

## 2019-10-03 PROCEDURE — G0008 ADMIN INFLUENZA VIRUS VAC: HCPCS | Performed by: FAMILY MEDICINE

## 2019-10-03 PROCEDURE — 80053 COMPREHEN METABOLIC PANEL: CPT

## 2019-10-03 PROCEDURE — G0103 PSA SCREENING: HCPCS

## 2019-10-03 PROCEDURE — 36415 COLL VENOUS BLD VENIPUNCTURE: CPT

## 2019-10-03 PROCEDURE — 81001 URINALYSIS AUTO W/SCOPE: CPT

## 2019-10-03 PROCEDURE — 90653 IIV ADJUVANT VACCINE IM: CPT | Performed by: FAMILY MEDICINE

## 2019-10-03 PROCEDURE — 82607 VITAMIN B-12: CPT

## 2019-10-03 PROCEDURE — 80061 LIPID PANEL: CPT

## 2019-10-14 ENCOUNTER — OFFICE VISIT (OUTPATIENT)
Dept: FAMILY MEDICINE CLINIC | Facility: CLINIC | Age: 64
End: 2019-10-14

## 2019-10-14 VITALS
OXYGEN SATURATION: 98 % | BODY MASS INDEX: 27.94 KG/M2 | HEART RATE: 68 BPM | WEIGHT: 195.2 LBS | DIASTOLIC BLOOD PRESSURE: 70 MMHG | HEIGHT: 70 IN | SYSTOLIC BLOOD PRESSURE: 138 MMHG

## 2019-10-14 DIAGNOSIS — E53.8 B12 DEFICIENCY: ICD-10-CM

## 2019-10-14 DIAGNOSIS — Z23 NEED FOR PROPHYLACTIC VACCINATION AGAINST STREPTOCOCCUS PNEUMONIAE (PNEUMOCOCCUS): ICD-10-CM

## 2019-10-14 DIAGNOSIS — E78.2 MIXED HYPERLIPIDEMIA: Chronic | ICD-10-CM

## 2019-10-14 DIAGNOSIS — R06.00 DYSPNEA, UNSPECIFIED TYPE: ICD-10-CM

## 2019-10-14 DIAGNOSIS — G47.09 OTHER INSOMNIA: Chronic | ICD-10-CM

## 2019-10-14 DIAGNOSIS — I10 ESSENTIAL HYPERTENSION: Primary | Chronic | ICD-10-CM

## 2019-10-14 PROCEDURE — 99214 OFFICE O/P EST MOD 30 MIN: CPT | Performed by: GENERAL PRACTICE

## 2019-10-14 PROCEDURE — 90670 PCV13 VACCINE IM: CPT | Performed by: GENERAL PRACTICE

## 2019-10-14 PROCEDURE — G0009 ADMIN PNEUMOCOCCAL VACCINE: HCPCS | Performed by: GENERAL PRACTICE

## 2019-10-14 RX ORDER — TRAZODONE HYDROCHLORIDE 50 MG/1
50 TABLET ORAL NIGHTLY
COMMUNITY
End: 2020-05-13 | Stop reason: SDUPTHER

## 2019-10-14 NOTE — PROGRESS NOTES
Subjective   Ben Abebe is a 64 y.o. male.     Chief Complaint   Patient presents with   • Annual Exam     labs     For review and evaluation of management of chronic medical problems. Labs reviewed. Occasionally feels like he can't take a deep breath. Doesn't happen everyday.   Hypertension   This is a chronic problem. The current episode started more than 1 year ago. The problem is unchanged. The problem is uncontrolled. Pertinent negatives include no chest pain, headaches, neck pain, palpitations or shortness of breath. There are no associated agents to hypertension. Current antihypertension treatment includes beta blockers, angiotensin blockers, diuretics and calcium channel blockers. The current treatment provides significant improvement. There are no compliance problems.    Hyperlipidemia   This is a chronic problem. The current episode started more than 1 year ago. The problem is uncontrolled. Recent lipid tests were reviewed and are high. Pertinent negatives include no chest pain, myalgias or shortness of breath. Current antihyperlipidemic treatment includes fibric acid derivatives. The current treatment provides moderate improvement of lipids. There are no compliance problems.    Insomnia   This is a chronic problem. The current episode started more than 1 year ago. The problem occurs constantly. The problem has been unchanged. Pertinent negatives include no abdominal pain, arthralgias, chest pain, chills, congestion, coughing, fatigue, fever, headaches, joint swelling, myalgias, nausea, neck pain, numbness, rash, sore throat, vomiting or weakness. The symptoms are aggravated by stress. Treatments tried: trazodone. The treatment provided significant relief.      The following portions of the patient's history were reviewed and updated as appropriate: allergies, current medications, past family and social history and problem list.    Outpatient Medications Prior to Visit   Medication Sig Dispense  Refill   • Acetaminophen (TYLENOL EXTRA STRENGTH PO) Take 1 tablet by mouth As Needed.     • amitriptyline (ELAVIL) 10 MG tablet Take 1 tablet by mouth Every Night. 90 tablet 1   • amLODIPine (NORVASC) 10 MG tablet Take 1 tablet by mouth Daily. 90 tablet 3   • celecoxib (CeleBREX) 200 MG capsule Take 1 capsule by mouth As Needed for Mild Pain . 90 capsule 3   • cetirizine (zyrTEC) 10 MG tablet Take 1 tablet by mouth Daily. 30 tablet 0   • dicyclomine (BENTYL) 20 MG tablet Take 1 tablet by mouth 4 (Four) Times a Day As Needed (bowel problem). TAKE 1 q 6-8 HOURS AS NEEDED FOR BOWEL PROBLEM 90 tablet 1   • EPINEPHrine (EPIPEN) 0.3 MG/0.3ML solution auto-injector injection Use as directed 1 each 2   • fenofibrate (TRICOR) 145 MG tablet Take 1 tablet by mouth Daily. 90 tablet 2   • fluticasone (FLONASE) 50 MCG/ACT nasal spray 2 sprays into the nostril(s) as directed by provider Daily. 1 bottle 3   • folic acid (FOLVITE) 1 MG tablet Take 1 mg by mouth Daily.     • Garlic 1000 MG capsule Take 1 capsule by mouth Daily.     • irbesartan-hydrochlorothiazide (AVALIDE) 300-12.5 MG tablet Take 1 tablet by mouth Daily. 90 tablet 3   • magnesium gluconate (MAGONATE) 500 MG tablet Take 500 mg by mouth Daily.     • metoprolol succinate XL (TOPROL XL) 50 MG 24 hr tablet Take 1 tablet by mouth Daily. 90 tablet 3   • Multiple Vitamins-Minerals (CENTRUM SILVER ULTRA MENS PO) Take 1 tablet by mouth Daily.     • raNITIdine (ZANTAC) 150 MG tablet Take 150 mg by mouth Daily As Needed.     • tiZANidine (ZANAFLEX) 4 MG tablet 1-2 tabs tid prn 360 tablet 0   • traZODone (DESYREL) 50 MG tablet Take 50 mg by mouth Every Night. Takes 2 every night     • vitamin B-12 (CYANOCOBALAMIN) 2500 MCG sublingual tablet tablet Place 5,000 mcg under the tongue Daily.     • oxyCODONE-acetaminophen (PERCOCET)  MG per tablet Take 1 tablet by mouth Every 6 (Six) Hours As Needed for Moderate Pain . 28 tablet 0   • SUMAtriptan (IMITREX) 50 MG tablet Take 1  "tablet by mouth Every 2 (Two) Hours As Needed for Migraine. 9 tablet 2     No facility-administered medications prior to visit.        Review of Systems   Constitutional: Negative.  Negative for chills, fatigue, fever and unexpected weight change.   HENT: Negative.  Negative for congestion, ear pain, hearing loss, nosebleeds, rhinorrhea, sneezing, sore throat and tinnitus.    Eyes: Negative.  Negative for discharge.   Respiratory: Negative.  Negative for cough, shortness of breath and wheezing.    Cardiovascular: Negative.  Negative for chest pain and palpitations.   Gastrointestinal: Negative.  Negative for abdominal pain, constipation, diarrhea, nausea and vomiting.   Endocrine: Negative.    Genitourinary: Negative.  Negative for dysuria, frequency and urgency.   Musculoskeletal: Negative.  Negative for arthralgias, back pain, joint swelling, myalgias and neck pain.   Skin: Negative.  Negative for rash.   Allergic/Immunologic: Negative.    Neurological: Negative.  Negative for dizziness, weakness, numbness and headaches.   Hematological: Negative.  Negative for adenopathy.   Psychiatric/Behavioral: Negative for dysphoric mood and sleep disturbance. The patient has insomnia. The patient is not nervous/anxious.        Objective     Visit Vitals  /70   Pulse 68   Ht 177.8 cm (70\")   Wt 88.5 kg (195 lb 3.2 oz)   SpO2 98%   BMI 28.01 kg/m²     Physical Exam   Constitutional: He is oriented to person, place, and time. He appears well-developed and well-nourished. No distress.   HENT:   Head: Normocephalic and atraumatic.   Nose: Nose normal.   Mouth/Throat: Oropharynx is clear and moist.   Eyes: Conjunctivae and EOM are normal. Pupils are equal, round, and reactive to light. Right eye exhibits no discharge. Left eye exhibits no discharge.   Neck: Normal range of motion. No thyromegaly present.   Cardiovascular: Normal rate, regular rhythm, normal heart sounds and intact distal pulses.   No murmur " heard.  Pulmonary/Chest: Effort normal and breath sounds normal. No respiratory distress. He has no wheezes. He has no rales. He exhibits no tenderness.   Abdominal: Soft. Bowel sounds are normal. He exhibits no distension and no mass. There is no tenderness. No hernia.   Musculoskeletal: Normal range of motion. He exhibits no deformity.   Lymphadenopathy:     He has no cervical adenopathy.   Neurological: He is alert and oriented to person, place, and time. He has normal reflexes.   Skin: Skin is warm and dry. No rash noted. No pallor.   Psychiatric: He has a normal mood and affect. His behavior is normal. Judgment and thought content normal.     Results for orders placed or performed in visit on 10/03/19   Comprehensive Metabolic Panel   Result Value Ref Range    Glucose 101 (H) 65 - 99 mg/dL    BUN 18 8 - 23 mg/dL    Creatinine 1.19 0.76 - 1.27 mg/dL    Sodium 142 136 - 145 mmol/L    Potassium 4.2 3.5 - 5.2 mmol/L    Chloride 102 98 - 107 mmol/L    CO2 25.9 22.0 - 29.0 mmol/L    Calcium 10.0 8.6 - 10.5 mg/dL    Total Protein 7.7 6.0 - 8.5 g/dL    Albumin 5.20 3.50 - 5.20 g/dL    ALT (SGPT) 56 (H) 1 - 41 U/L    AST (SGOT) 37 1 - 40 U/L    Alkaline Phosphatase 47 39 - 117 U/L    Total Bilirubin 0.6 0.2 - 1.2 mg/dL    eGFR Non African Amer 61 >60 mL/min/1.73    Globulin 2.5 gm/dL    A/G Ratio 2.1 g/dL    BUN/Creatinine Ratio 15.1 7.0 - 25.0    Anion Gap 14.1 5.0 - 15.0 mmol/L   Lipid Panel   Result Value Ref Range    Total Cholesterol 215 (H) 0 - 200 mg/dL    Triglycerides 341 (H) 0 - 150 mg/dL    HDL Cholesterol 33 (L) 40 - 60 mg/dL    LDL Cholesterol  114 (H) 0 - 100 mg/dL    VLDL Cholesterol 68.2 (H) 5 - 40 mg/dL    LDL/HDL Ratio 3.45    PSA Screen   Result Value Ref Range    PSA 0.893 0.000 - 4.000 ng/mL   Vitamin B12   Result Value Ref Range    Vitamin B-12 417 211 - 946 pg/mL   Urinalysis without microscopic (no culture) - Urine, Clean Catch   Result Value Ref Range    Color, UA Yellow Yellow, Straw     Appearance, UA Clear Clear    pH, UA 8.0 5.0 - 8.0    Specific Gravity, UA 1.006 1.005 - 1.030    Glucose, UA Negative Negative    Ketones, UA Negative Negative    Bilirubin, UA Negative Negative    Blood, UA Negative Negative    Protein, UA Negative Negative    Leuk Esterase, UA Negative Negative    Nitrite, UA Negative Negative    Urobilinogen, UA 0.2 E.U./dL 0.2 - 1.0 E.U./dL   Urinalysis, Microscopic Only - Urine, Clean Catch   Result Value Ref Range    RBC, UA 0-2 None Seen, 0-2 /HPF    WBC, UA None Seen None Seen, 0-2 /HPF    Bacteria, UA None Seen None Seen /HPF    Squamous Epithelial Cells, UA 0-2 None Seen, 0-2 /HPF    Hyaline Casts, UA None Seen None Seen /LPF    Methodology Automated Microscopy       Assessment/Plan   Problem List Items Addressed This Visit        Cardiovascular and Mediastinum    Essential hypertension - Primary (Chronic)    Mixed hyperlipidemia       Digestive    B12 deficiency       Other    Insomnia (Chronic)      Other Visit Diagnoses     Dyspnea, unspecified type        Relevant Orders    XR Chest 2 View    Need for prophylactic vaccination against Streptococcus pneumoniae (pneumococcus)        Relevant Orders    Pneumococcal Conjugate Vaccine 13-Valent All (PCV13)         Will notify regarding results. Continue current treatment.     No orders of the defined types were placed in this encounter.    Return in about 6 months (around 4/14/2020) for Recheck, medicare wellness visit.

## 2019-10-23 ENCOUNTER — TELEPHONE (OUTPATIENT)
Dept: FAMILY MEDICINE CLINIC | Facility: CLINIC | Age: 64
End: 2019-10-23

## 2019-10-23 NOTE — TELEPHONE ENCOUNTER
Dr. Hernandez,    Please Advise.  I have let the patient know that you are out of office until Next week and will get back with him next week.    Thank you    NAJMA Bell    Regarding: Test Results Question  Contact: [phone]  ----- Message from [employer], Generic sent at 10/23/2019 12:44 PM EDT -----    A couple of days ago I received an email from [facility] saying I had a test results ready for review. The test results was of the chest x-ray I had a couple of weeks ago. If I understand the results of the x ray it is saying I have COPD. I am a little puzzled since I had never been told by you or any other doctor that I have COPD. Can you help me interpret the x-ray? Does it say that I have COPD and if so, is there anything I need to be doing?

## 2019-10-28 DIAGNOSIS — R06.00 DYSPNEA, UNSPECIFIED TYPE: Primary | ICD-10-CM

## 2019-10-28 DIAGNOSIS — R93.89 ABNORMAL CXR: ICD-10-CM

## 2019-11-04 ENCOUNTER — HOSPITAL ENCOUNTER (OUTPATIENT)
Dept: PULMONOLOGY | Facility: HOSPITAL | Age: 64
Discharge: HOME | End: 2019-11-04
Admitting: GENERAL PRACTICE

## 2019-11-04 DIAGNOSIS — R93.89 ABNORMAL CXR: ICD-10-CM

## 2019-11-04 DIAGNOSIS — R06.00 DYSPNEA, UNSPECIFIED TYPE: ICD-10-CM

## 2019-11-04 PROCEDURE — 94010 BREATHING CAPACITY TEST: CPT | Performed by: INTERNAL MEDICINE

## 2019-11-04 PROCEDURE — 94010 BREATHING CAPACITY TEST: CPT

## 2019-11-05 ENCOUNTER — TELEPHONE (OUTPATIENT)
Dept: FAMILY MEDICINE CLINIC | Facility: CLINIC | Age: 64
End: 2019-11-05

## 2019-11-05 NOTE — TELEPHONE ENCOUNTER
Per Dr. Hernandez, Mr. Abebe has been called with recent Pulmonary Function Study Test results & recommendations.  Continue current medications and follow-up as planned or sooner if any problems.      ----- Message from Melvi Hernandez MD sent at 11/5/2019 12:31 PM CST -----  Call and tell normal, no evidence of COPD

## 2019-11-05 NOTE — PROGRESS NOTES
Per Dr. Hernandez, Mr. Abebe has been called with recent Pulmonary Function Study Test results & recommendations.  Continue current medications and follow-up as planned or sooner if any problems.

## 2019-12-23 RX ORDER — TIZANIDINE 4 MG/1
TABLET ORAL
Qty: 360 TABLET | Refills: 0 | Status: SHIPPED | OUTPATIENT
Start: 2019-12-23 | End: 2019-12-23 | Stop reason: SDUPTHER

## 2019-12-23 RX ORDER — TIZANIDINE 4 MG/1
TABLET ORAL
Qty: 360 TABLET | Refills: 0 | Status: SHIPPED | OUTPATIENT
Start: 2019-12-23 | End: 2020-07-27 | Stop reason: SDUPTHER

## 2020-02-17 RX ORDER — FENOFIBRATE 145 MG/1
TABLET, COATED ORAL
Qty: 90 TABLET | Refills: 0 | Status: SHIPPED | OUTPATIENT
Start: 2020-02-17 | End: 2020-05-18

## 2020-05-04 RX ORDER — AMLODIPINE BESYLATE 10 MG/1
TABLET ORAL
Qty: 90 TABLET | Refills: 0 | Status: SHIPPED | OUTPATIENT
Start: 2020-05-04 | End: 2020-08-03 | Stop reason: SDUPTHER

## 2020-05-13 RX ORDER — TRAZODONE HYDROCHLORIDE 50 MG/1
50 TABLET ORAL NIGHTLY
Qty: 30 TABLET | Refills: 0 | Status: SHIPPED | OUTPATIENT
Start: 2020-05-13 | End: 2020-06-02 | Stop reason: SDUPTHER

## 2020-05-13 RX ORDER — TRAZODONE HYDROCHLORIDE 50 MG/1
50 TABLET ORAL NIGHTLY
Qty: 30 TABLET | Refills: 0 | Status: SHIPPED | OUTPATIENT
Start: 2020-05-13 | End: 2020-05-13 | Stop reason: SDUPTHER

## 2020-05-18 RX ORDER — FENOFIBRATE 145 MG/1
TABLET, COATED ORAL
Qty: 90 TABLET | Refills: 1 | Status: SHIPPED | OUTPATIENT
Start: 2020-05-18 | End: 2020-11-19

## 2020-06-02 ENCOUNTER — OFFICE VISIT (OUTPATIENT)
Dept: FAMILY MEDICINE CLINIC | Facility: CLINIC | Age: 65
End: 2020-06-02

## 2020-06-02 VITALS
OXYGEN SATURATION: 98 % | SYSTOLIC BLOOD PRESSURE: 130 MMHG | WEIGHT: 197.4 LBS | BODY MASS INDEX: 28.26 KG/M2 | HEART RATE: 62 BPM | HEIGHT: 70 IN | DIASTOLIC BLOOD PRESSURE: 70 MMHG

## 2020-06-02 DIAGNOSIS — G47.09 OTHER INSOMNIA: Chronic | ICD-10-CM

## 2020-06-02 DIAGNOSIS — I10 ESSENTIAL HYPERTENSION: Chronic | ICD-10-CM

## 2020-06-02 DIAGNOSIS — Z00.00 MEDICARE ANNUAL WELLNESS VISIT, INITIAL: Primary | ICD-10-CM

## 2020-06-02 DIAGNOSIS — G62.9 PERIPHERAL POLYNEUROPATHY: Chronic | ICD-10-CM

## 2020-06-02 PROBLEM — Z79.01 LONG TERM CURRENT USE OF ANTICOAGULANT: Status: RESOLVED | Noted: 2017-09-14 | Resolved: 2020-06-02

## 2020-06-02 PROCEDURE — G0438 PPPS, INITIAL VISIT: HCPCS | Performed by: GENERAL PRACTICE

## 2020-06-02 PROCEDURE — 99213 OFFICE O/P EST LOW 20 MIN: CPT | Performed by: GENERAL PRACTICE

## 2020-06-02 RX ORDER — CELECOXIB 200 MG/1
200 CAPSULE ORAL AS NEEDED
Qty: 90 CAPSULE | Refills: 3 | Status: SHIPPED | OUTPATIENT
Start: 2020-06-02 | End: 2020-06-02 | Stop reason: SDUPTHER

## 2020-06-02 RX ORDER — TRAZODONE HYDROCHLORIDE 50 MG/1
TABLET ORAL
Qty: 180 TABLET | Refills: 1 | Status: SHIPPED | OUTPATIENT
Start: 2020-06-02 | End: 2020-06-02 | Stop reason: SDUPTHER

## 2020-06-02 RX ORDER — DULOXETIN HYDROCHLORIDE 30 MG/1
30 CAPSULE, DELAYED RELEASE ORAL DAILY
Qty: 30 CAPSULE | Refills: 1 | Status: SHIPPED | OUTPATIENT
Start: 2020-06-02 | End: 2020-06-22

## 2020-06-02 RX ORDER — CELECOXIB 200 MG/1
200 CAPSULE ORAL AS NEEDED
Qty: 90 CAPSULE | Refills: 3 | Status: SHIPPED | OUTPATIENT
Start: 2020-06-02 | End: 2021-06-21 | Stop reason: SDUPTHER

## 2020-06-02 RX ORDER — TRAZODONE HYDROCHLORIDE 50 MG/1
TABLET ORAL
Qty: 180 TABLET | Refills: 1 | Status: SHIPPED | OUTPATIENT
Start: 2020-06-02 | End: 2021-01-27 | Stop reason: SDUPTHER

## 2020-06-02 NOTE — PROGRESS NOTES
Subjective   Ben Abebe is a 65 y.o. male.   Chief Complaint   Patient presents with   • Medicare Wellness-subsequent   • Hyperlipidemia   • Hypertension   • Peripheral Neuropathy       For review and evaluation of management of chronic medical problems. Labs reviewed. Tolerating medications.  Peripheral neuropathy is not controlled. Wanting to try duloxetine as daughter takes it. Has some osteoarthritis and uses celebrex prn for pain with relief. Needs refills.   Hypertension   This is a chronic problem. The current episode started more than 1 year ago. The problem is unchanged. The problem is uncontrolled. There are no associated agents to hypertension. Current antihypertension treatment includes beta blockers, angiotensin blockers, diuretics and calcium channel blockers. The current treatment provides significant improvement. There are no compliance problems.    Insomnia   This is a chronic problem. The current episode started more than 1 year ago. The problem occurs constantly. The problem has been unchanged. Pertinent negatives include no arthralgias, chills, congestion, coughing, joint swelling, numbness, rash or sore throat. The symptoms are aggravated by stress. Treatments tried: trazodone. The treatment provided significant relief.      The following portions of the patient's history were reviewed and updated as appropriate: allergies, current medications, past social history and problem list.    Outpatient Medications Prior to Visit   Medication Sig Dispense Refill   • Acetaminophen (TYLENOL EXTRA STRENGTH PO) Take 1 tablet by mouth As Needed.     • amLODIPine (NORVASC) 10 MG tablet Take 1 tablet by mouth once daily 90 tablet 0   • cetirizine (zyrTEC) 10 MG tablet Take 1 tablet by mouth Daily. 30 tablet 0   • dicyclomine (BENTYL) 20 MG tablet Take 1 tablet by mouth 4 (Four) Times a Day As Needed (bowel problem). TAKE 1 q 6-8 HOURS AS NEEDED FOR BOWEL PROBLEM 90 tablet 1   • EPINEPHrine (EPIPEN)  0.3 MG/0.3ML solution auto-injector injection Use as directed 1 each 2   • fenofibrate (TRICOR) 145 MG tablet Take 1 tablet by mouth once daily 90 tablet 1   • fluticasone (FLONASE) 50 MCG/ACT nasal spray 2 sprays into the nostril(s) as directed by provider Daily. 1 bottle 3   • folic acid (FOLVITE) 1 MG tablet Take 1 mg by mouth Daily.     • Garlic 1000 MG capsule Take 1 capsule by mouth Daily.     • irbesartan-hydrochlorothiazide (AVALIDE) 300-12.5 MG tablet Take 1 tablet by mouth Daily. 90 tablet 3   • magnesium gluconate (MAGONATE) 500 MG tablet Take 500 mg by mouth Daily.     • metoprolol succinate XL (TOPROL XL) 50 MG 24 hr tablet Take 1 tablet by mouth Daily. 90 tablet 3   • Multiple Vitamins-Minerals (CENTRUM SILVER ULTRA MENS PO) Take 1 tablet by mouth Daily.     • tiZANidine (ZANAFLEX) 4 MG tablet 1-2 tabs tid prn 360 tablet 0   • vitamin B-12 (CYANOCOBALAMIN) 2500 MCG sublingual tablet tablet Place 5,000 mcg under the tongue Daily.     • amitriptyline (ELAVIL) 10 MG tablet Take 1 tablet by mouth Every Night. 90 tablet 1   • celecoxib (CeleBREX) 200 MG capsule Take 1 capsule by mouth As Needed for Mild Pain . 90 capsule 3   • raNITIdine (ZANTAC) 150 MG tablet Take 150 mg by mouth Daily As Needed.     • traZODone (DESYREL) 50 MG tablet Take 1 tablet by mouth Every Night. Takes 2 every night 30 tablet 0     No facility-administered medications prior to visit.        Review of Systems   Constitutional: Negative.  Negative for chills and unexpected weight change.   HENT: Negative.  Negative for congestion, ear pain, hearing loss, nosebleeds, rhinorrhea, sneezing, sore throat and tinnitus.    Eyes: Negative.  Negative for discharge.   Respiratory: Negative.  Negative for cough and wheezing.    Cardiovascular: Negative.    Gastrointestinal: Negative.  Negative for constipation and diarrhea.   Endocrine: Negative.    Genitourinary: Negative.  Negative for dysuria, frequency and urgency.   Musculoskeletal:  "Negative.  Negative for arthralgias and joint swelling.   Skin: Negative.  Negative for rash.   Allergic/Immunologic: Negative.    Neurological: Negative.  Negative for numbness.   Hematological: Negative.  Negative for adenopathy.   Psychiatric/Behavioral: Negative for dysphoric mood and sleep disturbance. The patient has insomnia. The patient is not nervous/anxious.      Objective   Visit Vitals  /70 (BP Location: Left arm)   Pulse 62   Ht 177.8 cm (70\")   Wt 89.5 kg (197 lb 6.4 oz)   SpO2 98%   BMI 28.32 kg/m²     Physical Exam   Constitutional: He is oriented to person, place, and time. He appears well-developed and well-nourished. No distress.   HENT:   Head: Normocephalic.   Nose: Nose normal.   Mouth/Throat: Oropharynx is clear and moist.   Eyes: Pupils are equal, round, and reactive to light. Conjunctivae and EOM are normal. Right eye exhibits no discharge. Left eye exhibits no discharge.   Neck: No thyromegaly present.   Cardiovascular: Normal rate, regular rhythm, normal heart sounds and intact distal pulses.   No murmur heard.  Pulmonary/Chest: Effort normal and breath sounds normal.   Musculoskeletal: He exhibits no edema.   Lymphadenopathy:     He has no cervical adenopathy.   Neurological: He is alert and oriented to person, place, and time.   Skin: Skin is warm and dry.   Psychiatric: He has a normal mood and affect.   Nursing note and vitals reviewed.    Notes brought forward are reviewed and updated if indicated.     Assessment/Plan   Problem List Items Addressed This Visit        Cardiovascular and Mediastinum    Essential hypertension (Chronic)       Nervous and Auditory    Peripheral neuropathy (Chronic)       Other    Insomnia (Chronic)      Other Visit Diagnoses     Medicare annual wellness visit, initial    -  Primary         Continue current treatment. Try duloxetine.    New Medications Ordered This Visit   Medications   • DULoxetine (CYMBALTA) 30 MG capsule     Sig: Take 1 capsule by " mouth Daily.     Dispense:  30 capsule     Refill:  1   • traZODone (DESYREL) 50 MG tablet     Si - 2 tabs qhs prn     Dispense:  180 tablet     Refill:  1   • celecoxib (CeleBREX) 200 MG capsule     Sig: Take 1 capsule by mouth As Needed for Mild Pain  or Moderate Pain .     Dispense:  90 capsule     Refill:  3     Return in about 2 months (around 2020) for Recheck.

## 2020-06-02 NOTE — PROGRESS NOTES
The ABCs of the Annual Wellness Visit  Subsequent Medicare Wellness Visit    Chief Complaint   Patient presents with   • Medicare Wellness-subsequent   • Hyperlipidemia   • Hypertension       Subjective   History of Present Illness:  Ben Abebe is a 65 y.o. male who presents for a Subsequent Medicare Wellness Visit.    HEALTH RISK ASSESSMENT    Recent Hospitalizations:  No hospitalization(s) within the last year.    Current Medical Providers:  Patient Care Team:  Melvi Hernandez MD as PCP - General  Melvi Hernandez MD as PCP - Claims Attributed    Smoking Status:  Social History     Tobacco Use   Smoking Status Never Smoker   Smokeless Tobacco Never Used       Alcohol Consumption:  Social History     Substance and Sexual Activity   Alcohol Use Yes       Depression Screen:   PHQ-2/PHQ-9 Depression Screening 6/2/2020   Little interest or pleasure in doing things 0   Feeling down, depressed, or hopeless 0   Trouble falling or staying asleep, or sleeping too much 3   Feeling tired or having little energy 0   Poor appetite or overeating 0   Feeling bad about yourself - or that you are a failure or have let yourself or your family down 0   Trouble concentrating on things, such as reading the newspaper or watching television 0   Moving or speaking so slowly that other people could have noticed. Or the opposite - being so fidgety or restless that you have been moving around a lot more than usual 0   Thoughts that you would be better off dead, or of hurting yourself in some way 0   Total Score 3   If you checked off any problems, how difficult have these problems made it for you to do your work, take care of things at home, or get along with other people? Not difficult at all       Fall Risk Screen:  STEADI Fall Risk Assessment has not been completed.    Health Habits and Functional and Cognitive Screening:  Functional & Cognitive Status 6/2/2020   Do you have difficulty preparing food and eating? No   Do you  have difficulty bathing yourself, getting dressed or grooming yourself? No   Do you have difficulty using the toilet? No   Do you have difficulty moving around from place to place? No   Do you have trouble with steps or getting out of a bed or a chair? No   Current Diet Well Balanced Diet   Dental Exam Up to date   Eye Exam Not up to date   Exercise (times per week) 0 times per week   Current Exercise Activities Include -   Do you need help using the phone?  No   Are you deaf or do you have serious difficulty hearing?  No   Do you need help with transportation? No   Do you need help shopping? No   Do you need help preparing meals?  No   Do you need help with housework?  No   Do you need help with laundry? No   Do you need help taking your medications? No   Do you need help managing money? No   Do you ever drive or ride in a car without wearing a seat belt? No   Have you felt unusual stress, anger or loneliness in the last month? No   Who do you live with? Spouse   If you need help, do you have trouble finding someone available to you? No   Have you been bothered in the last four weeks by sexual problems? No   Do you have difficulty concentrating, remembering or making decisions? No         Does the patient have evidence of cognitive impairment? No    Asprin use counseling:Taking ASA appropriately as indicated    Age-appropriate Screening Schedule:  Refer to the list below for future screening recommendations based on patient's age, sex and/or medical conditions. Orders for these recommended tests are listed in the plan section. The patient has been provided with a written plan.    Health Maintenance   Topic Date Due   • INFLUENZA VACCINE  08/01/2020   • LIPID PANEL  10/03/2020   • COLONOSCOPY  02/22/2021   • TDAP/TD VACCINES (2 - Td) 10/11/2026   • ZOSTER VACCINE  Completed          The following portions of the patient's history were reviewed and updated as appropriate: allergies, current medications, past family  history, past medical history, past social history, past surgical history and problem list.    Outpatient Medications Prior to Visit   Medication Sig Dispense Refill   • Acetaminophen (TYLENOL EXTRA STRENGTH PO) Take 1 tablet by mouth As Needed.     • amitriptyline (ELAVIL) 10 MG tablet Take 1 tablet by mouth Every Night. 90 tablet 1   • amLODIPine (NORVASC) 10 MG tablet Take 1 tablet by mouth once daily 90 tablet 0   • celecoxib (CeleBREX) 200 MG capsule Take 1 capsule by mouth As Needed for Mild Pain . 90 capsule 3   • cetirizine (zyrTEC) 10 MG tablet Take 1 tablet by mouth Daily. 30 tablet 0   • dicyclomine (BENTYL) 20 MG tablet Take 1 tablet by mouth 4 (Four) Times a Day As Needed (bowel problem). TAKE 1 q 6-8 HOURS AS NEEDED FOR BOWEL PROBLEM 90 tablet 1   • EPINEPHrine (EPIPEN) 0.3 MG/0.3ML solution auto-injector injection Use as directed 1 each 2   • fenofibrate (TRICOR) 145 MG tablet Take 1 tablet by mouth once daily 90 tablet 1   • fluticasone (FLONASE) 50 MCG/ACT nasal spray 2 sprays into the nostril(s) as directed by provider Daily. 1 bottle 3   • folic acid (FOLVITE) 1 MG tablet Take 1 mg by mouth Daily.     • Garlic 1000 MG capsule Take 1 capsule by mouth Daily.     • irbesartan-hydrochlorothiazide (AVALIDE) 300-12.5 MG tablet Take 1 tablet by mouth Daily. 90 tablet 3   • magnesium gluconate (MAGONATE) 500 MG tablet Take 500 mg by mouth Daily.     • metoprolol succinate XL (TOPROL XL) 50 MG 24 hr tablet Take 1 tablet by mouth Daily. 90 tablet 3   • Multiple Vitamins-Minerals (CENTRUM SILVER ULTRA MENS PO) Take 1 tablet by mouth Daily.     • raNITIdine (ZANTAC) 150 MG tablet Take 150 mg by mouth Daily As Needed.     • tiZANidine (ZANAFLEX) 4 MG tablet 1-2 tabs tid prn 360 tablet 0   • traZODone (DESYREL) 50 MG tablet Take 1 tablet by mouth Every Night. Takes 2 every night 30 tablet 0   • vitamin B-12 (CYANOCOBALAMIN) 2500 MCG sublingual tablet tablet Place 5,000 mcg under the tongue Daily.       No  "facility-administered medications prior to visit.        Patient Active Problem List   Diagnosis   • Insomnia   • Mixed hyperlipidemia   • B12 deficiency   • IBS (irritable bowel syndrome)   • Peripheral neuropathy   • RLS (restless legs syndrome)   • Anxiety   • Depression   • Essential hypertension   • Long term current use of anticoagulant   • Degenerative disc disease, lumbar       Advanced Care Planning:  ACP discussion was held with the patient during this visit. Patient does not have an advance directive, information provided.    Review of Systems    Compared to one year ago, the patient feels his physical health is the same.  Compared to one year ago, the patient feels his mental health is the same.    Reviewed chart for potential of high risk medication in the elderly: yes  Reviewed chart for potential of harmful drug interactions in the elderly:not applicable    Objective         Vitals:    06/02/20 0925   BP: 130/70   BP Location: Left arm   Pulse: 62   SpO2: 98%   Weight: 89.5 kg (197 lb 6.4 oz)   Height: 177.8 cm (70\")   PainSc: 0-No pain       Body mass index is 28.32 kg/m².  Discussed the patient's BMI with him. The BMI is above average; BMI management plan is completed.    Physical Exam          Assessment/Plan   Medicare Risks and Personalized Health Plan  CMS Preventative Services Quick Reference  Advance Directive Discussion  Fall Risk  Obesity/Overweight     The above risks/problems have been discussed with the patient.  Pertinent information has been shared with the patient in the After Visit Summary.  Follow up plans and orders are seen below in the Assessment/Plan Section.    There are no diagnoses linked to this encounter.  Follow Up:  No follow-ups on file.     An After Visit Summary and PPPS were given to the patient.    Information has been scanned into chart. Discussed importance of taking medications as prescribed. Encouraged healthy eating habits with low fat, low salt choices and " working towards maintaining a healthy weight. Recommended regular exercise if able as well as care to prevent falls including avoiding anything on the floor that they could slip or trip on such as throw rugs, making sure they have a bathmat to step onto when their feet are wet and having grab bars and railings where needed.

## 2020-06-02 NOTE — PATIENT INSTRUCTIONS
Medicare Wellness  Personal Prevention Plan of Service     Date of Office Visit:  2020  Encounter Provider:  Melvi Hernandez MD  Place of Service:  [facility]  Patient Name: Ben Abebe  :  1955    As part of the Medicare Wellness portion of your visit today, we are providing you with this personalized preventive plan of services (PPPS). This plan is based upon recommendations of the [place] Preventive Services Task Force (USPSTF) and the Advisory Committee on Immunization Practices (ACIP).    This lists the preventive care services that should be considered, and provides dates of when you are due. Items listed as completed are up-to-date and do not require any further intervention.    Health Maintenance   Topic Date Due   • Pneumococcal Vaccine Once at 65 Years Old  2019   • MEDICARE ANNUAL WELLNESS  2020   • INFLUENZA VACCINE  2020   • LIPID PANEL  10/03/2020   • COLONOSCOPY  2021   • TDAP/TD VACCINES (2 - Td) 10/11/2026   • HEPATITIS C SCREENING  Completed   • ZOSTER VACCINE  Completed       No orders of the defined types were placed in this encounter.      Return in about 2 months (around 2020) for Recheck.

## 2020-06-02 NOTE — PROGRESS NOTES
The ABCs of the Annual Wellness Visit  Initial Medicare Wellness Visit    Chief Complaint   Patient presents with   • Medicare Wellness-subsequent   • Hyperlipidemia   • Hypertension   • Peripheral Neuropathy       Subjective   History of Present Illness:  Ben Abebe is a 65 y.o. male who presents for an Initial Medicare Wellness Visit.    HEALTH RISK ASSESSMENT    Recent Hospitalizations:  No hospitalization(s) within the last year.    Current Medical Providers:  Patient Care Team:  Melvi Hernandez MD as PCP - General  Melvi Hernandez MD as PCP - Claims Attributed    Smoking Status:  Social History     Tobacco Use   Smoking Status Never Smoker   Smokeless Tobacco Never Used       Alcohol Consumption:  Social History     Substance and Sexual Activity   Alcohol Use Yes       Depression Screen:   PHQ-2/PHQ-9 Depression Screening 6/2/2020   Little interest or pleasure in doing things 0   Feeling down, depressed, or hopeless 0   Trouble falling or staying asleep, or sleeping too much 3   Feeling tired or having little energy 0   Poor appetite or overeating 0   Feeling bad about yourself - or that you are a failure or have let yourself or your family down 0   Trouble concentrating on things, such as reading the newspaper or watching television 0   Moving or speaking so slowly that other people could have noticed. Or the opposite - being so fidgety or restless that you have been moving around a lot more than usual 0   Thoughts that you would be better off dead, or of hurting yourself in some way 0   Total Score 3   If you checked off any problems, how difficult have these problems made it for you to do your work, take care of things at home, or get along with other people? Not difficult at all       Fall Risk Screen:  STEADI Fall Risk Assessment was completed, and patient is at LOW risk for falls.Assessment completed on:6/2/2020    Health Habits and Functional and Cognitive Screening:  Functional &  Cognitive Status 6/2/2020   Do you have difficulty preparing food and eating? No   Do you have difficulty bathing yourself, getting dressed or grooming yourself? No   Do you have difficulty using the toilet? No   Do you have difficulty moving around from place to place? No   Do you have trouble with steps or getting out of a bed or a chair? No   Current Diet Well Balanced Diet   Dental Exam Up to date   Eye Exam Not up to date   Exercise (times per week) 0 times per week   Current Exercise Activities Include -   Do you need help using the phone?  No   Are you deaf or do you have serious difficulty hearing?  No   Do you need help with transportation? No   Do you need help shopping? No   Do you need help preparing meals?  No   Do you need help with housework?  No   Do you need help with laundry? No   Do you need help taking your medications? No   Do you need help managing money? No   Do you ever drive or ride in a car without wearing a seat belt? No   Have you felt unusual stress, anger or loneliness in the last month? No   Who do you live with? Spouse   If you need help, do you have trouble finding someone available to you? No   Have you been bothered in the last four weeks by sexual problems? No   Do you have difficulty concentrating, remembering or making decisions? No         Does the patient have evidence of cognitive impairment? No    Asprin use counseling:Taking ASA appropriately as indicated    Age-appropriate Screening Schedule:  Refer to the list below for future screening recommendations based on patient's age, sex and/or medical conditions. Orders for these recommended tests are listed in the plan section. The patient has been provided with a written plan.    Health Maintenance   Topic Date Due   • INFLUENZA VACCINE  08/01/2020   • LIPID PANEL  10/03/2020   • COLONOSCOPY  02/22/2021   • TDAP/TD VACCINES (2 - Td) 10/11/2026   • ZOSTER VACCINE  Completed          The following portions of the patient's  history were reviewed and updated as appropriate: allergies, current medications, past family history, past medical history, past social history, past surgical history and problem list.    Outpatient Medications Prior to Visit   Medication Sig Dispense Refill   • Acetaminophen (TYLENOL EXTRA STRENGTH PO) Take 1 tablet by mouth As Needed.     • amLODIPine (NORVASC) 10 MG tablet Take 1 tablet by mouth once daily 90 tablet 0   • cetirizine (zyrTEC) 10 MG tablet Take 1 tablet by mouth Daily. 30 tablet 0   • dicyclomine (BENTYL) 20 MG tablet Take 1 tablet by mouth 4 (Four) Times a Day As Needed (bowel problem). TAKE 1 q 6-8 HOURS AS NEEDED FOR BOWEL PROBLEM 90 tablet 1   • EPINEPHrine (EPIPEN) 0.3 MG/0.3ML solution auto-injector injection Use as directed 1 each 2   • fenofibrate (TRICOR) 145 MG tablet Take 1 tablet by mouth once daily 90 tablet 1   • fluticasone (FLONASE) 50 MCG/ACT nasal spray 2 sprays into the nostril(s) as directed by provider Daily. 1 bottle 3   • folic acid (FOLVITE) 1 MG tablet Take 1 mg by mouth Daily.     • Garlic 1000 MG capsule Take 1 capsule by mouth Daily.     • irbesartan-hydrochlorothiazide (AVALIDE) 300-12.5 MG tablet Take 1 tablet by mouth Daily. 90 tablet 3   • magnesium gluconate (MAGONATE) 500 MG tablet Take 500 mg by mouth Daily.     • metoprolol succinate XL (TOPROL XL) 50 MG 24 hr tablet Take 1 tablet by mouth Daily. 90 tablet 3   • Multiple Vitamins-Minerals (CENTRUM SILVER ULTRA MENS PO) Take 1 tablet by mouth Daily.     • tiZANidine (ZANAFLEX) 4 MG tablet 1-2 tabs tid prn 360 tablet 0   • vitamin B-12 (CYANOCOBALAMIN) 2500 MCG sublingual tablet tablet Place 5,000 mcg under the tongue Daily.     • amitriptyline (ELAVIL) 10 MG tablet Take 1 tablet by mouth Every Night. 90 tablet 1   • celecoxib (CeleBREX) 200 MG capsule Take 1 capsule by mouth As Needed for Mild Pain . 90 capsule 3   • raNITIdine (ZANTAC) 150 MG tablet Take 150 mg by mouth Daily As Needed.     • traZODone (DESYREL)  50 MG tablet Take 1 tablet by mouth Every Night. Takes 2 every night 30 tablet 0     No facility-administered medications prior to visit.        Patient Active Problem List   Diagnosis   • Insomnia   • Mixed hyperlipidemia   • B12 deficiency   • IBS (irritable bowel syndrome)   • Peripheral neuropathy   • RLS (restless legs syndrome)   • Anxiety   • Depression   • Essential hypertension   • Degenerative disc disease, lumbar       Advanced Care Planning:  ACP discussion was held with the patient during this visit. Patient does not have an advance directive, information provided.    Review of Systems   Constitutional: Negative.  Negative for chills, fatigue, fever and unexpected weight change.   HENT: Negative.  Negative for congestion, ear pain, hearing loss, nosebleeds, rhinorrhea, sneezing, sore throat and tinnitus.    Eyes: Negative.  Negative for discharge.   Respiratory: Negative.  Negative for cough, shortness of breath and wheezing.    Cardiovascular: Negative.  Negative for chest pain and palpitations.   Gastrointestinal: Negative.  Negative for abdominal pain, constipation, diarrhea, nausea and vomiting.   Endocrine: Negative.    Genitourinary: Negative.  Negative for dysuria, frequency and urgency.   Musculoskeletal: Negative.  Negative for arthralgias, back pain, joint swelling, myalgias and neck pain.   Skin: Negative.  Negative for rash.   Allergic/Immunologic: Negative.    Neurological: Negative.  Negative for dizziness, weakness, numbness and headaches.   Hematological: Negative.  Negative for adenopathy.   Psychiatric/Behavioral: Negative.  Negative for dysphoric mood and sleep disturbance. The patient is not nervous/anxious.        Compared to one year ago, the patient feels his physical health is the same.  Compared to one year ago, the patient feels his mental health is the same.    Reviewed chart for potential of high risk medication in the elderly: yes  Reviewed chart for potential of harmful  "drug interactions in the elderly:not applicable    Objective         Vitals:    06/02/20 0925   BP: 130/70   BP Location: Left arm   Pulse: 62   SpO2: 98%   Weight: 89.5 kg (197 lb 6.4 oz)   Height: 177.8 cm (70\")   PainSc: 0-No pain       Body mass index is 28.32 kg/m².  Discussed the patient's BMI with him. The BMI is above average; BMI management plan is completed.    Physical Exam   Constitutional: He is oriented to person, place, and time. He appears well-developed and well-nourished. No distress.   HENT:   Head: Normocephalic.   Nose: Nose normal.   Mouth/Throat: Oropharynx is clear and moist.   Eyes: Pupils are equal, round, and reactive to light. Conjunctivae and EOM are normal. Right eye exhibits no discharge. Left eye exhibits no discharge.   Neck: No thyromegaly present.   Cardiovascular: Normal rate, regular rhythm, normal heart sounds and intact distal pulses.   No murmur heard.  Pulmonary/Chest: Effort normal and breath sounds normal.   Musculoskeletal: He exhibits no edema.   Lymphadenopathy:     He has no cervical adenopathy.   Neurological: He is alert and oriented to person, place, and time.   Skin: Skin is warm and dry.   Psychiatric: He has a normal mood and affect.   Nursing note and vitals reviewed.    Assessment/Plan   Medicare Risks and Personalized Health Plan  CMS Preventative Services Quick Reference  Advance Directive Discussion  Fall Risk  Obesity/Overweight     The above risks/problems have been discussed with the patient.  Pertinent information has been shared with the patient in the After Visit Summary.  Follow up plans and orders are seen below in the Assessment/Plan Section.    Diagnoses and all orders for this visit:    1. Medicare annual wellness visit, initial (Primary)    2. Essential hypertension    3. Peripheral polyneuropathy    4. Other insomnia    Other orders  -     Discontinue: traZODone (DESYREL) 50 MG tablet; 1 - 2 tabs qhs prn  Dispense: 180 tablet; Refill: 1  -     " Discontinue: celecoxib (CeleBREX) 200 MG capsule; Take 1 capsule by mouth As Needed for Mild Pain  or Moderate Pain .  Dispense: 90 capsule; Refill: 3  -     DULoxetine (CYMBALTA) 30 MG capsule; Take 1 capsule by mouth Daily.  Dispense: 30 capsule; Refill: 1  -     traZODone (DESYREL) 50 MG tablet; 1 - 2 tabs qhs prn  Dispense: 180 tablet; Refill: 1  -     celecoxib (CeleBREX) 200 MG capsule; Take 1 capsule by mouth As Needed for Mild Pain  or Moderate Pain .  Dispense: 90 capsule; Refill: 3      Follow Up:  Return in about 2 months (around 8/2/2020) for Recheck.     An After Visit Summary and PPPS were given to the patient.    Information has been scanned into chart. Discussed importance of taking medications as prescribed. Encouraged healthy eating habits with low fat, low salt choices and working towards maintaining a healthy weight. Recommended regular exercise if able as well as care to prevent falls including avoiding anything on the floor that they could slip or trip on such as throw rugs, making sure they have a bathmat to step onto when their feet are wet and having grab bars and railings where needed.

## 2020-06-22 RX ORDER — DULOXETIN HYDROCHLORIDE 60 MG/1
60 CAPSULE, DELAYED RELEASE ORAL DAILY
Qty: 90 CAPSULE | Refills: 3 | Status: SHIPPED | OUTPATIENT
Start: 2020-06-22 | End: 2020-10-09

## 2020-07-06 RX ORDER — METOPROLOL SUCCINATE 50 MG/1
50 TABLET, EXTENDED RELEASE ORAL DAILY
Qty: 90 TABLET | Refills: 3 | Status: SHIPPED | OUTPATIENT
Start: 2020-07-06 | End: 2021-06-21 | Stop reason: SDUPTHER

## 2020-07-27 RX ORDER — TIZANIDINE 4 MG/1
TABLET ORAL
Qty: 360 TABLET | Refills: 0 | Status: SHIPPED | OUTPATIENT
Start: 2020-07-27 | End: 2021-03-25

## 2020-08-03 ENCOUNTER — OFFICE VISIT (OUTPATIENT)
Dept: FAMILY MEDICINE CLINIC | Facility: CLINIC | Age: 65
End: 2020-08-03

## 2020-08-03 VITALS
DIASTOLIC BLOOD PRESSURE: 60 MMHG | WEIGHT: 195.7 LBS | HEIGHT: 70 IN | SYSTOLIC BLOOD PRESSURE: 120 MMHG | HEART RATE: 61 BPM | BODY MASS INDEX: 28.02 KG/M2 | OXYGEN SATURATION: 99 %

## 2020-08-03 DIAGNOSIS — I10 ESSENTIAL HYPERTENSION: Chronic | ICD-10-CM

## 2020-08-03 DIAGNOSIS — G62.9 PERIPHERAL POLYNEUROPATHY: Primary | Chronic | ICD-10-CM

## 2020-08-03 DIAGNOSIS — Z12.5 ENCOUNTER FOR PROSTATE CANCER SCREENING: ICD-10-CM

## 2020-08-03 PROCEDURE — 99213 OFFICE O/P EST LOW 20 MIN: CPT | Performed by: GENERAL PRACTICE

## 2020-08-03 RX ORDER — AMLODIPINE BESYLATE 10 MG/1
10 TABLET ORAL DAILY
Qty: 90 TABLET | Refills: 3 | Status: SHIPPED | OUTPATIENT
Start: 2020-08-03 | End: 2021-06-21 | Stop reason: SDUPTHER

## 2020-08-03 NOTE — PROGRESS NOTES
Subjective   Ben Abebe is a 65 y.o. male.   Chief Complaint   Patient presents with   • Hypertension   • Peripheral Neuropathy       Started on duloxetine for neuropathy. Is helping. Has had a lot of energy and pain is much better. Has not had to take much celebrex as a result. Needs refill on amlodipine.  Hypertension   This is a chronic problem. The current episode started more than 1 year ago. The problem is unchanged. The problem is uncontrolled. There are no associated agents to hypertension. Current antihypertension treatment includes beta blockers, angiotensin blockers, diuretics and calcium channel blockers. The current treatment provides significant improvement. There are no compliance problems.       The following portions of the patient's history were reviewed and updated as appropriate: allergies, current medications, past social history and problem list.    Outpatient Medications Prior to Visit   Medication Sig Dispense Refill   • Acetaminophen (TYLENOL EXTRA STRENGTH PO) Take 1 tablet by mouth As Needed.     • celecoxib (CeleBREX) 200 MG capsule Take 1 capsule by mouth As Needed for Mild Pain  or Moderate Pain . 90 capsule 3   • cetirizine (zyrTEC) 10 MG tablet Take 1 tablet by mouth Daily. 30 tablet 0   • dicyclomine (BENTYL) 20 MG tablet Take 1 tablet by mouth 4 (Four) Times a Day As Needed (bowel problem). TAKE 1 q 6-8 HOURS AS NEEDED FOR BOWEL PROBLEM 90 tablet 1   • DULoxetine (CYMBALTA) 60 MG capsule Take 1 capsule by mouth Daily. 90 capsule 3   • EPINEPHrine (EPIPEN) 0.3 MG/0.3ML solution auto-injector injection Use as directed 1 each 2   • fenofibrate (TRICOR) 145 MG tablet Take 1 tablet by mouth once daily 90 tablet 1   • fluticasone (FLONASE) 50 MCG/ACT nasal spray 2 sprays into the nostril(s) as directed by provider Daily. 1 bottle 3   • folic acid (FOLVITE) 1 MG tablet Take 1 mg by mouth Daily.     • Garlic 1000 MG capsule Take 1 capsule by mouth Daily.     •  "irbesartan-hydrochlorothiazide (AVALIDE) 300-12.5 MG tablet Take 1 tablet by mouth Daily. 90 tablet 3   • magnesium gluconate (MAGONATE) 500 MG tablet Take 500 mg by mouth Daily.     • metoprolol succinate XL (Toprol XL) 50 MG 24 hr tablet Take 1 tablet by mouth Daily. 90 tablet 3   • Multiple Vitamins-Minerals (CENTRUM SILVER ULTRA MENS PO) Take 1 tablet by mouth Daily.     • tiZANidine (ZANAFLEX) 4 MG tablet 1-2 tabs tid prn 360 tablet 0   • traZODone (DESYREL) 50 MG tablet 1 - 2 tabs qhs prn 180 tablet 1   • vitamin B-12 (CYANOCOBALAMIN) 2500 MCG sublingual tablet tablet Place 5,000 mcg under the tongue Daily.     • amLODIPine (NORVASC) 10 MG tablet Take 1 tablet by mouth once daily 90 tablet 0     No facility-administered medications prior to visit.        Review of Systems   Constitutional: Negative.  Negative for unexpected weight change.   HENT: Negative.  Negative for ear pain, hearing loss, nosebleeds, rhinorrhea, sneezing and tinnitus.    Eyes: Negative.  Negative for discharge.   Respiratory: Negative.  Negative for wheezing.    Cardiovascular: Negative.    Gastrointestinal: Negative.  Negative for constipation and diarrhea.   Endocrine: Negative.    Genitourinary: Negative.  Negative for dysuria, frequency and urgency.   Musculoskeletal: Negative.  Negative for back pain.   Skin: Negative.    Allergic/Immunologic: Negative.    Neurological: Negative.  Negative for dizziness.   Hematological: Negative.  Negative for adenopathy.   Psychiatric/Behavioral: Negative for dysphoric mood and sleep disturbance. The patient is not nervous/anxious.        Objective   Visit Vitals  /60   Pulse 61   Ht 177.8 cm (70\")   Wt 88.8 kg (195 lb 11.2 oz)   SpO2 99%   BMI 28.08 kg/m²     Physical Exam   Constitutional: He is oriented to person, place, and time. He appears well-developed and well-nourished. No distress.   HENT:   Head: Normocephalic.   Nose: Nose normal.   Mouth/Throat: Oropharynx is clear and moist. "   Eyes: Pupils are equal, round, and reactive to light. Conjunctivae and EOM are normal. Right eye exhibits no discharge. Left eye exhibits no discharge.   Neck: No thyromegaly present.   Cardiovascular: Normal rate, regular rhythm, normal heart sounds and intact distal pulses.   No murmur heard.  Pulmonary/Chest: Effort normal and breath sounds normal.   Musculoskeletal: He exhibits no edema.   Lymphadenopathy:     He has no cervical adenopathy.   Neurological: He is alert and oriented to person, place, and time.   Skin: Skin is warm and dry.   Psychiatric: He has a normal mood and affect.   Nursing note and vitals reviewed.    Notes brought forward are reviewed and updated if indicated.     Assessment/Plan   Problem List Items Addressed This Visit        Cardiovascular and Mediastinum    Essential hypertension (Chronic)    Relevant Medications    amLODIPine (NORVASC) 10 MG tablet       Nervous and Auditory    Peripheral neuropathy - Primary (Chronic)          Continue current treatment.     New Medications Ordered This Visit   Medications   • amLODIPine (NORVASC) 10 MG tablet     Sig: Take 1 tablet by mouth Daily.     Dispense:  90 tablet     Refill:  3     Return in about 3 months (around 11/3/2020) for Annual physical.

## 2020-08-27 RX ORDER — IRBESARTAN AND HYDROCHLOROTHIAZIDE 300; 12.5 MG/1; MG/1
1 TABLET, FILM COATED ORAL DAILY
Qty: 90 TABLET | Refills: 3 | Status: SHIPPED | OUTPATIENT
Start: 2020-08-27 | End: 2021-06-21 | Stop reason: SDUPTHER

## 2020-09-14 ENCOUNTER — CLINICAL SUPPORT (OUTPATIENT)
Dept: AUDIOLOGY | Facility: CLINIC | Age: 65
End: 2020-09-14

## 2020-09-14 DIAGNOSIS — Z46.1 ENCOUNTER FOR FITTING OR ADJUSTMENT OF HEARING AID: Primary | ICD-10-CM

## 2020-09-14 PROCEDURE — HEARINGNOCHG: Performed by: AUDIOLOGIST

## 2020-09-14 NOTE — PROGRESS NOTES
HEARING AID CHECK    Name:  Ben Abebe  :  1955  Age:  65 y.o.  Date of Evaluation:  2020      HISTORY    Reason for visit:  Ben Abebe is seen today for a hearing aid warranty check.  Patient reports he has not worn the hearing aids much since his last adjustment 2 years ago.  He states background noise continues to be too loud.  He also states the large open domes currently on his hearing aids feel too big in his ears.      Hearing aid history:  Patient is currently wearing a  in the Canal (MONISHA) hearing aid in both ear(s).   Current 's warranty expires 2020.     OFFICE VISIT    During today's visit computer was used to decrease background noise and enhance speech sounds.  His domes were changed to medium open domes.     His warranty options were discussed with him, including purchasing an extended warranty at $190.00 per hearing aid per year with full coverage, or repairs out of warranty for $280.00 per repair.  He will think about the extended warranty and let me know prior to 2020 if he wants to extend this or not.        It was a pleasure seeing Ben Abebe in Audiology today.  It is a pleasure helping Mr. Abebe with his amplification needs.             This document has been electronically signed by Miranda Mckeon MS CCC-A on 2020 10:15 CDT       Miranda Mckeon MS CCC-A  Licensed Audiologist    For Billing and Codin  Hearing Aid Check, Binaural - no charge

## 2020-10-09 ENCOUNTER — OFFICE VISIT (OUTPATIENT)
Dept: FAMILY MEDICINE CLINIC | Facility: CLINIC | Age: 65
End: 2020-10-09

## 2020-10-09 VITALS
HEART RATE: 63 BPM | DIASTOLIC BLOOD PRESSURE: 72 MMHG | HEIGHT: 70 IN | BODY MASS INDEX: 27.63 KG/M2 | WEIGHT: 193 LBS | SYSTOLIC BLOOD PRESSURE: 130 MMHG | OXYGEN SATURATION: 99 %

## 2020-10-09 DIAGNOSIS — R53.83 OTHER FATIGUE: Primary | ICD-10-CM

## 2020-10-09 DIAGNOSIS — Z23 NEED FOR INFLUENZA VACCINATION: ICD-10-CM

## 2020-10-09 DIAGNOSIS — K58.0 IRRITABLE BOWEL SYNDROME WITH DIARRHEA: ICD-10-CM

## 2020-10-09 DIAGNOSIS — R42 LIGHTHEADEDNESS: ICD-10-CM

## 2020-10-09 PROCEDURE — 90686 IIV4 VACC NO PRSV 0.5 ML IM: CPT | Performed by: GENERAL PRACTICE

## 2020-10-09 PROCEDURE — 99213 OFFICE O/P EST LOW 20 MIN: CPT | Performed by: GENERAL PRACTICE

## 2020-10-09 PROCEDURE — G0008 ADMIN INFLUENZA VIRUS VAC: HCPCS | Performed by: GENERAL PRACTICE

## 2020-10-09 RX ORDER — DICYCLOMINE HCL 20 MG
20 TABLET ORAL 4 TIMES DAILY PRN
Qty: 60 TABLET | Refills: 1 | Status: SHIPPED | OUTPATIENT
Start: 2020-10-09 | End: 2022-12-14 | Stop reason: SDUPTHER

## 2020-10-09 NOTE — PROGRESS NOTES
Subjective   Ben Abebe is a 65 y.o. male.   Chief Complaint   Patient presents with   • Dizziness     History of Present Illness     Was having some diarrhea and thought it was related to the duloxetine so has weaned off that, stopped taking about 3-4 days ago. Has been having some wooziness and nausea.  Diarrhea is not unusual for him as he has had partial colectomy.  He is not feeling nearly as well as he did in the summer.  Is having some fatigue as well.    The following portions of the patient's history were reviewed and updated as appropriate: allergies, current medications, past social history and problem list.    Outpatient Medications Prior to Visit   Medication Sig Dispense Refill   • Acetaminophen (TYLENOL EXTRA STRENGTH PO) Take 1 tablet by mouth As Needed.     • amLODIPine (NORVASC) 10 MG tablet Take 1 tablet by mouth Daily. 90 tablet 3   • celecoxib (CeleBREX) 200 MG capsule Take 1 capsule by mouth As Needed for Mild Pain  or Moderate Pain . 90 capsule 3   • cetirizine (zyrTEC) 10 MG tablet Take 1 tablet by mouth Daily. 30 tablet 0   • EPINEPHrine (EPIPEN) 0.3 MG/0.3ML solution auto-injector injection Use as directed 1 each 2   • fenofibrate (TRICOR) 145 MG tablet Take 1 tablet by mouth once daily 90 tablet 1   • fluticasone (FLONASE) 50 MCG/ACT nasal spray 2 sprays into the nostril(s) as directed by provider Daily. 1 bottle 3   • folic acid (FOLVITE) 1 MG tablet Take 1 mg by mouth Daily.     • Garlic 1000 MG capsule Take 1 capsule by mouth Daily.     • irbesartan-hydrochlorothiazide (AVALIDE) 300-12.5 MG tablet Take 1 tablet by mouth Daily. 90 tablet 3   • magnesium gluconate (MAGONATE) 500 MG tablet Take 500 mg by mouth Daily.     • metoprolol succinate XL (Toprol XL) 50 MG 24 hr tablet Take 1 tablet by mouth Daily. 90 tablet 3   • Multiple Vitamins-Minerals (CENTRUM SILVER ULTRA MENS PO) Take 1 tablet by mouth Daily.     • tiZANidine (ZANAFLEX) 4 MG tablet 1-2 tabs tid prn 360 tablet 0  "  • traZODone (DESYREL) 50 MG tablet 1 - 2 tabs qhs prn 180 tablet 1   • vitamin B-12 (CYANOCOBALAMIN) 2500 MCG sublingual tablet tablet Place 5,000 mcg under the tongue Daily.     • dicyclomine (BENTYL) 20 MG tablet Take 1 tablet by mouth 4 (Four) Times a Day As Needed (bowel problem). TAKE 1 q 6-8 HOURS AS NEEDED FOR BOWEL PROBLEM 90 tablet 1   • DULoxetine (CYMBALTA) 60 MG capsule Take 1 capsule by mouth Daily. 90 capsule 3     No facility-administered medications prior to visit.        Review of Systems   Constitutional: Positive for fatigue. Negative for chills, fever and unexpected weight change.   HENT: Negative.  Negative for congestion, ear pain, hearing loss, nosebleeds, rhinorrhea, sneezing, sore throat and tinnitus.    Eyes: Negative.  Negative for discharge.   Respiratory: Negative.  Negative for cough, shortness of breath and wheezing.    Cardiovascular: Negative.  Negative for chest pain and palpitations.   Gastrointestinal: Positive for diarrhea and nausea. Negative for abdominal pain, constipation and vomiting.   Endocrine: Negative.    Genitourinary: Negative.  Negative for dysuria, frequency and urgency.   Musculoskeletal: Negative.  Negative for arthralgias, back pain, joint swelling, myalgias and neck pain.   Skin: Negative.  Negative for rash.   Allergic/Immunologic: Negative.    Neurological: Positive for light-headedness. Negative for dizziness, weakness, numbness and headaches.   Hematological: Negative.  Negative for adenopathy.   Psychiatric/Behavioral: Negative.  Negative for dysphoric mood and sleep disturbance. The patient is not nervous/anxious.        Objective   Visit Vitals  /72 (BP Location: Left arm)   Pulse 63   Ht 177.8 cm (70\")   Wt 87.5 kg (193 lb)   SpO2 99%   BMI 27.69 kg/m²     Physical Exam  Constitutional:       General: He is not in acute distress.     Appearance: He is well-developed.   HENT:      Head: Normocephalic and atraumatic.      Nose: Nose normal.   Eyes: "      General:         Right eye: No discharge.         Left eye: No discharge.      Conjunctiva/sclera: Conjunctivae normal.      Pupils: Pupils are equal, round, and reactive to light.   Neck:      Musculoskeletal: Normal range of motion.      Thyroid: No thyromegaly.   Cardiovascular:      Rate and Rhythm: Normal rate and regular rhythm.      Heart sounds: Normal heart sounds. No murmur.   Pulmonary:      Effort: Pulmonary effort is normal. No respiratory distress.      Breath sounds: Normal breath sounds. No wheezing or rales.   Chest:      Chest wall: No tenderness.   Abdominal:      General: Bowel sounds are normal. There is no distension.      Palpations: Abdomen is soft. There is no mass.      Tenderness: There is no abdominal tenderness.      Hernia: No hernia is present.   Musculoskeletal: Normal range of motion.         General: No deformity.   Lymphadenopathy:      Cervical: No cervical adenopathy.   Skin:     General: Skin is warm and dry.      Coloration: Skin is not pale.      Findings: No rash.   Neurological:      Mental Status: He is alert and oriented to person, place, and time.      Deep Tendon Reflexes: Reflexes are normal and symmetric.   Psychiatric:         Behavior: Behavior normal.         Thought Content: Thought content normal.         Judgment: Judgment normal.       Notes brought forward are reviewed and updated if indicated.     Assessment/Plan   Problem List Items Addressed This Visit        Digestive    IBS (irritable bowel syndrome)    Relevant Medications    dicyclomine (BENTYL) 20 MG tablet      Other Visit Diagnoses     Other fatigue    -  Primary    Relevant Orders    CBC & Differential    TSH    Vitamin B12    Need for influenza vaccination        Relevant Orders    Fluarix Quad >6 Months ([id number]) (Completed)    Lightheadedness             It is possible his lightheadedness and nausea is related to weaning off the duloxetine too quickly.  Advised to take 1 today and every 3  days for another 2 weeks. Will notify regarding results. Recheck if not improving.  Follow up as scheduled.    New Medications Ordered This Visit   Medications   • dicyclomine (BENTYL) 20 MG tablet     Sig: Take 1 tablet by mouth 4 (Four) Times a Day As Needed (bowel problem). TAKE 1 q 6-8 HOURS AS NEEDED FOR BOWEL PROBLEM     Dispense:  60 tablet     Refill:  1     Return if symptoms worsen or fail to improve, for Next scheduled follow up.

## 2020-10-12 ENCOUNTER — LAB (OUTPATIENT)
Dept: LAB | Facility: HOSPITAL | Age: 65
End: 2020-10-12

## 2020-10-12 DIAGNOSIS — Z12.5 ENCOUNTER FOR PROSTATE CANCER SCREENING: ICD-10-CM

## 2020-10-12 DIAGNOSIS — R53.83 OTHER FATIGUE: ICD-10-CM

## 2020-10-12 DIAGNOSIS — I10 ESSENTIAL HYPERTENSION: ICD-10-CM

## 2020-10-12 LAB
ALBUMIN SERPL-MCNC: 4.7 G/DL (ref 3.5–5.2)
ALBUMIN/GLOB SERPL: 1.7 G/DL
ALP SERPL-CCNC: 48 U/L (ref 39–117)
ALT SERPL W P-5'-P-CCNC: 57 U/L (ref 1–41)
ANION GAP SERPL CALCULATED.3IONS-SCNC: 11.2 MMOL/L (ref 5–15)
AST SERPL-CCNC: 43 U/L (ref 1–40)
BACTERIA UR QL AUTO: NORMAL /HPF
BASOPHILS # BLD AUTO: 0.03 10*3/MM3 (ref 0–0.2)
BASOPHILS NFR BLD AUTO: 0.7 % (ref 0–1.5)
BILIRUB SERPL-MCNC: 0.7 MG/DL (ref 0–1.2)
BILIRUB UR QL STRIP: NEGATIVE
BUN SERPL-MCNC: 23 MG/DL (ref 8–23)
BUN/CREAT SERPL: 18.4 (ref 7–25)
CALCIUM SPEC-SCNC: 10.1 MG/DL (ref 8.6–10.5)
CHLORIDE SERPL-SCNC: 102 MMOL/L (ref 98–107)
CHOLEST SERPL-MCNC: 203 MG/DL (ref 0–200)
CLARITY UR: CLEAR
CO2 SERPL-SCNC: 22.8 MMOL/L (ref 22–29)
COLOR UR: YELLOW
CREAT SERPL-MCNC: 1.25 MG/DL (ref 0.76–1.27)
DEPRECATED RDW RBC AUTO: 41.5 FL (ref 37–54)
EOSINOPHIL # BLD AUTO: 0.11 10*3/MM3 (ref 0–0.4)
EOSINOPHIL NFR BLD AUTO: 2.6 % (ref 0.3–6.2)
ERYTHROCYTE [DISTWIDTH] IN BLOOD BY AUTOMATED COUNT: 13 % (ref 12.3–15.4)
GFR SERPL CREATININE-BSD FRML MDRD: 58 ML/MIN/1.73
GLOBULIN UR ELPH-MCNC: 2.7 GM/DL
GLUCOSE SERPL-MCNC: 96 MG/DL (ref 65–99)
GLUCOSE UR STRIP-MCNC: NEGATIVE MG/DL
HCT VFR BLD AUTO: 41.3 % (ref 37.5–51)
HDLC SERPL-MCNC: 32 MG/DL (ref 40–60)
HGB BLD-MCNC: 14.6 G/DL (ref 13–17.7)
HGB UR QL STRIP.AUTO: NEGATIVE
HYALINE CASTS UR QL AUTO: NORMAL /LPF
IMM GRANULOCYTES # BLD AUTO: 0.02 10*3/MM3 (ref 0–0.05)
IMM GRANULOCYTES NFR BLD AUTO: 0.5 % (ref 0–0.5)
KETONES UR QL STRIP: NEGATIVE
LDLC SERPL CALC-MCNC: 102 MG/DL (ref 0–100)
LDLC/HDLC SERPL: 2.82 {RATIO}
LEUKOCYTE ESTERASE UR QL STRIP.AUTO: NEGATIVE
LYMPHOCYTES # BLD AUTO: 1.34 10*3/MM3 (ref 0.7–3.1)
LYMPHOCYTES NFR BLD AUTO: 31.9 % (ref 19.6–45.3)
MCH RBC QN AUTO: 31.1 PG (ref 26.6–33)
MCHC RBC AUTO-ENTMCNC: 35.4 G/DL (ref 31.5–35.7)
MCV RBC AUTO: 87.9 FL (ref 79–97)
MONOCYTES # BLD AUTO: 0.39 10*3/MM3 (ref 0.1–0.9)
MONOCYTES NFR BLD AUTO: 9.3 % (ref 5–12)
NEUTROPHILS NFR BLD AUTO: 2.31 10*3/MM3 (ref 1.7–7)
NEUTROPHILS NFR BLD AUTO: 55 % (ref 42.7–76)
NITRITE UR QL STRIP: NEGATIVE
NRBC BLD AUTO-RTO: 0 /100 WBC (ref 0–0.2)
PH UR STRIP.AUTO: 7.5 [PH] (ref 5–8)
PLATELET # BLD AUTO: 223 10*3/MM3 (ref 140–450)
PMV BLD AUTO: 11.1 FL (ref 6–12)
POTASSIUM SERPL-SCNC: 4 MMOL/L (ref 3.5–5.2)
PROT SERPL-MCNC: 7.4 G/DL (ref 6–8.5)
PROT UR QL STRIP: NEGATIVE
PSA SERPL-MCNC: 0.77 NG/ML (ref 0–4)
RBC # BLD AUTO: 4.7 10*6/MM3 (ref 4.14–5.8)
RBC # UR: NORMAL /HPF
REF LAB TEST METHOD: NORMAL
SODIUM SERPL-SCNC: 136 MMOL/L (ref 136–145)
SP GR UR STRIP: <=1.005 (ref 1–1.03)
SQUAMOUS #/AREA URNS HPF: NORMAL /HPF
TRIGL SERPL-MCNC: 404 MG/DL (ref 0–150)
TSH SERPL DL<=0.05 MIU/L-ACNC: 3.06 UIU/ML (ref 0.27–4.2)
UROBILINOGEN UR QL STRIP: NORMAL
VIT B12 BLD-MCNC: 435 PG/ML (ref 211–946)
VLDLC SERPL-MCNC: 69 MG/DL (ref 5–40)
WBC # BLD AUTO: 4.2 10*3/MM3 (ref 3.4–10.8)
WBC UR QL AUTO: NORMAL /HPF

## 2020-10-12 PROCEDURE — 84443 ASSAY THYROID STIM HORMONE: CPT

## 2020-10-12 PROCEDURE — 81001 URINALYSIS AUTO W/SCOPE: CPT

## 2020-10-12 PROCEDURE — 85025 COMPLETE CBC W/AUTO DIFF WBC: CPT

## 2020-10-12 PROCEDURE — G0103 PSA SCREENING: HCPCS

## 2020-10-12 PROCEDURE — 82607 VITAMIN B-12: CPT

## 2020-10-12 PROCEDURE — 80061 LIPID PANEL: CPT

## 2020-10-12 PROCEDURE — 36415 COLL VENOUS BLD VENIPUNCTURE: CPT

## 2020-10-12 PROCEDURE — 80053 COMPREHEN METABOLIC PANEL: CPT

## 2020-11-04 ENCOUNTER — OFFICE VISIT (OUTPATIENT)
Dept: FAMILY MEDICINE CLINIC | Facility: CLINIC | Age: 65
End: 2020-11-04

## 2020-11-04 VITALS
DIASTOLIC BLOOD PRESSURE: 64 MMHG | SYSTOLIC BLOOD PRESSURE: 130 MMHG | BODY MASS INDEX: 28.2 KG/M2 | WEIGHT: 197 LBS | HEIGHT: 70 IN | OXYGEN SATURATION: 98 % | HEART RATE: 60 BPM

## 2020-11-04 DIAGNOSIS — E78.2 MIXED HYPERLIPIDEMIA: Chronic | ICD-10-CM

## 2020-11-04 DIAGNOSIS — I10 ESSENTIAL HYPERTENSION: Primary | Chronic | ICD-10-CM

## 2020-11-04 DIAGNOSIS — G62.9 PERIPHERAL POLYNEUROPATHY: Chronic | ICD-10-CM

## 2020-11-04 DIAGNOSIS — R74.8 ELEVATED LIVER ENZYMES: ICD-10-CM

## 2020-11-04 PROCEDURE — 99214 OFFICE O/P EST MOD 30 MIN: CPT | Performed by: GENERAL PRACTICE

## 2020-11-04 RX ORDER — VENLAFAXINE HYDROCHLORIDE 150 MG/1
150 CAPSULE, EXTENDED RELEASE ORAL DAILY
COMMUNITY

## 2020-11-04 RX ORDER — FEXOFENADINE HCL 180 MG/1
180 TABLET ORAL DAILY
COMMUNITY

## 2020-11-04 NOTE — PROGRESS NOTES
Subjective   Ben Abebe is a 65 y.o. male.     Chief Complaint   Patient presents with   • Annual Exam   • Hypertension   • Hyperlipidemia     For review and evaluation of management of chronic medical problems. Labs reviewed. Has weaned off his duloxetine. Did see his neurologist and he suggested trying venlafaxine.  Hypertension  This is a chronic problem. The current episode started more than 1 year ago. The problem is unchanged. The problem is uncontrolled. Pertinent negatives include no chest pain, headaches, neck pain, palpitations or shortness of breath. There are no associated agents to hypertension. Current antihypertension treatment includes beta blockers, angiotensin blockers, diuretics and calcium channel blockers. The current treatment provides significant improvement. There are no compliance problems.    Hyperlipidemia  This is a chronic problem. The current episode started more than 1 year ago. The problem is uncontrolled. Recent lipid tests were reviewed and are high. Pertinent negatives include no chest pain, myalgias or shortness of breath. Current antihyperlipidemic treatment includes fibric acid derivatives. The current treatment provides moderate improvement of lipids. There are no compliance problems.    Insomnia  This is a chronic problem. The current episode started more than 1 year ago. The problem occurs constantly. The problem has been unchanged. Associated symptoms include numbness. Pertinent negatives include no abdominal pain, arthralgias, chest pain, chills, congestion, coughing, fatigue, fever, headaches, joint swelling, myalgias, nausea, neck pain, rash, sore throat, vomiting or weakness. The symptoms are aggravated by stress. Treatments tried: trazodone. The treatment provided significant relief.      The following portions of the patient's history were reviewed and updated as appropriate: allergies, current medications, past family and social history and problem  list.    Outpatient Medications Prior to Visit   Medication Sig Dispense Refill   • Acetaminophen (TYLENOL EXTRA STRENGTH PO) Take 1 tablet by mouth As Needed.     • amLODIPine (NORVASC) 10 MG tablet Take 1 tablet by mouth Daily. 90 tablet 3   • celecoxib (CeleBREX) 200 MG capsule Take 1 capsule by mouth As Needed for Mild Pain  or Moderate Pain . 90 capsule 3   • dicyclomine (BENTYL) 20 MG tablet Take 1 tablet by mouth 4 (Four) Times a Day As Needed (bowel problem). TAKE 1 q 6-8 HOURS AS NEEDED FOR BOWEL PROBLEM 60 tablet 1   • EPINEPHrine (EPIPEN) 0.3 MG/0.3ML solution auto-injector injection Use as directed 1 each 2   • fenofibrate (TRICOR) 145 MG tablet Take 1 tablet by mouth once daily 90 tablet 1   • fexofenadine (ALLEGRA) 180 MG tablet Take 180 mg by mouth Daily.     • fluticasone (FLONASE) 50 MCG/ACT nasal spray 2 sprays into the nostril(s) as directed by provider Daily. 1 bottle 3   • folic acid (FOLVITE) 1 MG tablet Take 1 mg by mouth Daily.     • Garlic 1000 MG capsule Take 1 capsule by mouth Daily.     • irbesartan-hydrochlorothiazide (AVALIDE) 300-12.5 MG tablet Take 1 tablet by mouth Daily. 90 tablet 3   • magnesium gluconate (MAGONATE) 500 MG tablet Take 500 mg by mouth Daily.     • metoprolol succinate XL (Toprol XL) 50 MG 24 hr tablet Take 1 tablet by mouth Daily. 90 tablet 3   • Multiple Vitamins-Minerals (CENTRUM SILVER ULTRA MENS PO) Take 1 tablet by mouth Daily.     • tiZANidine (ZANAFLEX) 4 MG tablet 1-2 tabs tid prn 360 tablet 0   • traZODone (DESYREL) 50 MG tablet 1 - 2 tabs qhs prn 180 tablet 1   • venlafaxine XR (EFFEXOR-XR) 150 MG 24 hr capsule Take 150 mg by mouth Daily.     • vitamin B-12 (CYANOCOBALAMIN) 2500 MCG sublingual tablet tablet Place 5,000 mcg under the tongue Daily.     • cetirizine (zyrTEC) 10 MG tablet Take 1 tablet by mouth Daily. 30 tablet 0     No facility-administered medications prior to visit.        Review of Systems   Constitutional: Negative.  Negative for chills,  "fatigue, fever and unexpected weight change.   HENT: Negative.  Negative for congestion, ear pain, hearing loss, nosebleeds, rhinorrhea, sneezing, sore throat and tinnitus.    Eyes: Negative.  Negative for discharge.   Respiratory: Negative.  Negative for cough, shortness of breath and wheezing.    Cardiovascular: Negative.  Negative for chest pain and palpitations.   Gastrointestinal: Negative.  Negative for abdominal pain, constipation, diarrhea, nausea and vomiting.   Endocrine: Negative.    Genitourinary: Negative.  Negative for dysuria, frequency and urgency.   Musculoskeletal: Negative.  Negative for arthralgias, back pain, joint swelling, myalgias and neck pain.   Skin: Negative.  Negative for rash.   Allergic/Immunologic: Negative.    Neurological: Positive for numbness. Negative for dizziness, weakness and headaches.   Hematological: Negative.  Negative for adenopathy.   Psychiatric/Behavioral: Negative for dysphoric mood and sleep disturbance. The patient has insomnia. The patient is not nervous/anxious.        Objective     Visit Vitals  /64 (BP Location: Left arm)   Pulse 60   Ht 177.8 cm (70\")   Wt 89.4 kg (197 lb)   SpO2 98%   BMI 28.27 kg/m²     Physical Exam  Constitutional:       General: He is not in acute distress.     Appearance: He is well-developed.   HENT:      Head: Normocephalic and atraumatic.      Nose: Nose normal.   Eyes:      General:         Right eye: No discharge.         Left eye: No discharge.      Conjunctiva/sclera: Conjunctivae normal.      Pupils: Pupils are equal, round, and reactive to light.   Neck:      Musculoskeletal: Normal range of motion.      Thyroid: No thyromegaly.   Cardiovascular:      Rate and Rhythm: Normal rate and regular rhythm.      Heart sounds: Normal heart sounds. No murmur.   Pulmonary:      Effort: Pulmonary effort is normal. No respiratory distress.      Breath sounds: Normal breath sounds. No wheezing or rales.   Chest:      Chest wall: No " tenderness.   Abdominal:      General: Bowel sounds are normal. There is no distension.      Palpations: Abdomen is soft. There is no mass.      Tenderness: There is no abdominal tenderness.      Hernia: No hernia is present.   Musculoskeletal: Normal range of motion.         General: No deformity.   Lymphadenopathy:      Cervical: No cervical adenopathy.   Skin:     General: Skin is warm and dry.      Coloration: Skin is not pale.      Findings: No rash.   Neurological:      Mental Status: He is alert and oriented to person, place, and time.      Deep Tendon Reflexes: Reflexes are normal and symmetric.   Psychiatric:         Behavior: Behavior normal.         Thought Content: Thought content normal.         Judgment: Judgment normal.       Results for orders placed or performed in visit on 10/12/20   Comprehensive Metabolic Panel    Specimen: Blood   Result Value Ref Range    Glucose 96 65 - 99 mg/dL    BUN 23 8 - 23 mg/dL    Creatinine 1.25 0.76 - 1.27 mg/dL    Sodium 136 136 - 145 mmol/L    Potassium 4.0 3.5 - 5.2 mmol/L    Chloride 102 98 - 107 mmol/L    CO2 22.8 22.0 - 29.0 mmol/L    Calcium 10.1 8.6 - 10.5 mg/dL    Total Protein 7.4 6.0 - 8.5 g/dL    Albumin 4.70 3.50 - 5.20 g/dL    ALT (SGPT) 57 (H) 1 - 41 U/L    AST (SGOT) 43 (H) 1 - 40 U/L    Alkaline Phosphatase 48 39 - 117 U/L    Total Bilirubin 0.7 0.0 - 1.2 mg/dL    eGFR Non African Amer 58 (L) >60 mL/min/1.73    Globulin 2.7 gm/dL    A/G Ratio 1.7 g/dL    BUN/Creatinine Ratio 18.4 7.0 - 25.0    Anion Gap 11.2 5.0 - 15.0 mmol/L   Lipid Panel    Specimen: Blood   Result Value Ref Range    Total Cholesterol 203 (H) 0 - 200 mg/dL    Triglycerides 404 (H) 0 - 150 mg/dL    HDL Cholesterol 32 (L) 40 - 60 mg/dL    LDL Cholesterol  102 (H) 0 - 100 mg/dL    VLDL Cholesterol 69 (H) 5 - 40 mg/dL    LDL/HDL Ratio 2.82    PSA Screen    Specimen: Blood   Result Value Ref Range    PSA 0.766 0.000 - 4.000 ng/mL   TSH    Specimen: Blood   Result Value Ref Range    TSH  3.060 0.270 - 4.200 uIU/mL   Vitamin B12    Specimen: Blood   Result Value Ref Range    Vitamin B-12 435 211 - 946 pg/mL   Urinalysis without microscopic (no culture) - Urine, Clean Catch    Specimen: Urine, Clean Catch   Result Value Ref Range    Color, UA Yellow Yellow, Straw    Appearance, UA Clear Clear    pH, UA 7.5 5.0 - 8.0    Specific Gravity, UA <=1.005 1.005 - 1.030    Glucose, UA Negative Negative    Ketones, UA Negative Negative    Bilirubin, UA Negative Negative    Blood, UA Negative Negative    Protein, UA Negative Negative    Leuk Esterase, UA Negative Negative    Nitrite, UA Negative Negative    Urobilinogen, UA 0.2 E.U./dL 0.2 - 1.0 E.U./dL   Urinalysis, Microscopic Only - Urine, Clean Catch    Specimen: Urine, Clean Catch   Result Value Ref Range    RBC, UA 0-2 None Seen, 0-2 /HPF    WBC, UA 0-2 None Seen, 0-2 /HPF    Bacteria, UA None Seen None Seen /HPF    Squamous Epithelial Cells, UA 0-2 None Seen, 0-2 /HPF    Hyaline Casts, UA None Seen None Seen /LPF    Methodology Automated Microscopy    CBC Auto Differential    Specimen: Blood   Result Value Ref Range    WBC 4.20 3.40 - 10.80 10*3/mm3    RBC 4.70 4.14 - 5.80 10*6/mm3    Hemoglobin 14.6 13.0 - 17.7 g/dL    Hematocrit 41.3 37.5 - 51.0 %    MCV 87.9 79.0 - 97.0 fL    MCH 31.1 26.6 - 33.0 pg    MCHC 35.4 31.5 - 35.7 g/dL    RDW 13.0 12.3 - 15.4 %    RDW-SD 41.5 37.0 - 54.0 fl    MPV 11.1 6.0 - 12.0 fL    Platelets 223 140 - 450 10*3/mm3    Neutrophil % 55.0 42.7 - 76.0 %    Lymphocyte % 31.9 19.6 - 45.3 %    Monocyte % 9.3 5.0 - 12.0 %    Eosinophil % 2.6 0.3 - 6.2 %    Basophil % 0.7 0.0 - 1.5 %    Immature Grans % 0.5 0.0 - 0.5 %    Neutrophils, Absolute 2.31 1.70 - 7.00 10*3/mm3    Lymphocytes, Absolute 1.34 0.70 - 3.10 10*3/mm3    Monocytes, Absolute 0.39 0.10 - 0.90 10*3/mm3    Eosinophils, Absolute 0.11 0.00 - 0.40 10*3/mm3    Basophils, Absolute 0.03 0.00 - 0.20 10*3/mm3    Immature Grans, Absolute 0.02 0.00 - 0.05 10*3/mm3    nRBC 0.0  0.0 - 0.2 /100 WBC      Notes brought forward are reviewed and updated if indicated.     Assessment/Plan   Problems Addressed this Visit        Cardiovascular and Mediastinum    Mixed hyperlipidemia (Chronic)    Essential hypertension (Chronic)       Nervous and Auditory    Peripheral neuropathy (Chronic)      Other Visit Diagnoses     Elevated liver enzymes    -  Primary    Relevant Orders    Comprehensive Metabolic Panel      Diagnoses       Codes Comments    Elevated liver enzymes    -  Primary ICD-10-CM: R74.8  ICD-9-CM: 790.5     Essential hypertension     ICD-10-CM: I10  ICD-9-CM: 401.9     Mixed hyperlipidemia     ICD-10-CM: E78.2  ICD-9-CM: 272.2     Peripheral polyneuropathy     ICD-10-CM: G62.9  ICD-9-CM: 356.9           Continue current treatment. Patient's Body mass index is 28.27 kg/m². BMI is above normal parameters. Recommendations include: exercise counseling and nutrition counseling.    No orders of the defined types were placed in this encounter.    Return in about 7 months (around 6/4/2021) for Recheck, medicare wellness visit.

## 2020-11-11 ENCOUNTER — OFFICE VISIT (OUTPATIENT)
Dept: FAMILY MEDICINE CLINIC | Facility: CLINIC | Age: 65
End: 2020-11-11

## 2020-11-11 ENCOUNTER — TELEPHONE (OUTPATIENT)
Dept: FAMILY MEDICINE CLINIC | Facility: CLINIC | Age: 65
End: 2020-11-11

## 2020-11-11 VITALS
WEIGHT: 196.4 LBS | SYSTOLIC BLOOD PRESSURE: 148 MMHG | DIASTOLIC BLOOD PRESSURE: 82 MMHG | OXYGEN SATURATION: 99 % | BODY MASS INDEX: 28.12 KG/M2 | HEART RATE: 63 BPM | HEIGHT: 70 IN

## 2020-11-11 DIAGNOSIS — R51.9 SCALP PAIN: Primary | ICD-10-CM

## 2020-11-11 DIAGNOSIS — M50.30 DDD (DEGENERATIVE DISC DISEASE), CERVICAL: ICD-10-CM

## 2020-11-11 DIAGNOSIS — M54.2 NECK PAIN: ICD-10-CM

## 2020-11-11 PROCEDURE — 99213 OFFICE O/P EST LOW 20 MIN: CPT | Performed by: GENERAL PRACTICE

## 2020-11-11 NOTE — PROGRESS NOTES
Subjective   Ben Abebe is a 65 y.o. male.   Chief Complaint   Patient presents with   • Facial Pain       Facial Pain  The current episode started in the past 7 days (lasted 2 days). The problem occurs constantly. The problem has been resolved. Associated symptoms include neck pain. Pertinent negatives include no abdominal pain, arthralgias, chest pain, chills, congestion, coughing, fatigue, fever, headaches, joint swelling, myalgias, nausea, numbness, rash, sore throat, vomiting or weakness.   Neck Pain   This is a chronic problem. The current episode started more than 1 year ago. The problem occurs intermittently. The problem has been waxing and waning. The pain is associated with nothing. The pain is present in the right side, left side and midline. The quality of the pain is described as aching. The pain is moderate. The symptoms are aggravated by position and twisting. The pain is worse during the day. Pertinent negatives include no chest pain, fever, headaches, numbness or weakness. He has tried NSAIDs and acetaminophen for the symptoms. The treatment provided mild relief.      The following portions of the patient's history were reviewed and updated as appropriate: allergies, current medications, past social history and problem list.    Outpatient Medications Prior to Visit   Medication Sig Dispense Refill   • Acetaminophen (TYLENOL EXTRA STRENGTH PO) Take 1 tablet by mouth As Needed.     • amLODIPine (NORVASC) 10 MG tablet Take 1 tablet by mouth Daily. 90 tablet 3   • celecoxib (CeleBREX) 200 MG capsule Take 1 capsule by mouth As Needed for Mild Pain  or Moderate Pain . 90 capsule 3   • dicyclomine (BENTYL) 20 MG tablet Take 1 tablet by mouth 4 (Four) Times a Day As Needed (bowel problem). TAKE 1 q 6-8 HOURS AS NEEDED FOR BOWEL PROBLEM 60 tablet 1   • EPINEPHrine (EPIPEN) 0.3 MG/0.3ML solution auto-injector injection Use as directed 1 each 2   • fenofibrate (TRICOR) 145 MG tablet Take 1 tablet by  mouth once daily 90 tablet 1   • fexofenadine (ALLEGRA) 180 MG tablet Take 180 mg by mouth Daily.     • fluticasone (FLONASE) 50 MCG/ACT nasal spray 2 sprays into the nostril(s) as directed by provider Daily. 1 bottle 3   • folic acid (FOLVITE) 1 MG tablet Take 1 mg by mouth Daily.     • Garlic 1000 MG capsule Take 1 capsule by mouth Daily.     • irbesartan-hydrochlorothiazide (AVALIDE) 300-12.5 MG tablet Take 1 tablet by mouth Daily. 90 tablet 3   • magnesium gluconate (MAGONATE) 500 MG tablet Take 500 mg by mouth Daily.     • metoprolol succinate XL (Toprol XL) 50 MG 24 hr tablet Take 1 tablet by mouth Daily. 90 tablet 3   • Multiple Vitamins-Minerals (CENTRUM SILVER ULTRA MENS PO) Take 1 tablet by mouth Daily.     • tiZANidine (ZANAFLEX) 4 MG tablet 1-2 tabs tid prn 360 tablet 0   • traZODone (DESYREL) 50 MG tablet 1 - 2 tabs qhs prn 180 tablet 1   • venlafaxine XR (EFFEXOR-XR) 150 MG 24 hr capsule Take 150 mg by mouth Daily.     • vitamin B-12 (CYANOCOBALAMIN) 2500 MCG sublingual tablet tablet Place 5,000 mcg under the tongue Daily.       No facility-administered medications prior to visit.        Review of Systems   Constitutional: Negative.  Negative for chills, fatigue, fever and unexpected weight change.   HENT: Positive for ear pain. Negative for congestion, hearing loss, nosebleeds, rhinorrhea, sneezing, sore throat and tinnitus.    Eyes: Negative.  Negative for discharge.   Respiratory: Negative.  Negative for cough, shortness of breath and wheezing.    Cardiovascular: Negative.  Negative for chest pain and palpitations.   Gastrointestinal: Negative.  Negative for abdominal pain, constipation, diarrhea, nausea and vomiting.   Endocrine: Negative.    Genitourinary: Negative.  Negative for dysuria, frequency and urgency.   Musculoskeletal: Positive for neck pain. Negative for arthralgias, back pain, joint swelling and myalgias.   Skin: Negative.  Negative for rash.   Allergic/Immunologic: Negative.   "  Neurological: Negative.  Negative for dizziness, weakness, numbness and headaches.   Hematological: Negative.  Negative for adenopathy.   Psychiatric/Behavioral: Negative.  Negative for dysphoric mood and sleep disturbance. The patient is not nervous/anxious.        Objective   Visit Vitals  /82 (BP Location: Left arm)   Pulse 63   Ht 177.8 cm (70\")   Wt 89.1 kg (196 lb 6.4 oz)   SpO2 99%   BMI 28.18 kg/m²     Physical Exam  Vitals signs and nursing note reviewed.   Constitutional:       General: He is not in acute distress.     Appearance: He is well-developed.   HENT:      Head: Normocephalic.      Nose: Nose normal.   Eyes:      General:         Right eye: No discharge.         Left eye: No discharge.      Conjunctiva/sclera: Conjunctivae normal.      Pupils: Pupils are equal, round, and reactive to light.   Neck:      Musculoskeletal: Normal range of motion. Spinous process tenderness and muscular tenderness present.      Thyroid: No thyromegaly.   Cardiovascular:      Rate and Rhythm: Normal rate and regular rhythm.      Heart sounds: Normal heart sounds. No murmur.   Pulmonary:      Effort: Pulmonary effort is normal.      Breath sounds: Normal breath sounds.   Lymphadenopathy:      Cervical: No cervical adenopathy.   Skin:     General: Skin is warm and dry.   Neurological:      Mental Status: He is alert and oriented to person, place, and time.       Notes brought forward are reviewed and updated if indicated.     Assessment/Plan   Problems Addressed this Visit     None      Visit Diagnoses     Scalp pain    -  Primary    Neck pain        Relevant Orders    XR Spine Cervical 2 or 3 View    DDD (degenerative disc disease), cervical          Diagnoses       Codes Comments    Scalp pain    -  Primary ICD-10-CM: R51.9  ICD-9-CM: 784.0     Neck pain     ICD-10-CM: M54.2  ICD-9-CM: 723.1     DDD (degenerative disc disease), cervical     ICD-10-CM: M50.30  ICD-9-CM: 722.4           Will notify regarding " results. Reassured. Recheck if scalp pain returns. Physical therapy for neck.     No orders of the defined types were placed in this encounter.    Return if symptoms worsen or fail to improve, for Next scheduled follow up.

## 2020-11-11 NOTE — TELEPHONE ENCOUNTER
-Per Dr. Hernandez. Mr. Abebe has been called with recent C-Spine x-ray results & recommendations.  Continue current medications and follow-up as planned or sooner if any problems.      ---- Message from Melvi Hernandez MD sent at 11/11/2020  3:00 PM CST -----  Call and tell shows degenerative disc disease as expected

## 2020-11-19 RX ORDER — FENOFIBRATE 145 MG/1
TABLET, COATED ORAL
Qty: 90 TABLET | Refills: 0 | Status: SHIPPED | OUTPATIENT
Start: 2020-11-19 | End: 2021-02-18 | Stop reason: SDUPTHER

## 2021-01-27 RX ORDER — TRAZODONE HYDROCHLORIDE 50 MG/1
TABLET ORAL
Qty: 180 TABLET | Refills: 1 | Status: SHIPPED | OUTPATIENT
Start: 2021-01-27 | End: 2021-06-21 | Stop reason: SDUPTHER

## 2021-02-18 RX ORDER — FENOFIBRATE 145 MG/1
145 TABLET, COATED ORAL DAILY
Qty: 90 TABLET | Refills: 1 | Status: SHIPPED | OUTPATIENT
Start: 2021-02-18 | End: 2021-06-21 | Stop reason: SDUPTHER

## 2021-02-18 NOTE — TELEPHONE ENCOUNTER
Last OV 11/11/2020    Next Saul  06/07/2021    ----- Message from Ben Abebe sent at 2/18/2021 10:30 AM [facility] -----  Regarding: Prescription Question  Contact: [phone]  Dr. Hernandez    Would you please call in a refill for Fenofibrate 145mg to [facility].    Thanks    Ben Abebe

## 2021-03-08 ENCOUNTER — IMMUNIZATION (OUTPATIENT)
Dept: VACCINE CLINIC | Facility: HOSPITAL | Age: 66
End: 2021-03-08

## 2021-03-08 PROCEDURE — 91300 HC SARSCOV02 VAC 30MCG/0.3ML IM: CPT | Performed by: THORACIC SURGERY (CARDIOTHORACIC VASCULAR SURGERY)

## 2021-03-08 PROCEDURE — 0001A: CPT | Performed by: THORACIC SURGERY (CARDIOTHORACIC VASCULAR SURGERY)

## 2021-03-25 RX ORDER — TIZANIDINE 4 MG/1
TABLET ORAL
Qty: 360 TABLET | Refills: 0 | Status: SHIPPED | OUTPATIENT
Start: 2021-03-25 | End: 2022-02-07

## 2021-03-29 ENCOUNTER — IMMUNIZATION (OUTPATIENT)
Dept: VACCINE CLINIC | Facility: HOSPITAL | Age: 66
End: 2021-03-29

## 2021-03-29 PROCEDURE — 0002A: CPT | Performed by: THORACIC SURGERY (CARDIOTHORACIC VASCULAR SURGERY)

## 2021-03-29 PROCEDURE — 91300 HC SARSCOV02 VAC 30MCG/0.3ML IM: CPT | Performed by: THORACIC SURGERY (CARDIOTHORACIC VASCULAR SURGERY)

## 2021-05-17 ENCOUNTER — OFFICE VISIT (OUTPATIENT)
Dept: FAMILY MEDICINE CLINIC | Facility: CLINIC | Age: 66
End: 2021-05-17

## 2021-05-17 ENCOUNTER — LAB (OUTPATIENT)
Dept: LAB | Facility: HOSPITAL | Age: 66
End: 2021-05-17

## 2021-05-17 VITALS
DIASTOLIC BLOOD PRESSURE: 80 MMHG | HEIGHT: 70 IN | WEIGHT: 194 LBS | SYSTOLIC BLOOD PRESSURE: 142 MMHG | OXYGEN SATURATION: 98 % | HEART RATE: 71 BPM | BODY MASS INDEX: 27.77 KG/M2

## 2021-05-17 DIAGNOSIS — R07.9 CHEST PAIN, UNSPECIFIED TYPE: Primary | ICD-10-CM

## 2021-05-17 DIAGNOSIS — R74.8 ELEVATED LIVER ENZYMES: ICD-10-CM

## 2021-05-17 DIAGNOSIS — M54.9 UPPER BACK PAIN ON LEFT SIDE: ICD-10-CM

## 2021-05-17 LAB
ALBUMIN SERPL-MCNC: 4.7 G/DL (ref 3.5–5.2)
ALBUMIN/GLOB SERPL: 2.5 G/DL
ALP SERPL-CCNC: 45 U/L (ref 39–117)
ALT SERPL W P-5'-P-CCNC: 48 U/L (ref 1–41)
ANION GAP SERPL CALCULATED.3IONS-SCNC: 8.9 MMOL/L (ref 5–15)
AST SERPL-CCNC: 33 U/L (ref 1–40)
BILIRUB SERPL-MCNC: 0.4 MG/DL (ref 0–1.2)
BUN SERPL-MCNC: 21 MG/DL (ref 8–23)
BUN/CREAT SERPL: 17.2 (ref 7–25)
CALCIUM SPEC-SCNC: 9.8 MG/DL (ref 8.6–10.5)
CHLORIDE SERPL-SCNC: 109 MMOL/L (ref 98–107)
CO2 SERPL-SCNC: 25.1 MMOL/L (ref 22–29)
CREAT SERPL-MCNC: 1.22 MG/DL (ref 0.76–1.27)
GFR SERPL CREATININE-BSD FRML MDRD: 59 ML/MIN/1.73
GLOBULIN UR ELPH-MCNC: 1.9 GM/DL
GLUCOSE SERPL-MCNC: 99 MG/DL (ref 65–99)
POTASSIUM SERPL-SCNC: 4.5 MMOL/L (ref 3.5–5.2)
PROT SERPL-MCNC: 6.6 G/DL (ref 6–8.5)
SODIUM SERPL-SCNC: 143 MMOL/L (ref 136–145)

## 2021-05-17 PROCEDURE — 80053 COMPREHEN METABOLIC PANEL: CPT

## 2021-05-17 PROCEDURE — 36415 COLL VENOUS BLD VENIPUNCTURE: CPT

## 2021-05-17 PROCEDURE — 99213 OFFICE O/P EST LOW 20 MIN: CPT | Performed by: NURSE PRACTITIONER

## 2021-05-17 RX ORDER — ASPIRIN 81 MG/1
TABLET ORAL
COMMUNITY

## 2021-05-17 NOTE — PROGRESS NOTES
Chief Complaint  Breast Pain (LEFT) and Back Pain (MIDDLE UP TO TOP)    Subjective          Ben Abebe presents to [facility] Mr. Abebe was under his house Monday (7 days ago) fixing a water leak, removing mold, twisting and pulling material out, on Tuesday he noticed his left chest hurting along with his left and mid upper back. This pain is occurring when he lifts his arm, twist, coughs, or moves in certain positions.    Back Pain  This is a new problem. The current episode started in the past 7 days. The problem occurs intermittently. The problem has been waxing and waning since onset. The pain is present in the thoracic spine. The quality of the pain is described as aching and cramping. The pain is at a severity of 6/10. The pain is moderate. The pain is worse during the day. The symptoms are aggravated by position, twisting and coughing. Associated symptoms include chest pain. Pertinent negatives include no abdominal pain, dysuria, fever, headaches, numbness, paresthesias, perianal numbness or weakness. He has tried muscle relaxant and NSAIDs for the symptoms. The treatment provided mild relief.   Chest Pain   This is a new problem. The current episode started in the past 7 days. The onset quality is gradual. The problem occurs intermittently. The problem has been waxing and waning. The pain is present in the lateral region (Left sided). The pain is mild. Quality: cramping, ache. The pain radiates to the left shoulder and mid back. Associated symptoms include back pain. Pertinent negatives include no abdominal pain, cough, dizziness, exertional chest pressure, fever, headaches, lower extremity edema, malaise/fatigue, numbness, shortness of breath, syncope, vomiting or weakness. The pain is aggravated by deep breathing and lifting arm (twisting). He has tried acetaminophen and NSAIDs for the symptoms. The treatment provided mild relief.   His past medical history is  "significant for PE. Prior diagnostic workup includes chest x-ray.       Objective   Vital Signs:   /80   Pulse 71   Ht 177.8 cm (70\")   Wt 88 kg (194 lb)   SpO2 98%   BMI 27.84 kg/m²     Physical Exam  Vitals and nursing note reviewed.   Constitutional:       Appearance: Normal appearance. He is well-developed.   HENT:      Head: Normocephalic and atraumatic.      Right Ear: Hearing normal.      Left Ear: Hearing normal.      Nose: Nose normal. No mucosal edema or rhinorrhea.   Eyes:      General: Lids are normal.      Conjunctiva/sclera: Conjunctivae normal.      Pupils: Pupils are equal, round, and reactive to light.   Neck:      Thyroid: No thyroid mass or thyromegaly.      Trachea: Trachea normal. No tracheal tenderness or tracheal deviation.   Cardiovascular:      Rate and Rhythm: Normal rate.      Pulses: Normal pulses.      Heart sounds: Normal heart sounds.   Pulmonary:      Effort: Pulmonary effort is normal. No respiratory distress.      Breath sounds: Normal breath sounds. No stridor. No wheezing or rales.   Abdominal:      Palpations: Abdomen is soft.   Musculoskeletal:         General: Normal range of motion.        Arms:       Cervical back: Normal range of motion.      Comments: Upper back/chest pain noted when patient lifts arm, twist upper body    Lymphadenopathy:      Head:      Right side of head: No submental, submandibular or tonsillar adenopathy.      Left side of head: No submental, submandibular or tonsillar adenopathy.      Cervical: No cervical adenopathy.   Skin:     General: Skin is warm and dry.   Neurological:      Mental Status: He is alert and oriented to person, place, and time.   Psychiatric:         Mood and Affect: Mood is not anxious. Affect is not inappropriate.         Speech: Speech normal.         Behavior: Behavior normal. Behavior is not agitated or hyperactive.         Thought Content: Thought content normal.         Judgment: Judgment normal.        Result " Review :   The following data was reviewed by: MAITE Schmid on 05/17/2021:    Data reviewed: Radiologic studies Chest xray Previous chest xrays reviewed          Assessment and Plan    Diagnoses and all orders for this visit:    1. Chest pain, unspecified type (Primary)  -     Cancel: XR Chest PA & Lateral  -     XR Chest 2 View    2. Upper back pain on left side      1.Left sided chest pain  Instructed to increase frequency with Zanaflex, may take up to 3 times a day  Continue taking Tylenol, can take 1000mg, no take more than 3 times a day   Xray ordered, we will call with results  Please follow up if pain persist or gets worse    2. Upper back pain   Instructed to increase frequency with Zanaflex, may take up to 3 times a day  Continue taking Tylenol, can take 1000mg, no take more than 3 times a day     Continue all current medications     I spent 20 minutes caring for Ben on this date of service. This time includes time spent by me in the following activities:preparing for the visit, reviewing tests, obtaining and/or reviewing a separately obtained history, performing a medically appropriate examination and/or evaluation , counseling and educating the patient/family/caregiver, ordering medications, tests, or procedures and documenting information in the medical record  Follow Up   Return for Next scheduled follow up.  Patient was given instructions and counseling regarding his condition or for health maintenance advice. Please see specific information pulled into the AVS if appropriate.         This document has been electronically signed by MAITE Schmid on May 17, 2021 11:15 CDT

## 2021-06-21 ENCOUNTER — OFFICE VISIT (OUTPATIENT)
Dept: FAMILY MEDICINE CLINIC | Facility: CLINIC | Age: 66
End: 2021-06-21

## 2021-06-21 VITALS
WEIGHT: 192.4 LBS | DIASTOLIC BLOOD PRESSURE: 64 MMHG | HEART RATE: 66 BPM | BODY MASS INDEX: 27.54 KG/M2 | SYSTOLIC BLOOD PRESSURE: 136 MMHG | OXYGEN SATURATION: 100 % | HEIGHT: 70 IN

## 2021-06-21 DIAGNOSIS — M51.36 DEGENERATIVE DISC DISEASE, LUMBAR: ICD-10-CM

## 2021-06-21 DIAGNOSIS — Z00.00 MEDICARE ANNUAL WELLNESS VISIT, SUBSEQUENT: Primary | ICD-10-CM

## 2021-06-21 DIAGNOSIS — E78.2 MIXED HYPERLIPIDEMIA: ICD-10-CM

## 2021-06-21 DIAGNOSIS — I10 ESSENTIAL HYPERTENSION: Chronic | ICD-10-CM

## 2021-06-21 DIAGNOSIS — Z00.00 MEDICARE ANNUAL WELLNESS VISIT, INITIAL: ICD-10-CM

## 2021-06-21 DIAGNOSIS — G47.09 OTHER INSOMNIA: ICD-10-CM

## 2021-06-21 DIAGNOSIS — Z12.5 ENCOUNTER FOR PROSTATE CANCER SCREENING: ICD-10-CM

## 2021-06-21 PROCEDURE — 90732 PPSV23 VACC 2 YRS+ SUBQ/IM: CPT | Performed by: GENERAL PRACTICE

## 2021-06-21 PROCEDURE — G0439 PPPS, SUBSEQ VISIT: HCPCS | Performed by: GENERAL PRACTICE

## 2021-06-21 PROCEDURE — G0009 ADMIN PNEUMOCOCCAL VACCINE: HCPCS | Performed by: GENERAL PRACTICE

## 2021-06-21 RX ORDER — FENOFIBRATE 145 MG/1
145 TABLET, COATED ORAL DAILY
Qty: 90 TABLET | Refills: 1 | Status: SHIPPED | OUTPATIENT
Start: 2021-06-21 | End: 2021-11-12 | Stop reason: SDUPTHER

## 2021-06-21 RX ORDER — AMLODIPINE BESYLATE 10 MG/1
10 TABLET ORAL DAILY
Qty: 90 TABLET | Refills: 3 | Status: SHIPPED | OUTPATIENT
Start: 2021-06-21 | End: 2022-07-22 | Stop reason: SDUPTHER

## 2021-06-21 RX ORDER — CELECOXIB 200 MG/1
200 CAPSULE ORAL AS NEEDED
Qty: 90 CAPSULE | Refills: 3 | Status: SHIPPED | OUTPATIENT
Start: 2021-06-21 | End: 2021-08-10

## 2021-06-21 RX ORDER — IRBESARTAN AND HYDROCHLOROTHIAZIDE 300; 12.5 MG/1; MG/1
1 TABLET, FILM COATED ORAL DAILY
Qty: 90 TABLET | Refills: 3 | Status: SHIPPED | OUTPATIENT
Start: 2021-06-21 | End: 2021-08-30 | Stop reason: SDUPTHER

## 2021-06-21 RX ORDER — METOPROLOL SUCCINATE 50 MG/1
50 TABLET, EXTENDED RELEASE ORAL DAILY
Qty: 90 TABLET | Refills: 3 | Status: SHIPPED | OUTPATIENT
Start: 2021-06-21 | End: 2022-06-23 | Stop reason: SDUPTHER

## 2021-06-21 RX ORDER — TRAZODONE HYDROCHLORIDE 50 MG/1
TABLET ORAL
Qty: 180 TABLET | Refills: 1 | Status: SHIPPED | OUTPATIENT
Start: 2021-06-21 | End: 2021-08-09

## 2021-06-21 NOTE — PATIENT INSTRUCTIONS
Medicare Wellness  Personal Prevention Plan of Service     Date of Office Visit:  2021  Encounter Provider:  Melvi Hernandez MD  Place of Service:  [facility]  Patient Name: Ben Abebe  :  1955    As part of the Medicare Wellness portion of your visit today, we are providing you with this personalized preventive plan of services (PPPS). This plan is based upon recommendations of the [place] Preventive Services Task Force (USPSTF) and the Advisory Committee on Immunization Practices (ACIP).    This lists the preventive care services that should be considered, and provides dates of when you are due. Items listed as completed are up-to-date and do not require any further intervention.    Health Maintenance   Topic Date Due   • Pneumococcal Vaccine 65+ (2 of 2 - PPSV23) 2020   • COLONOSCOPY  2021   • ANNUAL WELLNESS VISIT  2021   • INFLUENZA VACCINE  2021   • LIPID PANEL  10/12/2021   • TDAP/TD VACCINES (2 - Td or Tdap) 10/11/2026   • HEPATITIS C SCREENING  Completed   • COVID-19 Vaccine  Completed   • ZOSTER VACCINE  Completed       Orders Placed This Encounter   Procedures   • Pneumococcal Polysaccharide Vaccine 23-Valent Greater Than or Equal To 1yo Subcutaneous / IM       Return in about 4 months (around 10/21/2021) for Annual physical.

## 2021-06-21 NOTE — PROGRESS NOTES
The ABCs of the Annual Wellness Visit  Subsequent Medicare Wellness Visit    Chief Complaint   Patient presents with   • Hypertension   • Medicare Wellness-subsequent       Subjective   History of Present Illness:  Ben Abebe is a 66 y.o. male who presents for a Subsequent Medicare Wellness Visit.    HEALTH RISK ASSESSMENT    Recent Hospitalizations:  No hospitalization(s) within the last year.    Current Medical Providers:  Patient Care Team:  Melvi Hernandez MD as PCP - General    Smoking Status:  Social History     Tobacco Use   Smoking Status Never Smoker   Smokeless Tobacco Never Used       Alcohol Consumption:  Social History     Substance and Sexual Activity   Alcohol Use Yes       Depression Screen:   PHQ-2/PHQ-9 Depression Screening 6/21/2021   Little interest or pleasure in doing things 0   Feeling down, depressed, or hopeless 0   Trouble falling or staying asleep, or sleeping too much 1   Feeling tired or having little energy 1   Poor appetite or overeating 0   Feeling bad about yourself - or that you are a failure or have let yourself or your family down 0   Trouble concentrating on things, such as reading the newspaper or watching television 0   Moving or speaking so slowly that other people could have noticed. Or the opposite - being so fidgety or restless that you have been moving around a lot more than usual 0   Thoughts that you would be better off dead, or of hurting yourself in some way 0   Total Score 2   If you checked off any problems, how difficult have these problems made it for you to do your work, take care of things at home, or get along with other people? Somewhat difficult       Fall Risk Screen:  STEADI Fall Risk Assessment was completed, and patient is at LOW risk for falls.Assessment completed on:6/21/2021    Health Habits and Functional and Cognitive Screening:  Functional & Cognitive Status 6/21/2021   Do you have difficulty preparing food and eating? No   Do you have  difficulty bathing yourself, getting dressed or grooming yourself? No   Do you have difficulty using the toilet? No   Do you have difficulty moving around from place to place? No   Do you have trouble with steps or getting out of a bed or a chair? No   Current Diet Well Balanced Diet   Dental Exam Up to date   Eye Exam Not up to date   Exercise (times per week) 1 times per week   Current Exercises Include Yard Work   Current Exercise Activities Include -   Do you need help using the phone?  No   Are you deaf or do you have serious difficulty hearing?  Yes   Do you need help with transportation? No   Do you need help shopping? No   Do you need help preparing meals?  No   Do you need help with housework?  No   Do you need help with laundry? No   Do you need help taking your medications? No   Do you need help managing money? No   Do you ever drive or ride in a car without wearing a seat belt? No   Have you felt unusual stress, anger or loneliness in the last month? No   Who do you live with? Spouse   If you need help, do you have trouble finding someone available to you? No   Have you been bothered in the last four weeks by sexual problems? No   Do you have difficulty concentrating, remembering or making decisions? Yes         Does the patient have evidence of cognitive impairment? No    Asprin use counseling:Taking ASA appropriately as indicated    Age-appropriate Screening Schedule:  Refer to the list below for future screening recommendations based on patient's age, sex and/or medical conditions. Orders for these recommended tests are listed in the plan section. The patient has been provided with a written plan.    Health Maintenance   Topic Date Due   • COLONOSCOPY  02/22/2021   • INFLUENZA VACCINE  08/01/2021   • LIPID PANEL  10/12/2021   • TDAP/TD VACCINES (2 - Td or Tdap) 10/11/2026   • ZOSTER VACCINE  Completed          The following portions of the patient's history were reviewed and updated as appropriate:  allergies, current medications, past family history, past medical history, past social history, past surgical history and problem list.    Outpatient Medications Prior to Visit   Medication Sig Dispense Refill   • Acetaminophen (TYLENOL EXTRA STRENGTH PO) Take 1 tablet by mouth As Needed.     • aspirin (aspirin) 81 MG EC tablet Take  by mouth.     • dicyclomine (BENTYL) 20 MG tablet Take 1 tablet by mouth 4 (Four) Times a Day As Needed (bowel problem). TAKE 1 q 6-8 HOURS AS NEEDED FOR BOWEL PROBLEM 60 tablet 1   • EPINEPHrine (EPIPEN) 0.3 MG/0.3ML solution auto-injector injection Use as directed 1 each 2   • fexofenadine (ALLEGRA) 180 MG tablet Take 180 mg by mouth Daily.     • fluticasone (FLONASE) 50 MCG/ACT nasal spray 2 sprays into the nostril(s) as directed by provider Daily. 1 bottle 3   • folic acid (FOLVITE) 1 MG tablet Take 1 mg by mouth Daily.     • Garlic 1000 MG capsule Take 1 capsule by mouth Daily.     • magnesium gluconate (MAGONATE) 500 MG tablet Take 500 mg by mouth Daily.     • Multiple Vitamins-Minerals (CENTRUM SILVER ULTRA MENS PO) Take 1 tablet by mouth Daily.     • tiZANidine (ZANAFLEX) 4 MG tablet TAKE 1 TO 2 TABLETS BY MOUTH THREE TIMES DAILY AS NEEDED 360 tablet 0   • venlafaxine XR (EFFEXOR-XR) 150 MG 24 hr capsule Take 150 mg by mouth Daily.     • vitamin B-12 (CYANOCOBALAMIN) 2500 MCG sublingual tablet tablet Place 5,000 mcg under the tongue Daily.     • amLODIPine (NORVASC) 10 MG tablet Take 1 tablet by mouth Daily. 90 tablet 3   • celecoxib (CeleBREX) 200 MG capsule Take 1 capsule by mouth As Needed for Mild Pain  or Moderate Pain . 90 capsule 3   • fenofibrate (TRICOR) 145 MG tablet Take 1 tablet by mouth Daily. 90 tablet 1   • irbesartan-hydrochlorothiazide (AVALIDE) 300-12.5 MG tablet Take 1 tablet by mouth Daily. 90 tablet 3   • metoprolol succinate XL (Toprol XL) 50 MG 24 hr tablet Take 1 tablet by mouth Daily. 90 tablet 3   • traZODone (DESYREL) 50 MG tablet 1 - 2 tabs qhs prn  180 tablet 1     No facility-administered medications prior to visit.       Patient Active Problem List   Diagnosis   • Insomnia   • Mixed hyperlipidemia   • B12 deficiency   • IBS (irritable bowel syndrome)   • Peripheral neuropathy   • RLS (restless legs syndrome)   • Anxiety   • Depression   • Essential hypertension   • Degenerative disc disease, lumbar       Advanced Care Planning:  ACP discussion was held with the patient during this visit. Patient does not have an advance directive, information provided.    Review of Systems   Constitutional: Negative.  Negative for chills, fatigue, fever and unexpected weight change.   HENT: Negative.  Negative for congestion, ear pain, hearing loss, nosebleeds, rhinorrhea, sneezing, sore throat and tinnitus.    Eyes: Negative.  Negative for discharge.   Respiratory: Negative.  Negative for cough, shortness of breath and wheezing.    Cardiovascular: Negative.  Negative for chest pain and palpitations.   Gastrointestinal: Negative.  Negative for abdominal pain, constipation, diarrhea, nausea and vomiting.   Endocrine: Negative.    Genitourinary: Negative.  Negative for dysuria, frequency and urgency.   Musculoskeletal: Negative.  Negative for arthralgias, back pain, joint swelling, myalgias and neck pain.   Skin: Negative.  Negative for rash.   Allergic/Immunologic: Negative.    Neurological: Negative.  Negative for dizziness, weakness, numbness and headaches.   Hematological: Negative.  Negative for adenopathy.   Psychiatric/Behavioral: Negative.  Negative for dysphoric mood and sleep disturbance. The patient is not nervous/anxious.        Compared to one year ago, the patient feels his physical health is the same.  Compared to one year ago, the patient feels his mental health is the same.    Reviewed chart for potential of high risk medication in the elderly: not applicable  Reviewed chart for potential of harmful drug interactions in the elderly:not applicable    Objective   "       Vitals:    06/21/21 1053   BP: 136/64   Pulse: 66   SpO2: 100%   Weight: 87.3 kg (192 lb 6.4 oz)   Height: 177.8 cm (70\")   PainSc: 0-No pain       Body mass index is 27.61 kg/m².  Discussed the patient's BMI with him. The BMI is above average; BMI management plan is completed.    Physical Exam  Vitals and nursing note reviewed.   Constitutional:       General: He is not in acute distress.     Appearance: He is well-developed.   HENT:      Head: Normocephalic.      Nose: Nose normal.   Eyes:      General:         Right eye: No discharge.         Left eye: No discharge.      Conjunctiva/sclera: Conjunctivae normal.      Pupils: Pupils are equal, round, and reactive to light.   Neck:      Thyroid: No thyromegaly.   Cardiovascular:      Rate and Rhythm: Normal rate and regular rhythm.      Heart sounds: Normal heart sounds. No murmur heard.     Pulmonary:      Effort: Pulmonary effort is normal.      Breath sounds: Normal breath sounds.   Lymphadenopathy:      Cervical: No cervical adenopathy.   Skin:     General: Skin is warm and dry.   Neurological:      Mental Status: He is alert and oriented to person, place, and time.               Assessment/Plan   Medicare Risks and Personalized Health Plan  CMS Preventative Services Quick Reference  Advance Directive Discussion  Colon Cancer Screening  Depression/Dysphoria  Fall Risk  Immunizations Discussed/Encouraged (specific immunizations; Pneumococcal 23 )  Obesity/Overweight     The above risks/problems have been discussed with the patient.  Pertinent information has been shared with the patient in the After Visit Summary.  Follow up plans and orders are seen below in the Assessment/Plan Section.    Diagnoses and all orders for this visit:    1. Medicare annual wellness visit, subsequent (Primary)    2. Essential hypertension  -     amLODIPine (NORVASC) 10 MG tablet; Take 1 tablet by mouth Daily.  Dispense: 90 tablet; Refill: 3  -     irbesartan-hydrochlorothiazide " (AVALIDE) 300-12.5 MG tablet; Take 1 tablet by mouth Daily.  Dispense: 90 tablet; Refill: 3  -     metoprolol succinate XL (Toprol XL) 50 MG 24 hr tablet; Take 1 tablet by mouth Daily.  Dispense: 90 tablet; Refill: 3  -     Comprehensive Metabolic Panel; Future  -     Lipid Panel; Future  -     Urinalysis With Microscopic - Urine, Clean Catch; Future  -     PSA Screen; Future    3. Mixed hyperlipidemia  -     fenofibrate (TRICOR) 145 MG tablet; Take 1 tablet by mouth Daily.  Dispense: 90 tablet; Refill: 1  -     Comprehensive Metabolic Panel; Future  -     Lipid Panel; Future  -     Urinalysis With Microscopic - Urine, Clean Catch; Future  -     PSA Screen; Future    4. Other insomnia  -     traZODone (DESYREL) 50 MG tablet; 1 - 2 tabs qhs prn  Dispense: 180 tablet; Refill: 1    5. Degenerative disc disease, lumbar  -     celecoxib (CeleBREX) 200 MG capsule; Take 1 capsule by mouth As Needed for Mild Pain  or Moderate Pain .  Dispense: 90 capsule; Refill: 3    6. Medicare annual wellness visit, initial    7. Encounter for prostate cancer screening  -     PSA Screen; Future    Other orders  -     Pneumococcal Polysaccharide Vaccine 23-Valent Greater Than or Equal To 1yo Subcutaneous / IM      Follow Up:  Return in about 4 months (around 10/21/2021) for Annual physical.     An After Visit Summary and PPPS were given to the patient.    Information has been scanned into chart. Discussed importance of taking medications as prescribed. Encouraged healthy eating habits with low fat, low salt choices and working towards maintaining a healthy weight. Recommended regular exercise if able as well as care to prevent falls including avoiding anything on the floor that they could slip or trip on such as throw rugs, making sure they have a bathmat to step onto when their feet are wet and having grab bars and railings where needed.

## 2021-07-07 PROBLEM — R25.2 MUSCLE CRAMPS: Status: ACTIVE | Noted: 2020-10-20

## 2021-07-07 PROBLEM — Z98.890 HISTORY OF SURGICAL PROCEDURE: Status: ACTIVE | Noted: 2021-07-07

## 2021-07-07 PROBLEM — J30.1 ALLERGIC RHINITIS DUE TO POLLEN: Status: ACTIVE | Noted: 2021-07-07

## 2021-07-07 PROBLEM — M48.061 SPINAL STENOSIS OF LUMBAR REGION: Status: ACTIVE | Noted: 2021-07-07

## 2021-07-07 PROBLEM — R51.9 HEADACHE: Status: ACTIVE | Noted: 2021-07-07

## 2021-08-05 ENCOUNTER — APPOINTMENT (OUTPATIENT)
Dept: GENERAL RADIOLOGY | Facility: HOSPITAL | Age: 66
End: 2021-08-05

## 2021-08-05 ENCOUNTER — HOSPITAL ENCOUNTER (OUTPATIENT)
Facility: HOSPITAL | Age: 66
Discharge: HOME | End: 2021-08-06
Attending: EMERGENCY MEDICINE | Admitting: STUDENT IN AN ORGANIZED HEALTH CARE EDUCATION/TRAINING PROGRAM

## 2021-08-05 DIAGNOSIS — R07.9 CHEST PAIN, UNSPECIFIED TYPE: Primary | ICD-10-CM

## 2021-08-05 LAB
ALBUMIN SERPL-MCNC: 4.9 G/DL (ref 3.5–5.2)
ALBUMIN/GLOB SERPL: 2 G/DL
ALP SERPL-CCNC: 43 U/L (ref 39–117)
ALT SERPL W P-5'-P-CCNC: 50 U/L (ref 1–41)
ANION GAP SERPL CALCULATED.3IONS-SCNC: 12 MMOL/L (ref 5–15)
AST SERPL-CCNC: 39 U/L (ref 1–40)
BASOPHILS # BLD AUTO: 0.03 10*3/MM3 (ref 0–0.2)
BASOPHILS NFR BLD AUTO: 0.7 % (ref 0–1.5)
BILIRUB SERPL-MCNC: 0.5 MG/DL (ref 0–1.2)
BUN SERPL-MCNC: 27 MG/DL (ref 8–23)
BUN/CREAT SERPL: 20.9 (ref 7–25)
CALCIUM SPEC-SCNC: 9.8 MG/DL (ref 8.6–10.5)
CHLORIDE SERPL-SCNC: 102 MMOL/L (ref 98–107)
CO2 SERPL-SCNC: 24 MMOL/L (ref 22–29)
CREAT SERPL-MCNC: 1.29 MG/DL (ref 0.76–1.27)
D-DIMER, QUANTITATIVE (MAD,POW, STR): 321 NG/ML (FEU) (ref 0–470)
DEPRECATED RDW RBC AUTO: 41.5 FL (ref 37–54)
EOSINOPHIL # BLD AUTO: 0.06 10*3/MM3 (ref 0–0.4)
EOSINOPHIL NFR BLD AUTO: 1.3 % (ref 0.3–6.2)
ERYTHROCYTE [DISTWIDTH] IN BLOOD BY AUTOMATED COUNT: 12.7 % (ref 12.3–15.4)
FLUAV RNA RESP QL NAA+PROBE: NOT DETECTED
FLUBV RNA RESP QL NAA+PROBE: NOT DETECTED
GFR SERPL CREATININE-BSD FRML MDRD: 56 ML/MIN/1.73
GLOBULIN UR ELPH-MCNC: 2.4 GM/DL
GLUCOSE SERPL-MCNC: 99 MG/DL (ref 65–99)
HBA1C MFR BLD: 5.9 % (ref 4.8–5.6)
HCT VFR BLD AUTO: 41.4 % (ref 37.5–51)
HGB BLD-MCNC: 14.4 G/DL (ref 13–17.7)
HOLD SPECIMEN: NORMAL
IMM GRANULOCYTES # BLD AUTO: 0.01 10*3/MM3 (ref 0–0.05)
IMM GRANULOCYTES NFR BLD AUTO: 0.2 % (ref 0–0.5)
LYMPHOCYTES # BLD AUTO: 1.41 10*3/MM3 (ref 0.7–3.1)
LYMPHOCYTES NFR BLD AUTO: 31.5 % (ref 19.6–45.3)
MCH RBC QN AUTO: 31.4 PG (ref 26.6–33)
MCHC RBC AUTO-ENTMCNC: 34.8 G/DL (ref 31.5–35.7)
MCV RBC AUTO: 90.2 FL (ref 79–97)
MONOCYTES # BLD AUTO: 0.36 10*3/MM3 (ref 0.1–0.9)
MONOCYTES NFR BLD AUTO: 8 % (ref 5–12)
NEUTROPHILS NFR BLD AUTO: 2.61 10*3/MM3 (ref 1.7–7)
NEUTROPHILS NFR BLD AUTO: 58.3 % (ref 42.7–76)
NRBC BLD AUTO-RTO: 0 /100 WBC (ref 0–0.2)
NT-PROBNP SERPL-MCNC: 118.7 PG/ML (ref 0–900)
PLATELET # BLD AUTO: 240 10*3/MM3 (ref 140–450)
PMV BLD AUTO: 9.4 FL (ref 6–12)
POTASSIUM SERPL-SCNC: 3.7 MMOL/L (ref 3.5–5.2)
PROT SERPL-MCNC: 7.3 G/DL (ref 6–8.5)
RBC # BLD AUTO: 4.59 10*6/MM3 (ref 4.14–5.8)
SARS-COV-2 RNA RESP QL NAA+PROBE: NOT DETECTED
SODIUM SERPL-SCNC: 138 MMOL/L (ref 136–145)
TROPONIN T SERPL-MCNC: <0.01 NG/ML (ref 0–0.03)
WBC # BLD AUTO: 4.48 10*3/MM3 (ref 3.4–10.8)
WHOLE BLOOD HOLD SPECIMEN: NORMAL
WHOLE BLOOD HOLD SPECIMEN: NORMAL

## 2021-08-05 PROCEDURE — 83880 ASSAY OF NATRIURETIC PEPTIDE: CPT | Performed by: EMERGENCY MEDICINE

## 2021-08-05 PROCEDURE — 93005 ELECTROCARDIOGRAM TRACING: CPT | Performed by: NURSE PRACTITIONER

## 2021-08-05 PROCEDURE — 87636 SARSCOV2 & INF A&B AMP PRB: CPT | Performed by: EMERGENCY MEDICINE

## 2021-08-05 PROCEDURE — 85379 FIBRIN DEGRADATION QUANT: CPT | Performed by: EMERGENCY MEDICINE

## 2021-08-05 PROCEDURE — 84484 ASSAY OF TROPONIN QUANT: CPT | Performed by: NURSE PRACTITIONER

## 2021-08-05 PROCEDURE — 85025 COMPLETE CBC W/AUTO DIFF WBC: CPT | Performed by: EMERGENCY MEDICINE

## 2021-08-05 PROCEDURE — G0378 HOSPITAL OBSERVATION PER HR: HCPCS

## 2021-08-05 PROCEDURE — 71045 X-RAY EXAM CHEST 1 VIEW: CPT

## 2021-08-05 PROCEDURE — 36415 COLL VENOUS BLD VENIPUNCTURE: CPT

## 2021-08-05 PROCEDURE — 93005 ELECTROCARDIOGRAM TRACING: CPT | Performed by: EMERGENCY MEDICINE

## 2021-08-05 PROCEDURE — C9803 HOPD COVID-19 SPEC COLLECT: HCPCS

## 2021-08-05 PROCEDURE — 93010 ELECTROCARDIOGRAM REPORT: CPT | Performed by: INTERNAL MEDICINE

## 2021-08-05 PROCEDURE — 96360 HYDRATION IV INFUSION INIT: CPT

## 2021-08-05 PROCEDURE — 84484 ASSAY OF TROPONIN QUANT: CPT | Performed by: EMERGENCY MEDICINE

## 2021-08-05 PROCEDURE — 99285 EMERGENCY DEPT VISIT HI MDM: CPT

## 2021-08-05 PROCEDURE — 80053 COMPREHEN METABOLIC PANEL: CPT | Performed by: EMERGENCY MEDICINE

## 2021-08-05 PROCEDURE — 96361 HYDRATE IV INFUSION ADD-ON: CPT

## 2021-08-05 PROCEDURE — 93005 ELECTROCARDIOGRAM TRACING: CPT

## 2021-08-05 PROCEDURE — 83036 HEMOGLOBIN GLYCOSYLATED A1C: CPT | Performed by: NURSE PRACTITIONER

## 2021-08-05 RX ORDER — SODIUM CHLORIDE 0.9 % (FLUSH) 0.9 %
10 SYRINGE (ML) INJECTION AS NEEDED
Status: DISCONTINUED | OUTPATIENT
Start: 2021-08-05 | End: 2021-08-06 | Stop reason: HOSPADM

## 2021-08-05 RX ORDER — MORPHINE SULFATE 2 MG/ML
1 INJECTION, SOLUTION INTRAMUSCULAR; INTRAVENOUS EVERY 4 HOURS PRN
Status: DISCONTINUED | OUTPATIENT
Start: 2021-08-05 | End: 2021-08-06 | Stop reason: HOSPADM

## 2021-08-05 RX ORDER — NALOXONE HCL 0.4 MG/ML
0.4 VIAL (ML) INJECTION
Status: DISCONTINUED | OUTPATIENT
Start: 2021-08-05 | End: 2021-08-06 | Stop reason: HOSPADM

## 2021-08-05 RX ORDER — ASPIRIN 81 MG/1
81 TABLET ORAL DAILY
Status: DISCONTINUED | OUTPATIENT
Start: 2021-08-06 | End: 2021-08-06 | Stop reason: HOSPADM

## 2021-08-05 RX ORDER — AMLODIPINE BESYLATE 10 MG/1
10 TABLET ORAL DAILY
Status: DISCONTINUED | OUTPATIENT
Start: 2021-08-05 | End: 2021-08-06 | Stop reason: HOSPADM

## 2021-08-05 RX ORDER — NITROGLYCERIN 0.4 MG/1
0.4 TABLET SUBLINGUAL
Status: DISCONTINUED | OUTPATIENT
Start: 2021-08-05 | End: 2021-08-06 | Stop reason: HOSPADM

## 2021-08-05 RX ORDER — ACETAMINOPHEN 325 MG/1
650 TABLET ORAL EVERY 4 HOURS PRN
Status: DISCONTINUED | OUTPATIENT
Start: 2021-08-05 | End: 2021-08-06 | Stop reason: DRUGHIGH

## 2021-08-05 RX ORDER — ASPIRIN 81 MG/1
324 TABLET, CHEWABLE ORAL ONCE
Status: COMPLETED | OUTPATIENT
Start: 2021-08-05 | End: 2021-08-05

## 2021-08-05 RX ORDER — SODIUM CHLORIDE 9 MG/ML
100 INJECTION, SOLUTION INTRAVENOUS CONTINUOUS
Status: DISCONTINUED | OUTPATIENT
Start: 2021-08-05 | End: 2021-08-06 | Stop reason: HOSPADM

## 2021-08-05 RX ORDER — LABETALOL HYDROCHLORIDE 5 MG/ML
20 INJECTION, SOLUTION INTRAVENOUS EVERY 4 HOURS PRN
Status: DISCONTINUED | OUTPATIENT
Start: 2021-08-05 | End: 2021-08-06 | Stop reason: HOSPADM

## 2021-08-05 RX ORDER — ACETYLCYSTEINE 100 MG/ML
600 SOLUTION ORAL; RESPIRATORY (INHALATION) EVERY 12 HOURS SCHEDULED
Status: DISCONTINUED | OUTPATIENT
Start: 2021-08-05 | End: 2021-08-06 | Stop reason: HOSPADM

## 2021-08-05 RX ORDER — FOLIC ACID 1 MG/1
1 TABLET ORAL DAILY
Status: DISCONTINUED | OUTPATIENT
Start: 2021-08-05 | End: 2021-08-06 | Stop reason: HOSPADM

## 2021-08-05 RX ORDER — METOPROLOL SUCCINATE 50 MG/1
50 TABLET, EXTENDED RELEASE ORAL DAILY
Status: DISCONTINUED | OUTPATIENT
Start: 2021-08-05 | End: 2021-08-06 | Stop reason: HOSPADM

## 2021-08-05 RX ORDER — TRAZODONE HYDROCHLORIDE 50 MG/1
50 TABLET ORAL NIGHTLY PRN
Status: DISCONTINUED | OUTPATIENT
Start: 2021-08-05 | End: 2021-08-06 | Stop reason: HOSPADM

## 2021-08-05 RX ORDER — ALPRAZOLAM 0.25 MG/1
0.25 TABLET ORAL ONCE
Status: COMPLETED | OUTPATIENT
Start: 2021-08-05 | End: 2021-08-05

## 2021-08-05 RX ORDER — ONDANSETRON 4 MG/1
4 TABLET, FILM COATED ORAL EVERY 6 HOURS PRN
Status: DISCONTINUED | OUTPATIENT
Start: 2021-08-05 | End: 2021-08-06 | Stop reason: HOSPADM

## 2021-08-05 RX ORDER — ONDANSETRON 2 MG/ML
4 INJECTION INTRAMUSCULAR; INTRAVENOUS EVERY 6 HOURS PRN
Status: DISCONTINUED | OUTPATIENT
Start: 2021-08-05 | End: 2021-08-06 | Stop reason: HOSPADM

## 2021-08-05 RX ADMIN — ALPRAZOLAM 0.25 MG: 0.25 TABLET ORAL at 14:16

## 2021-08-05 RX ADMIN — SODIUM CHLORIDE 100 ML/HR: 9 INJECTION, SOLUTION INTRAVENOUS at 20:14

## 2021-08-05 RX ADMIN — ACETYLCYSTEINE 600 MG: 100 INHALANT RESPIRATORY (INHALATION) at 20:54

## 2021-08-05 RX ADMIN — ASPIRIN 324 MG: 81 TABLET, CHEWABLE ORAL at 13:53

## 2021-08-05 RX ADMIN — Medication 10 ML: at 14:16

## 2021-08-05 RX ADMIN — AMLODIPINE BESYLATE 10 MG: 10 TABLET ORAL at 20:13

## 2021-08-05 RX ADMIN — Medication 10 ML: at 15:13

## 2021-08-05 RX ADMIN — FOLIC ACID 1 MG: 1 TABLET ORAL at 20:13

## 2021-08-05 RX ADMIN — TRAZODONE HYDROCHLORIDE 50 MG: 50 TABLET ORAL at 20:14

## 2021-08-05 RX ADMIN — METOPROLOL SUCCINATE 50 MG: 50 TABLET, EXTENDED RELEASE ORAL at 20:17

## 2021-08-05 NOTE — ED PROVIDER NOTES
Subjective   Patient presents with on and off chest pain for over 3 months.  There is associated shortness of breath and dizziness and weakness this.  Patient has been evaluated by his primary care physician has noted the symptoms that were not active at the time.  She did encourage the patient to go to the ER if his symptoms recurred which they did today while he was washing a vehicle.  Patient thought it was initially some hypoglycemia and went inside and got a bite to eat but states he is continued not to feel any better with some nausea and the chest tightness.  Patient has a history of a remote nuclear med testing greater than 5 years ago which was unremarkable as far as he can remember.  No other cardiac testing has happened.  Patient does have a history of pulmonary embolism for which she was on Xarelto for several months.  After 6 months the patient was taken off and has not been on that since and is currently on daily aspirin.          Review of Systems   Constitutional: Positive for fatigue. Negative for appetite change, chills and fever.   HENT: Negative.  Negative for congestion.    Eyes: Negative.  Negative for photophobia and visual disturbance.   Respiratory: Negative.  Negative for cough, chest tightness and shortness of breath.    Cardiovascular: Positive for chest pain. Negative for palpitations.   Gastrointestinal: Positive for nausea and vomiting. Negative for abdominal pain, constipation and diarrhea.   Endocrine: Negative.    Genitourinary: Negative.  Negative for decreased urine volume, dysuria, flank pain and hematuria.   Musculoskeletal: Negative.  Negative for arthralgias, back pain, myalgias, neck pain and neck stiffness.   Skin: Negative.  Negative for pallor.   Neurological: Positive for dizziness and weakness. Negative for syncope, light-headedness, numbness and headaches.   Psychiatric/Behavioral: Negative.  Negative for confusion and suicidal ideas. The patient is not nervous/anxious.     All other systems reviewed and are negative.      Past Medical History:   Diagnosis Date   • Anxiety    • Benign prostatic hyperplasia    • Cobalamin deficiency    • Depression    • Diverticular disease of colon    • Hypercholesterolemia    • Hypertension    • Insomnia    • Irritable bowel syndrome    • Migraine    • Osteoarthritis    • Peripheral neuropathy    • Restless legs        Allergies   Allergen Reactions   • Lyrica [Pregabalin] Other (See Comments)     Causes blisters to form on skin   • Ambien [Zolpidem] Hallucinations   • Bee Venom Swelling     Extreme Swelling   • Codeine Nausea And Vomiting       Past Surgical History:   Procedure Laterality Date   • COLECTOMY PARTIAL / TOTAL  11/2009   • LUMBAR DISC SURGERY  2000       Family History   Problem Relation Age of Onset   • Coronary artery disease Father    • Diabetes Father    • Hypertension Father    • Diabetes Mother    • Hypertension Mother    • Stroke Mother    • Cancer Sister    • Cancer Brother        Social History     Socioeconomic History   • Marital status:      Spouse name: Not on file   • Number of children: Not on file   • Years of education: Not on file   • Highest education level: Not on file   Tobacco Use   • Smoking status: Never Smoker   • Smokeless tobacco: Never Used   Substance and Sexual Activity   • Alcohol use: Yes   • Sexual activity: Not Currently           Objective   Physical Exam  Vitals and nursing note reviewed.   Constitutional:       General: He is not in acute distress.     Appearance: He is well-developed. He is not ill-appearing or toxic-appearing.   HENT:      Head: Normocephalic and atraumatic.   Eyes:      Conjunctiva/sclera: Conjunctivae normal.      Pupils: Pupils are equal, round, and reactive to light.   Neck:      Vascular: No JVD.   Cardiovascular:      Rate and Rhythm: Normal rate and regular rhythm.      Heart sounds: Normal heart sounds. No murmur heard.   No friction rub. No gallop.    Pulmonary:       Effort: Pulmonary effort is normal. No respiratory distress.      Breath sounds: No wheezing or rales.   Chest:      Chest wall: No tenderness.   Abdominal:      General: Bowel sounds are normal. There is no distension.      Palpations: Abdomen is soft. There is no mass.      Tenderness: There is no abdominal tenderness. There is no guarding or rebound.   Musculoskeletal:         General: Normal range of motion.      Cervical back: Normal range of motion and neck supple.   Skin:     General: Skin is warm and dry.      Capillary Refill: Capillary refill takes less than 2 seconds.   Neurological:      General: No focal deficit present.      Mental Status: He is alert and oriented to person, place, and time.   Psychiatric:         Mood and Affect: Mood is anxious.         Behavior: Behavior normal.         Thought Content: Thought content normal.         Judgment: Judgment normal.         Procedures           ED Course                                 Labs Reviewed   COMPREHENSIVE METABOLIC PANEL - Abnormal; Notable for the following components:       Result Value    BUN 27 (*)     Creatinine 1.29 (*)     ALT (SGPT) 50 (*)     eGFR Non  Amer 56 (*)     All other components within normal limits    Narrative:     GFR Normal >60  Chronic Kidney Disease <60  Kidney Failure <15     TROPONIN (IN-HOUSE) - Normal    Narrative:     Troponin T Reference Range:  <= 0.03 ng/mL-   Negative for AMI  >0.03 ng/mL-     Abnormal for myocardial necrosis.  Clinicians would have to utilize clinical acumen, EKG, Troponin and serial changes to determine if it is an Acute Myocardial Infarction or myocardial injury due to an underlying chronic condition.       Results may be falsely decreased if patient taking Biotin.     BNP (IN-HOUSE) - Normal    Narrative:     Among patients with dyspnea, NT-proBNP is highly sensitive for the detection of acute congestive heart failure. In addition NT-proBNP of <300 pg/ml effectively rules out  acute congestive heart failure with 99% negative predictive value.    Results may be falsely decreased if patient taking Biotin.     CBC WITH AUTO DIFFERENTIAL - Normal   D-DIMER, QUANTITATIVE - Normal    Narrative:     Dimer values <500 ng/ml FEU are FDA approved as aid in diagnosis of deep venous thrombosis and pulmonary embolism.  This test should not be used in an exclusion strategy with pretest probability alone.    A recent guideline regarding diagnosis for pulmonary thromboembolism recommends an adjusted exclusion criterion of age x 10 ng/ml FEU for patients >50 years of age ([place] Intern Med 2015; 163: 701-711).     RAINBOW DRAW    Narrative:     The following orders were created for panel order [place] Draw.  Procedure                               Abnormality         Status                     ---------                               -----------         ------                     Green Top (Gel)[[id number]]                                  In process                 Lavender Top[[id number]]                                     In process                 Gold Top - SST[[id number]]                                   In process                   Please view results for these tests on the individual orders.   TROPONIN (IN-HOUSE)   CBC AND DIFFERENTIAL    Narrative:     The following orders were created for panel order CBC & Differential.  Procedure                               Abnormality         Status                     ---------                               -----------         ------                     CBC Auto Differential[[id number]]        Normal              Final result                 Please view results for these tests on the individual orders.   GREEN TOP   LAVENDER TOP   GOLD TOP - SST       XR Chest 1 View   Final Result   Stable exam without acute cardiopulmonary process.      Electronically signed by:  Zheng Moss MD  8/5/2021 1:41 PM CDT   Workstation: [id number]        Heart score 7 with EKG  changes from his most recent EKG with a story very concerning for anginal symptoms.  Patient will be admitted for further cardiac evaluation.        HEART Score (for prediction of 6-week risk of major adverse cardiac event) reviewed and/or performed as part of the patient evaluation and treatment planning process.  The result associated with this review/performance is: 7       MDM    Final diagnoses:   Chest pain, unspecified type       ED Disposition  ED Disposition     ED Disposition Condition Comment    Decision to Admit  Level of Care: Telemetry [5]   Diagnosis: Chest pain, unspecified type [[id number]]   Admitting Physician: ALEXIS DAN [[id number]]   Attending Physician: ALEXIS DAN [[id number]]            No follow-up provider specified.       Medication List      No changes were made to your prescriptions during this visit.          Jose Luis Arauz MD  08/05/21 4340

## 2021-08-05 NOTE — H&P
[facility] Medicine Admission      Date of Admission: 8/5/2021      Primary Care Physician: Melvi Hernandez MD      Chief Complaint:  Chest pain    HPI:  This is a 66 year old male with a history of HTN who presents to the ED with chest pain, shortness of breath, and fatigue intermittently over 3 months.  It is sharp and tight.  It is moderate in intensity.  Today it started while washing his car.  He is now pain free.  No other known aggravating or alleviating factors.  Cardiac enzymes are negative x 3.  EKG notes subtle ST depression. D.dimer is negative. No other associated symptoms. No other known aggravating or alleviating factors.     Concurrent Medical History:  has a past medical history of Anxiety, Benign prostatic hyperplasia, Cobalamin deficiency, Depression, Diverticular disease of colon, Hypercholesterolemia, Hypertension, Insomnia, Irritable bowel syndrome, Migraine, Osteoarthritis, Peripheral neuropathy, and Restless legs.    Past Surgical History:   Past Surgical History:   Procedure Laterality Date   • COLECTOMY PARTIAL / TOTAL  11/2009   • LUMBAR DISC SURGERY  2000       Family History:   Family History   Problem Relation Age of Onset   • Coronary artery disease Father    • Diabetes Father    • Hypertension Father    • Diabetes Mother    • Hypertension Mother    • Stroke Mother    • Cancer Sister    • Cancer Brother        Social History:   Social History     Socioeconomic History   • Marital status:      Spouse name: Not on file   • Number of children: Not on file   • Years of education: Not on file   • Highest education level: Not on file   Tobacco Use   • Smoking status: Never Smoker   • Smokeless tobacco: Never Used   Substance and Sexual Activity   • Alcohol use: Yes   • Sexual activity: Not Currently       Allergies:   Allergies   Allergen Reactions   • Lyrica [Pregabalin] Other (See Comments)     Causes blisters to form on skin   • Ambien  [Zolpidem] Hallucinations   • Bee Venom Swelling     Extreme Swelling   • Codeine Nausea And Vomiting       Medications:   No current facility-administered medications on file prior to encounter.     Current Outpatient Medications on File Prior to Encounter   Medication Sig Dispense Refill   • Acetaminophen (TYLENOL EXTRA STRENGTH PO) Take 1 tablet by mouth As Needed.     • amLODIPine (NORVASC) 10 MG tablet Take 1 tablet by mouth Daily. 90 tablet 3   • aspirin (aspirin) 81 MG EC tablet Take  by mouth.     • celecoxib (CeleBREX) 200 MG capsule Take 1 capsule by mouth As Needed for Mild Pain  or Moderate Pain . 90 capsule 3   • dicyclomine (BENTYL) 20 MG tablet Take 1 tablet by mouth 4 (Four) Times a Day As Needed (bowel problem). TAKE 1 q 6-8 HOURS AS NEEDED FOR BOWEL PROBLEM 60 tablet 1   • EPINEPHrine (EPIPEN) 0.3 MG/0.3ML solution auto-injector injection Use as directed 1 each 2   • fenofibrate (TRICOR) 145 MG tablet Take 1 tablet by mouth Daily. 90 tablet 1   • fexofenadine (ALLEGRA) 180 MG tablet Take 180 mg by mouth Daily.     • fluticasone (FLONASE) 50 MCG/ACT nasal spray 2 sprays into the nostril(s) as directed by provider Daily. 1 bottle 3   • folic acid (FOLVITE) 1 MG tablet Take 1 mg by mouth Daily.     • Garlic 1000 MG capsule Take 1 capsule by mouth Daily.     • irbesartan-hydrochlorothiazide (AVALIDE) 300-12.5 MG tablet Take 1 tablet by mouth Daily. 90 tablet 3   • magnesium gluconate (MAGONATE) 500 MG tablet Take 500 mg by mouth Daily.     • metoprolol succinate XL (Toprol XL) 50 MG 24 hr tablet Take 1 tablet by mouth Daily. 90 tablet 3   • Multiple Vitamins-Minerals (CENTRUM SILVER ULTRA MENS PO) Take 1 tablet by mouth Daily.     • tiZANidine (ZANAFLEX) 4 MG tablet TAKE 1 TO 2 TABLETS BY MOUTH THREE TIMES DAILY AS NEEDED 360 tablet 0   • traZODone (DESYREL) 50 MG tablet 1 - 2 tabs qhs prn 180 tablet 1   • venlafaxine XR (EFFEXOR-XR) 150 MG 24 hr capsule Take 150 mg by mouth Daily.     • vitamin B-12  (CYANOCOBALAMIN) 2500 MCG sublingual tablet tablet Place 5,000 mcg under the tongue Daily.           Review of Systems:  Review of Systems   Constitutional: Positive for fatigue. Negative for appetite change, chills and fever.   HENT: Negative for congestion.    Eyes: Negative for visual disturbance.   Respiratory: Positive for chest tightness and shortness of breath. Negative for cough and wheezing.    Cardiovascular: Positive for chest pain. Negative for palpitations and leg swelling.   Gastrointestinal: Negative for abdominal distention, abdominal pain, diarrhea, nausea and vomiting.   Genitourinary: Negative for difficulty urinating and dysuria.   Musculoskeletal: Negative for arthralgias, back pain, myalgias and neck pain.   Skin: Negative for rash and wound.   Neurological: Negative for dizziness, syncope, weakness, light-headedness, numbness and headaches.   All other systems reviewed and are negative.     Otherwise complete ROS is negative except as mentioned above.    Physical Exam:   Temp:  [97.5 °F (36.4 °C)] 97.5 °F (36.4 °C)  Heart Rate:  [] 78  Resp:  [18-20] 20  BP: (159-209)/() 171/101  Physical Exam  Vitals reviewed.   Constitutional:       General: He is not in acute distress.     Appearance: Normal appearance. He is well-developed. He is not diaphoretic.   HENT:      Head: Normocephalic and atraumatic.   Eyes:      Conjunctiva/sclera: Conjunctivae normal.   Cardiovascular:      Rate and Rhythm: Normal rate and regular rhythm.      Heart sounds: No murmur heard.   No friction rub. No gallop.    Pulmonary:      Effort: Pulmonary effort is normal. No respiratory distress.      Breath sounds: Normal breath sounds. No wheezing or rales.   Chest:      Chest wall: No tenderness.   Abdominal:      General: Bowel sounds are normal. There is no distension.      Palpations: Abdomen is soft.      Tenderness: There is no abdominal tenderness.   Musculoskeletal:         General: No swelling or  deformity.   Skin:     General: Skin is warm and dry.      Findings: No erythema.   Neurological:      General: No focal deficit present.      Mental Status: He is alert and oriented to person, place, and time.           Results Reviewed:  I have personally reviewed current lab, radiology, and data and agree with results.  Lab Results (last 24 hours)     Procedure Component Value Units Date/Time    COVID-19 and FLU A/B PCR - Swab, Nasopharynx [[id number]]  (Normal) Collected: 08/05/21 1552    Specimen: Swab from Nasopharynx Updated: 08/05/21 1622     COVID19 Not Detected     Influenza A PCR Not Detected     Influenza B PCR Not Detected    Narrative:      Fact sheet for providers: https://www.fda.gov/media/911543/download    Fact sheet for patients: https://www.fda.gov/media/424899/download    Test performed by PCR.    Troponin [[id number]]  (Normal) Collected: 08/05/21 1510    Specimen: Blood Updated: 08/05/21 1540     Troponin T <0.010 ng/mL     Narrative:      Troponin T Reference Range:  <= 0.03 ng/mL-   Negative for AMI  >0.03 ng/mL-     Abnormal for myocardial necrosis.  Clinicians would have to utilize clinical acumen, EKG, Troponin and serial changes to determine if it is an Acute Myocardial Infarction or myocardial injury due to an underlying chronic condition.       Results may be falsely decreased if patient taking Biotin.      [place] Draw [[id number]] Collected: 08/05/21 1352    Specimen: Blood Updated: 08/05/21 1500    Narrative:      The following orders were created for panel order [place] Draw.  Procedure                               Abnormality         Status                     ---------                               -----------         ------                     Green Top (Gel)[[id number]]                                  Final result               Lavender Top[[id number]]                                     Final result               Gold Top - SST[[id number]]                                   Final  result                 Please view results for these tests on the individual orders.    Green Top (Gel) [[id number]] Collected: 08/05/21 1352    Specimen: Blood Updated: 08/05/21 1500     Extra Tube Hold for add-ons.     Comment: Auto resulted.       Lavender Top [[id number]] Collected: 08/05/21 1353    Specimen: Blood Updated: 08/05/21 1500     Extra Tube hold for add-on     Comment: Auto resulted       Gold Top - SST [[id number]] Collected: 08/05/21 1352    Specimen: Blood Updated: 08/05/21 1500     Extra Tube Hold for add-ons.     Comment: Auto resulted.       D-dimer, Quantitative [[id number]]  (Normal) Collected: 08/05/21 1353    Specimen: Blood Updated: 08/05/21 1432     D-Dimer, Quantitative 321 ng/mL (FEU)     Narrative:      Dimer values <500 ng/ml FEU are FDA approved as aid in diagnosis of deep venous thrombosis and pulmonary embolism.  This test should not be used in an exclusion strategy with pretest probability alone.    A recent guideline regarding diagnosis for pulmonary thromboembolism recommends an adjusted exclusion criterion of age x 10 ng/ml FEU for patients >50 years of age ([place] Intern Med 2015; 163: 701-711).      Comprehensive Metabolic Panel [[id number]]  (Abnormal) Collected: 08/05/21 1352    Specimen: Blood Updated: 08/05/21 1432     Glucose 99 mg/dL      BUN 27 mg/dL      Creatinine 1.29 mg/dL      Sodium 138 mmol/L      Potassium 3.7 mmol/L      Chloride 102 mmol/L      CO2 24.0 mmol/L      Calcium 9.8 mg/dL      Total Protein 7.3 g/dL      Albumin 4.90 g/dL      ALT (SGPT) 50 U/L      AST (SGOT) 39 U/L      Alkaline Phosphatase 43 U/L      Total Bilirubin 0.5 mg/dL      eGFR Non African Amer 56 mL/min/1.73      Globulin 2.4 gm/dL      A/G Ratio 2.0 g/dL      BUN/Creatinine Ratio 20.9     Anion Gap 12.0 mmol/L     Narrative:      GFR Normal >60  Chronic Kidney Disease <60  Kidney Failure <15      Troponin [[id number]]  (Normal) Collected: 08/05/21 1352    Specimen: Blood Updated: 08/05/21 1431      Troponin T <0.010 ng/mL     Narrative:      Troponin T Reference Range:  <= 0.03 ng/mL-   Negative for AMI  >0.03 ng/mL-     Abnormal for myocardial necrosis.  Clinicians would have to utilize clinical acumen, EKG, Troponin and serial changes to determine if it is an Acute Myocardial Infarction or myocardial injury due to an underlying chronic condition.       Results may be falsely decreased if patient taking Biotin.      BNP [[id number]]  (Normal) Collected: 08/05/21 1352    Specimen: Blood Updated: 08/05/21 1430     proBNP 118.7 pg/mL     Narrative:      Among patients with dyspnea, NT-proBNP is highly sensitive for the detection of acute congestive heart failure. In addition NT-proBNP of <300 pg/ml effectively rules out acute congestive heart failure with 99% negative predictive value.    Results may be falsely decreased if patient taking Biotin.      CBC & Differential [[id number]]  (Normal) Collected: 08/05/21 1353    Specimen: Blood Updated: 08/05/21 1401    Narrative:      The following orders were created for panel order CBC & Differential.  Procedure                               Abnormality         Status                     ---------                               -----------         ------                     CBC Auto Differential[[id number]]        Normal              Final result                 Please view results for these tests on the individual orders.    CBC Auto Differential [[id number]]  (Normal) Collected: 08/05/21 1353    Specimen: Blood Updated: 08/05/21 1401     WBC 4.48 10*3/mm3      RBC 4.59 10*6/mm3      Hemoglobin 14.4 g/dL      Hematocrit 41.4 %      MCV 90.2 fL      MCH 31.4 pg      MCHC 34.8 g/dL      RDW 12.7 %      RDW-SD 41.5 fl      MPV 9.4 fL      Platelets 240 10*3/mm3      Neutrophil % 58.3 %      Lymphocyte % 31.5 %      Monocyte % 8.0 %      Eosinophil % 1.3 %      Basophil % 0.7 %      Immature Grans % 0.2 %      Neutrophils, Absolute 2.61 10*3/mm3      Lymphocytes, Absolute  1.41 10*3/mm3      Monocytes, Absolute 0.36 10*3/mm3      Eosinophils, Absolute 0.06 10*3/mm3      Basophils, Absolute 0.03 10*3/mm3      Immature Grans, Absolute 0.01 10*3/mm3      nRBC 0.0 /100 WBC         Imaging Results (Last 24 Hours)     Procedure Component Value Units Date/Time    XR Chest 1 View [[id number]] Collected: 08/05/21 1326     Updated: 08/05/21 1342    Narrative:      EXAMINATION:  XR CHEST 1 VW    CLINICAL HISTORY:  66 years Male,Chest pain protocol  chest pain protocol    COMPARISON:  Chest x-ray dated 5/17/2021    FINDINGS:  No focal consolidation, pleural effusion, or  pneumothorax. Normal heart size and pulmonary vascularity. No  significant change since 5/17/2021.      Impression:      Stable exam without acute cardiopulmonary process.    Electronically signed by:  Zheng Moss MD  8/5/2021 1:41 PM CDT  Workstation: [id number]            Assessment:      Chest pain    Essential hypertension    Family history of premature CAD            Plan:  1. Observation, telemetry  2. Serial cardiac enzymes, repeat EKG  3. Cardiology consultation  4. Aspirin  5. BP control: Norvasc, Losartan, HCTZ, Toprol-XL  6. Lipid panel, Hemoglobin A1c  7. PRN NTG, PRN Morphine  8. VTE PPx: SCD  9. FULL CODE      I confirmed that the patient's Advance Care Plan is present, code status is documented, or surrogate decision maker is listed in the patient's medical record.     I have utilized all available immediate resources to obtain, update, or review the patient's current medications.     I discussed the patient's findings and my recommendations with: patient, spouse    Oli Ni, MAITE  08/05/21  17:30 CDT

## 2021-08-06 ENCOUNTER — READMISSION MANAGEMENT (OUTPATIENT)
Dept: CALL CENTER | Facility: HOSPITAL | Age: 66
End: 2021-08-06

## 2021-08-06 VITALS
HEIGHT: 70 IN | HEART RATE: 59 BPM | RESPIRATION RATE: 16 BRPM | BODY MASS INDEX: 27.09 KG/M2 | OXYGEN SATURATION: 95 % | DIASTOLIC BLOOD PRESSURE: 72 MMHG | SYSTOLIC BLOOD PRESSURE: 151 MMHG | WEIGHT: 189.2 LBS | TEMPERATURE: 96.4 F

## 2021-08-06 LAB
ANION GAP SERPL CALCULATED.3IONS-SCNC: 10 MMOL/L (ref 5–15)
BASOPHILS # BLD AUTO: 0.03 10*3/MM3 (ref 0–0.2)
BASOPHILS NFR BLD AUTO: 0.7 % (ref 0–1.5)
BUN SERPL-MCNC: 25 MG/DL (ref 8–23)
BUN/CREAT SERPL: 20.3 (ref 7–25)
CALCIUM SPEC-SCNC: 9.8 MG/DL (ref 8.6–10.5)
CHLORIDE SERPL-SCNC: 105 MMOL/L (ref 98–107)
CHOLEST SERPL-MCNC: 219 MG/DL (ref 0–200)
CO2 SERPL-SCNC: 25 MMOL/L (ref 22–29)
CREAT SERPL-MCNC: 1.23 MG/DL (ref 0.76–1.27)
DEPRECATED RDW RBC AUTO: 42.7 FL (ref 37–54)
EOSINOPHIL # BLD AUTO: 0.13 10*3/MM3 (ref 0–0.4)
EOSINOPHIL NFR BLD AUTO: 2.9 % (ref 0.3–6.2)
ERYTHROCYTE [DISTWIDTH] IN BLOOD BY AUTOMATED COUNT: 13 % (ref 12.3–15.4)
GFR SERPL CREATININE-BSD FRML MDRD: 59 ML/MIN/1.73
GLUCOSE SERPL-MCNC: 106 MG/DL (ref 65–99)
HCT VFR BLD AUTO: 39.4 % (ref 37.5–51)
HDLC SERPL-MCNC: 37 MG/DL (ref 40–60)
HGB BLD-MCNC: 13.7 G/DL (ref 13–17.7)
IMM GRANULOCYTES # BLD AUTO: 0.01 10*3/MM3 (ref 0–0.05)
IMM GRANULOCYTES NFR BLD AUTO: 0.2 % (ref 0–0.5)
INR PPP: 0.91 (ref 0.8–1.2)
LDLC SERPL CALC-MCNC: 133 MG/DL (ref 0–100)
LDLC/HDLC SERPL: 3.45 {RATIO}
LYMPHOCYTES # BLD AUTO: 1.84 10*3/MM3 (ref 0.7–3.1)
LYMPHOCYTES NFR BLD AUTO: 40.9 % (ref 19.6–45.3)
MCH RBC QN AUTO: 32.1 PG (ref 26.6–33)
MCHC RBC AUTO-ENTMCNC: 34.8 G/DL (ref 31.5–35.7)
MCV RBC AUTO: 92.3 FL (ref 79–97)
MONOCYTES # BLD AUTO: 0.43 10*3/MM3 (ref 0.1–0.9)
MONOCYTES NFR BLD AUTO: 9.6 % (ref 5–12)
NEUTROPHILS NFR BLD AUTO: 2.06 10*3/MM3 (ref 1.7–7)
NEUTROPHILS NFR BLD AUTO: 45.7 % (ref 42.7–76)
NRBC BLD AUTO-RTO: 0 /100 WBC (ref 0–0.2)
PLATELET # BLD AUTO: 212 10*3/MM3 (ref 140–450)
PMV BLD AUTO: 9.5 FL (ref 6–12)
POTASSIUM SERPL-SCNC: 4.6 MMOL/L (ref 3.5–5.2)
PROTHROMBIN TIME: 12.2 SECONDS (ref 11.1–15.3)
RBC # BLD AUTO: 4.27 10*6/MM3 (ref 4.14–5.8)
SODIUM SERPL-SCNC: 140 MMOL/L (ref 136–145)
TRIGL SERPL-MCNC: 271 MG/DL (ref 0–150)
VLDLC SERPL-MCNC: 49 MG/DL (ref 5–40)
WBC # BLD AUTO: 4.5 10*3/MM3 (ref 3.4–10.8)

## 2021-08-06 PROCEDURE — C1769 GUIDE WIRE: HCPCS | Performed by: INTERNAL MEDICINE

## 2021-08-06 PROCEDURE — 25010000002 HEPARIN (PORCINE) PER 1000 UNITS: Performed by: INTERNAL MEDICINE

## 2021-08-06 PROCEDURE — C1760 CLOSURE DEV, VASC: HCPCS | Performed by: INTERNAL MEDICINE

## 2021-08-06 PROCEDURE — C1894 INTRO/SHEATH, NON-LASER: HCPCS | Performed by: INTERNAL MEDICINE

## 2021-08-06 PROCEDURE — 85610 PROTHROMBIN TIME: CPT | Performed by: INTERNAL MEDICINE

## 2021-08-06 PROCEDURE — 0 IOPAMIDOL PER 1 ML: Performed by: INTERNAL MEDICINE

## 2021-08-06 PROCEDURE — 93571 IV DOP VEL&/PRESS C FLO 1ST: CPT | Performed by: INTERNAL MEDICINE

## 2021-08-06 PROCEDURE — G0378 HOSPITAL OBSERVATION PER HR: HCPCS

## 2021-08-06 PROCEDURE — 80048 BASIC METABOLIC PNL TOTAL CA: CPT | Performed by: INTERNAL MEDICINE

## 2021-08-06 PROCEDURE — 25010000002 ADENOSINE (DIAGNOSTIC) PER 30 MG: Performed by: INTERNAL MEDICINE

## 2021-08-06 PROCEDURE — 93005 ELECTROCARDIOGRAM TRACING: CPT | Performed by: NURSE PRACTITIONER

## 2021-08-06 PROCEDURE — 25010000002 MORPHINE PER 10 MG: Performed by: INTERNAL MEDICINE

## 2021-08-06 PROCEDURE — 93458 L HRT ARTERY/VENTRICLE ANGIO: CPT | Performed by: INTERNAL MEDICINE

## 2021-08-06 PROCEDURE — 25010000002 FENTANYL CITRATE (PF) 50 MCG/ML SOLUTION: Performed by: INTERNAL MEDICINE

## 2021-08-06 PROCEDURE — C1887 CATHETER, GUIDING: HCPCS | Performed by: INTERNAL MEDICINE

## 2021-08-06 PROCEDURE — 25010000002 MIDAZOLAM PER 1 MG: Performed by: INTERNAL MEDICINE

## 2021-08-06 PROCEDURE — 25010000002 BIVALIRUDIN TRIFLUOROACETATE 250 MG RECONSTITUTED SOLUTION: Performed by: INTERNAL MEDICINE

## 2021-08-06 PROCEDURE — 96361 HYDRATE IV INFUSION ADD-ON: CPT

## 2021-08-06 PROCEDURE — 80061 LIPID PANEL: CPT | Performed by: NURSE PRACTITIONER

## 2021-08-06 PROCEDURE — 85025 COMPLETE CBC W/AUTO DIFF WBC: CPT | Performed by: INTERNAL MEDICINE

## 2021-08-06 RX ORDER — SODIUM CHLORIDE 9 MG/ML
75 INJECTION, SOLUTION INTRAVENOUS CONTINUOUS
Status: DISCONTINUED | OUTPATIENT
Start: 2021-08-06 | End: 2021-08-06 | Stop reason: HOSPADM

## 2021-08-06 RX ORDER — ISOSORBIDE MONONITRATE 30 MG/1
30 TABLET, EXTENDED RELEASE ORAL
Qty: 30 TABLET | Refills: 0 | Status: SHIPPED | OUTPATIENT
Start: 2021-08-06 | End: 2021-08-30 | Stop reason: SDUPTHER

## 2021-08-06 RX ORDER — ISOSORBIDE MONONITRATE 30 MG/1
30 TABLET, EXTENDED RELEASE ORAL
Status: DISCONTINUED | OUTPATIENT
Start: 2021-08-06 | End: 2021-08-06 | Stop reason: HOSPADM

## 2021-08-06 RX ORDER — ATORVASTATIN CALCIUM 40 MG/1
40 TABLET, FILM COATED ORAL NIGHTLY
Status: DISCONTINUED | OUTPATIENT
Start: 2021-08-06 | End: 2021-08-06 | Stop reason: SDUPTHER

## 2021-08-06 RX ORDER — ACETAMINOPHEN 325 MG/1
650 TABLET ORAL EVERY 4 HOURS PRN
Status: DISCONTINUED | OUTPATIENT
Start: 2021-08-06 | End: 2021-08-06 | Stop reason: HOSPADM

## 2021-08-06 RX ORDER — CLOPIDOGREL BISULFATE 75 MG/1
75 TABLET ORAL DAILY
Status: DISCONTINUED | OUTPATIENT
Start: 2021-08-07 | End: 2021-08-06 | Stop reason: HOSPADM

## 2021-08-06 RX ORDER — CLOPIDOGREL BISULFATE 75 MG/1
75 TABLET ORAL DAILY
Qty: 30 TABLET | Refills: 0 | Status: SHIPPED | OUTPATIENT
Start: 2021-08-07 | End: 2021-08-30 | Stop reason: SDUPTHER

## 2021-08-06 RX ORDER — FENOFIBRATE 145 MG/1
145 TABLET, COATED ORAL DAILY
Status: DISCONTINUED | OUTPATIENT
Start: 2021-08-06 | End: 2021-08-06 | Stop reason: HOSPADM

## 2021-08-06 RX ORDER — MIDAZOLAM HYDROCHLORIDE 1 MG/ML
INJECTION INTRAMUSCULAR; INTRAVENOUS AS NEEDED
Status: DISCONTINUED | OUTPATIENT
Start: 2021-08-06 | End: 2021-08-06 | Stop reason: HOSPADM

## 2021-08-06 RX ORDER — ATORVASTATIN CALCIUM 40 MG/1
40 TABLET, FILM COATED ORAL NIGHTLY
Status: DISCONTINUED | OUTPATIENT
Start: 2021-08-06 | End: 2021-08-06 | Stop reason: HOSPADM

## 2021-08-06 RX ORDER — ATORVASTATIN CALCIUM 40 MG/1
40 TABLET, FILM COATED ORAL NIGHTLY
Qty: 30 TABLET | Refills: 0 | Status: SHIPPED | OUTPATIENT
Start: 2021-08-06 | End: 2021-08-30 | Stop reason: SDUPTHER

## 2021-08-06 RX ORDER — FENTANYL CITRATE 50 UG/ML
INJECTION, SOLUTION INTRAMUSCULAR; INTRAVENOUS AS NEEDED
Status: DISCONTINUED | OUTPATIENT
Start: 2021-08-06 | End: 2021-08-06 | Stop reason: HOSPADM

## 2021-08-06 RX ORDER — LIDOCAINE HYDROCHLORIDE 20 MG/ML
INJECTION, SOLUTION INFILTRATION; PERINEURAL AS NEEDED
Status: DISCONTINUED | OUTPATIENT
Start: 2021-08-06 | End: 2021-08-06 | Stop reason: HOSPADM

## 2021-08-06 RX ORDER — SODIUM CHLORIDE 9 MG/ML
100 INJECTION, SOLUTION INTRAVENOUS CONTINUOUS
Status: DISCONTINUED | OUTPATIENT
Start: 2021-08-06 | End: 2021-08-06 | Stop reason: HOSPADM

## 2021-08-06 RX ORDER — SODIUM CHLORIDE 0.9 % (FLUSH) 0.9 %
3 SYRINGE (ML) INJECTION EVERY 12 HOURS SCHEDULED
Status: DISCONTINUED | OUTPATIENT
Start: 2021-08-06 | End: 2021-08-06 | Stop reason: HOSPADM

## 2021-08-06 RX ORDER — BIVALIRUDIN 250 MG/5ML
INJECTION, POWDER, LYOPHILIZED, FOR SOLUTION INTRAVENOUS AS NEEDED
Status: DISCONTINUED | OUTPATIENT
Start: 2021-08-06 | End: 2021-08-06 | Stop reason: HOSPADM

## 2021-08-06 RX ORDER — HYDROCODONE BITARTRATE AND ACETAMINOPHEN 5; 325 MG/1; MG/1
1 TABLET ORAL EVERY 6 HOURS PRN
Status: DISCONTINUED | OUTPATIENT
Start: 2021-08-06 | End: 2021-08-06 | Stop reason: HOSPADM

## 2021-08-06 RX ORDER — CLOPIDOGREL BISULFATE 75 MG/1
75 TABLET ORAL DAILY
Status: DISCONTINUED | OUTPATIENT
Start: 2021-08-06 | End: 2021-08-06 | Stop reason: SDUPTHER

## 2021-08-06 RX ORDER — SODIUM CHLORIDE 0.9 % (FLUSH) 0.9 %
10 SYRINGE (ML) INJECTION AS NEEDED
Status: DISCONTINUED | OUTPATIENT
Start: 2021-08-06 | End: 2021-08-06 | Stop reason: HOSPADM

## 2021-08-06 RX ADMIN — LOSARTAN POTASSIUM: 50 TABLET, FILM COATED ORAL at 13:00

## 2021-08-06 RX ADMIN — SODIUM CHLORIDE, PRESERVATIVE FREE 3 ML: 5 INJECTION INTRAVENOUS at 12:48

## 2021-08-06 RX ADMIN — ACETAMINOPHEN 650 MG: 325 TABLET, FILM COATED ORAL at 12:50

## 2021-08-06 RX ADMIN — ACETYLCYSTEINE 600 MG: 100 INHALANT RESPIRATORY (INHALATION) at 13:23

## 2021-08-06 RX ADMIN — MORPHINE SULFATE 1 MG: 2 INJECTION, SOLUTION INTRAMUSCULAR; INTRAVENOUS at 12:33

## 2021-08-06 RX ADMIN — SODIUM CHLORIDE 75 ML/HR: 9 INJECTION, SOLUTION INTRAVENOUS at 06:05

## 2021-08-06 RX ADMIN — ASPIRIN 81 MG: 81 TABLET, FILM COATED ORAL at 12:49

## 2021-08-06 RX ADMIN — SODIUM CHLORIDE 100 ML/HR: 9 INJECTION, SOLUTION INTRAVENOUS at 12:49

## 2021-08-06 RX ADMIN — HYDROCODONE BITARTRATE AND ACETAMINOPHEN 1 TABLET: 5; 325 TABLET ORAL at 15:48

## 2021-08-06 NOTE — PLAN OF CARE
Goal Outcome Evaluation:  Plan of Care Reviewed With: patient        Progress: improving  Outcome Summary: new admit. VSS overnight. pt rested well between care. no c/o chest pain. neg trop x3. npo since 2300 for heart cath in am. consents for heart cath signed. will cont to monitor pt

## 2021-08-06 NOTE — SIGNIFICANT NOTE
Patient walked from PPU 1 to bathroom and back. Tolerated well, minimal discomfort in the right groin post ambulation.  Site checked post ambulation. Site is intact and soft.

## 2021-08-06 NOTE — PROGRESS NOTES
Patient has ongoing symptoms of chest pain.  Patient has been recommended coronary angiogram.  Risk-benefit treatment option for the coronary angiogram were discussed with the patient and an informed consent was obtained.  Patient Mallampati score #2 patient ASA classification one #2.  Risk for minimal sedation was also discussed with the patient.    Plan was to proceed with a coronary angiogram.

## 2021-08-06 NOTE — PROGRESS NOTES
Patient underwent coronary angiogram with FFR evaluation of the proximal right coronary artery which was not hemodynamically significant.  Patient was recommended medical management.    Patient would be started on Plavix 75 mg once a day along with fenofibrate Lipitor and continued on aspirin.  Would consider adding isosorbide 30 mg daily.    Patient would be stable to be discharged later on today.

## 2021-08-06 NOTE — PROGRESS NOTES
[facility] Medicine Services  INPATIENT PROGRESS NOTE    Length of Stay: 0  Date of Admission: 8/5/2021  Primary Care Physician: Melvi Hernandez MD    Subjective   Chief Complaint: Chest pain  HPI:  66 year old male with a history of HTN who presented with chest pain.  Serial cardiac enzymes were negative.  EKG demonstrated subtle ST abnormalities.  Cardiology consulted and recommended [facility] which noted non-obstructive CAD.  He complains of low back pain.     Review of Systems   Constitutional: Negative for chills and fever.   Respiratory: Negative for shortness of breath.    Cardiovascular: Negative for chest pain.   Gastrointestinal: Negative for abdominal pain, nausea and vomiting.   Musculoskeletal: Positive for back pain.        All pertinent negatives and positives are as above. All other systems have been reviewed and are negative unless otherwise stated.     Objective    Temp:  [96.3 °F (35.7 °C)-97.5 °F (36.4 °C)] 96.4 °F (35.8 °C)  Heart Rate:  [52-95] 61  Resp:  [16-20] 18  BP: (122-176)/() 146/86    Physical Exam  Vitals reviewed.   Constitutional:       Appearance: Normal appearance.   HENT:      Head: Normocephalic and atraumatic.   Cardiovascular:      Rate and Rhythm: Normal rate and regular rhythm.   Pulmonary:      Effort: Pulmonary effort is normal.      Breath sounds: Normal breath sounds.   Abdominal:      General: There is no distension.      Palpations: Abdomen is soft.      Tenderness: There is no abdominal tenderness.   Musculoskeletal:         General: No swelling or deformity.   Skin:     General: Skin is warm and dry.   Neurological:      General: No focal deficit present.      Mental Status: He is alert and oriented to person, place, and time.             Results Review:  I have reviewed the labs, radiology results, and diagnostic studies.    Laboratory Data:   Results from last 7 days   Lab Units 08/06/21  0317 08/05/21  1352   SODIUM  mmol/L 140 138   POTASSIUM mmol/L 4.6 3.7   CHLORIDE mmol/L 105 102   CO2 mmol/L 25.0 24.0   BUN mg/dL 25* 27*   CREATININE mg/dL 1.23 1.29*   GLUCOSE mg/dL 106* 99   CALCIUM mg/dL 9.8 9.8   BILIRUBIN mg/dL  --  0.5   ALK PHOS U/L  --  43   ALT (SGPT) U/L  --  50*   AST (SGOT) U/L  --  39   ANION GAP mmol/L 10.0 12.0     Estimated Creatinine Clearance: 70.7 mL/min (by C-G formula based on SCr of 1.23 mg/dL).          Results from last 7 days   Lab Units 08/06/21 0317 08/05/21  1353   WBC 10*3/mm3 4.50 4.48   HEMOGLOBIN g/dL 13.7 14.4   HEMATOCRIT % 39.4 41.4   PLATELETS 10*3/mm3 212 240     Results from last 7 days   Lab Units 08/06/21  0317   INR  0.91       Culture Data:   No results found for: BLOODCX  No results found for: URINECX  No results found for: RESPCX  No results found for: WOUNDCX  No results found for: STOOLCX  No components found for: BODYFLD    Radiology Data:   Imaging Results (Last 24 Hours)     ** No results found for the last 24 hours. **          I have reviewed the patient's current medications.     Assessment/Plan     Active Hospital Problems    Diagnosis    • **Chest pain    • Essential hypertension    • Family history of premature CAD      Non-obstructive CAD    Plan:    S/P LHC  Aspirin, Plavix  Statin  Imdur  BP control: Norvasc, Losartan, HCTZ, Toprol-XL    Possible discharge later today after ambulation      I confirmed that the patient's Advance Care Plan is present, code status is documented, or surrogate decision maker is listed in the patient's medical record.     The patient was evaluated during the global COVID-19 pandemic, and the diagnosis was suspected/considered upon their initial presentation.  Evaluation, treatment, and testing were consistent with current guidelines for patients who present with complaints or symptoms that may be related to COVID-19.          This document has been electronically signed by MAITE Benjamin on August 6, 2021 13:01 CDT

## 2021-08-06 NOTE — CONSULTS
Cardiology Consultation Note.        Patient Name: Ben Abebe  Age/Sex: 66 y.o. male  : 1955  MRN: [id number]    Date of consultation: 2021  Consulting Physician: Lan Wiley MD  Primary care Physician: Melvi Hernandez MD  Requesting Physician:  Oli Ni APRN     Reason for consultation: Chest pain unstable angina      Subjective:       Chief Complaint: Chest pain    History of Present Illness:  Ben Abebe is a 66 y.o. male     Body mass index is 26.98 kg/m².  With a past medical history significant for bilateral pulmonary embolism in 2017 with previous anticoagulation with Xarelto, stress test evaluation 15 years ago which was negative for stress-induced ischemia, arterial hypertension, hypertensive heart disease, hyperlipidemia, benign prostatic hypertrophy, anxiety and depression, irritable bowel syndrome, osteoarthritis, migraine, restless leg syndrome, peripheral neuropathy, diverticular disease of the colon s/p colectomy and strong family history for atherosclerotic coronary artery disease.    Patient apparently was in her usual state of health and was washing the van when he experienced symptoms of chest pain.      Patient has had symptoms of chest discomfort on and off for the last 3 months.  Patient after the episode of chest pain today had called Dr. Hernandez and was recommended to present to the emergency room.    Patient had associated symptoms of shortness of breath.  Patient complains of having chest pain radiating to the shoulder blade.  Patient denies any associated diaphoresis.  Patient just did not feel right.    Due to the patient persistent discomfort patient was concerned and presented to the emergency room.  Patient initial resting electrocardiogram in the emergency room revealed sinus rhythm with septal ST-T wave changes with negative troponin.    Since hospitalization patient has had a few episodes of chest discomfort.  Patient has had  symptoms of chest pain which has been increasing in frequency and severity over the last 3 months.  Patient denies recent cardiac evaluation.  Patient denies any fever chill productive cough.  Patient denies any symptoms of palpitation.  Patient denies any symptoms of lightheaded dizziness near syncope.    Patient 10 point review of system except for what stated in the history of present is negative.      Concurrent  Medical History:  1.  Chest pain unstable angina.  2.  Stress test evaluation 15 years ago which was negative for stress-induced ischemia.  3.  Arterial hypertension.  4.  Hypertensive heart disease.  5.  Concentric left ventricular hypertrophy with diastolic dysfunction.  6.  Trace to mild tricuspid regurgitation.  7.  Hyperlipidemia.  8.  Bilateral pulmonary embolism in August 2017 with anticoagulation with Xarelto.  9.  Negative ultrasound of the lower extremity for any evidence of any deep vein thrombosis.  10.  Elevated homocysteine level in 2017 after the pulmonary embolism.  11.  Irritable bowel syndrome with diverticular disease of the colon.  12.  Migraine.  13.  Osteoarthritis.  14.  Peripheral neuropathy.  15.  Benign prostatic hypertrophy.  16.  Anxiety and depression.  17.  Restless leg syndrome.  18.  Cobalamin deficiency.  19.  Chronic kidney disease.      Past Surgical History:  1.  Lumbar  discectomy.  2.  Colectomy done in 2009.  3.  Colonoscopy.      Family History: Family history is strongly positive for atherosclerotic coronary artery disease his father mother and brother.      Social History: No history of tobacco alcohol intake used to work as a        Cardiac Risk Factors:  1.  Male.  2.  Arterial hypertension.  3.  Hyperlipidemia.  4.  Family history for coronary artery disease      Allergies:  Allergies   Allergen Reactions   • Lyrica [Pregabalin] Other (See Comments)     Causes blisters to form on skin   • Ambien [Zolpidem] Hallucinations   • Bee Venom Swelling      Extreme Swelling   • Codeine Nausea And Vomiting       Medication:  Medications Prior to Admission   Medication Sig Dispense Refill Last Dose   • Acetaminophen (TYLENOL EXTRA STRENGTH PO) Take 1 tablet by mouth As Needed.      • amLODIPine (NORVASC) 10 MG tablet Take 1 tablet by mouth Daily. 90 tablet 3    • aspirin (aspirin) 81 MG EC tablet Take  by mouth.      • celecoxib (CeleBREX) 200 MG capsule Take 1 capsule by mouth As Needed for Mild Pain  or Moderate Pain . 90 capsule 3    • dicyclomine (BENTYL) 20 MG tablet Take 1 tablet by mouth 4 (Four) Times a Day As Needed (bowel problem). TAKE 1 q 6-8 HOURS AS NEEDED FOR BOWEL PROBLEM 60 tablet 1    • EPINEPHrine (EPIPEN) 0.3 MG/0.3ML solution auto-injector injection Use as directed 1 each 2    • fenofibrate (TRICOR) 145 MG tablet Take 1 tablet by mouth Daily. 90 tablet 1    • fexofenadine (ALLEGRA) 180 MG tablet Take 180 mg by mouth Daily.      • fluticasone (FLONASE) 50 MCG/ACT nasal spray 2 sprays into the nostril(s) as directed by provider Daily. 1 bottle 3    • folic acid (FOLVITE) 1 MG tablet Take 1 mg by mouth Daily.      • Garlic 1000 MG capsule Take 1 capsule by mouth Daily.      • irbesartan-hydrochlorothiazide (AVALIDE) 300-12.5 MG tablet Take 1 tablet by mouth Daily. 90 tablet 3    • magnesium gluconate (MAGONATE) 500 MG tablet Take 500 mg by mouth Daily.      • metoprolol succinate XL (Toprol XL) 50 MG 24 hr tablet Take 1 tablet by mouth Daily. 90 tablet 3    • Multiple Vitamins-Minerals (CENTRUM SILVER ULTRA MENS PO) Take 1 tablet by mouth Daily.      • tiZANidine (ZANAFLEX) 4 MG tablet TAKE 1 TO 2 TABLETS BY MOUTH THREE TIMES DAILY AS NEEDED 360 tablet 0    • traZODone (DESYREL) 50 MG tablet 1 - 2 tabs qhs prn 180 tablet 1    • venlafaxine XR (EFFEXOR-XR) 150 MG 24 hr capsule Take 150 mg by mouth Daily.      • vitamin B-12 (CYANOCOBALAMIN) 2500 MCG sublingual tablet tablet Place 5,000 mcg under the tongue Daily.              Review of Systems:        Constitutional:  Denies recent weight loss, weight gain, fever or chills, no change in exercise tolerance.     HENT:  Denies any hearing loss, epistaxis, hoarseness, or difficulty speaking.     Eyes: Wears eyeglasses or contact lenses     Respiratory:  Denies dyspnea with exertion,no cough, wheezing, or hemoptysis.     Cardiovascular: Positive for chest pain.  Negative for palpitations,  orthopnea, PND, peripheral edema, syncope, or claudication.     Gastrointestinal:  Denies change in bowel habits, dyspepsia, ulcer disease, hematochezia, or melena.  No nausea, no vomiting, no hematemesis, no diarrhea or constipation.    Endocrine: Negative for cold intolerance, heat intolerance, polydipsia, polyphagia or polyuria. Denies any history of weight change or unintended weight loss.    Genitourinary: Negative for hematuria.  No frequent urination or nocturia.      Musculoskeletal: Denies any history of arthritic symptoms or back problems .  No joint pain, joint stiffness, joint swelling, muscle pain, muscle weakness or neck pain.    Skin:  Denies any change in hair or nails, rashes, or skin lesions.     Allergic/Immunologic: Negative.  Negative for environmental allergies, food allergies or immunocompromised state.     Neurological:  Denies any history of recurrent headaches, strokes, TIA, or seizure disorder.     Hematological: Denies excessive bleeding, easy bruising, fatigue, lymphadenopathy or petechiae or any bleeding disorders.     Psychiatric/Behavioral: Denies any history of depression, substance abuse, or change in cognitive function. Denies any psychomotor reaction or tangential thought.  No depression, homicidal ideations or suicidal ideations.          Objective:     Objective:  Temp:  [96.3 °F (35.7 °C)-97.5 °F (36.4 °C)] 96.3 °F (35.7 °C)  Heart Rate:  [] 73  Resp:  [16-20] 18  BP: (147-209)/() 150/87      Body mass index is 26.98 kg/m².           Physical Exam:   General Appearance:     Alert, oriented, cooperative, in no acute distress.   Head:    Normocephalic, atraumatic, without obvious abnormality.   Eyes:           ALEJANDRO.  Lids and lashes normal, conjunctivae and sclerae normal, no icterus, no pallor.   Ears:    Ears appear intact with no abnormalities noted.   Throat:   Mucous membranes pink and moist.   Neck:   Supple, trachea midline, no carotid bruit, no organomegaly or JVD.   Lungs:     Clear to auscultation and percussion, respirations regular, even and unlabored. No wheezes, rales or rhonchi.    Heart:    Regular rhythm and normal rate, normal S1 and S2, no murmur, no gallop, no rub, no click.   Abdomen:     Soft, nontender, nondistended, no guarding, no rebound tenderness, normal bowel sounds in all four quadrants, no masses, liver and spleen nonpalpable.   Genitalia:    Deferred.   Extremities:   Moves all extremities well, no edema, no cyanosis, no  redness, no clubbing.   Pulses:   Pulses palpable and equal bilaterally.   Skin:   Moist and warm. No bleeding, bruising or rash.   Neurologic/Psychiatric:   Alert and oriented to person, place, and time.  Motor, power and tone in upper and lower extremities are grossly intact. No focal neurological deficits. Normal cognitive function. No psychomotor reaction or tangential thought. No depression, homicidal ideations and suicidal ideations.       Medication Review:   Current Facility-Administered Medications   Medication Dose Route Frequency Provider Last Rate Last Admin   • acetaminophen (TYLENOL) tablet 650 mg  650 mg Oral Q4H PRN Oli Ni APRN       • acetylcysteine (MUCOMYST) 20 % solution 600 mg  600 mg Oral Q12H Oli Ni APRN       • amLODIPine (NORVASC) tablet 10 mg  10 mg Oral Daily Oli Ni APRN       • aspirin EC tablet 81 mg  81 mg Oral Daily Oli Ni APRN       • folic acid (FOLVITE) tablet 1 mg  1 mg Oral Daily Oli Ni APRN       • labetalol  (NORMODYNE,TRANDATE) injection 20 mg  20 mg Intravenous Q4H PRN Oli Ni APRN       • losartan (COZAAR) 100 mg, hydroCHLOROthiazide (HYDRODIURIL) 12.5 mg   Oral Daily Oli Ni APRN       • metoprolol succinate XL (TOPROL-XL) 24 hr tablet 50 mg  50 mg Oral Daily Oli Ni APRN       • morphine injection 1 mg  1 mg Intravenous Q4H PRN Oli Ni APRN        And   • naloxone (NARCAN) injection 0.4 mg  0.4 mg Intravenous Q5 Min PRN Oli Ni APRN       • nitroglycerin (NITROSTAT) SL tablet 0.4 mg  0.4 mg Sublingual Q5 Min PRN Oli Ni APRN       • ondansetron (ZOFRAN) tablet 4 mg  4 mg Oral Q6H PRN Oli Ni APRN        Or   • ondansetron (ZOFRAN) injection 4 mg  4 mg Intravenous Q6H PRN Oli Ni APRN       • sodium chloride 0.9 % flush 10 mL  10 mL Intravenous PRN Oli Ni APRN   10 mL at 08/05/21 1513   • sodium chloride 0.9 % infusion  100 mL/hr Intravenous Continuous Oli Ni APRN       • traZODone (DESYREL) tablet 50 mg  50 mg Oral Nightly PRN Oli Ni APRN           Lab Review:     Results from last 7 days   Lab Units 08/05/21  1352   SODIUM mmol/L 138   POTASSIUM mmol/L 3.7   CHLORIDE mmol/L 102   CO2 mmol/L 24.0   BUN mg/dL 27*   CREATININE mg/dL 1.29*   CALCIUM mg/dL 9.8   BILIRUBIN mg/dL 0.5   ALK PHOS U/L 43   ALT (SGPT) U/L 50*   AST (SGOT) U/L 39   GLUCOSE mg/dL 99     Results from last 7 days   Lab Units 08/05/21  1510 08/05/21  1352   TROPONIN T ng/mL <0.010 <0.010         Results from last 7 days   Lab Units 08/05/21  1353   WBC 10*3/mm3 4.48   HEMOGLOBIN g/dL 14.4   HEMATOCRIT % 41.4   PLATELETS 10*3/mm3 240                           EKG:   ECG/EMG Results (last 24 hours)     Procedure Component Value Units Date/Time    ECG 12 Lead [[id number]] Collected: 08/05/21 1207     Updated: 08/05/21 1214    ECG 12 Lead [[id number]]  Collected: 08/05/21 1900     Updated: 08/05/21 1902     QT Interval 414 ms      QTC Interval 430 ms     Narrative:      Test Reason : Chest Pain  Blood Pressure :   */*   mmHG  Vent. Rate :  65 BPM     Atrial Rate :  65 BPM     P-R Int : 168 ms          QRS Dur : 104 ms      QT Int : 414 ms       P-R-T Axes :  60 -40  71 degrees     QTc Int : 430 ms    Normal sinus rhythm  Left axis deviation  Abnormal ECG  When compared with ECG of 05-AUG-2021 12:07,  Vent. rate has decreased BY  39 BPM  Borderline criteria for Anterior infarct are no longer Present  Criteria for Inferior infarct are no longer Present    Referred By:            Confirmed By:           ECHO:  Results for orders placed during the hospital encounter of 08/20/17    Adult Transthoracic Echo Complete    Interpretation Summary  · Left ventricular systolic function is normal. Estimated EF = 55%.  · Left ventricular diastolic dysfunction (grade I) consistent with impaired relaxation.       Imaging:  Imaging Results (Last 24 Hours)     Procedure Component Value Units Date/Time    XR Chest 1 View [[id number]] Collected: 08/05/21 1326     Updated: 08/05/21 1342    Narrative:      EXAMINATION:  XR CHEST 1 VW    CLINICAL HISTORY:  66 years Male,Chest pain protocol  chest pain protocol    COMPARISON:  Chest x-ray dated 5/17/2021    FINDINGS:  No focal consolidation, pleural effusion, or  pneumothorax. Normal heart size and pulmonary vascularity. No  significant change since 5/17/2021.      Impression:      Stable exam without acute cardiopulmonary process.    Electronically signed by:  Zheng Moss MD  8/5/2021 1:41 PM CDT  Workstation: [id number]          I personally viewed and interpreted the patient's EKG/Telemetry data.    Assessment:   1.  Chest pain crescendo unstable angina.  2.  Arterial hypertension.  3.  Hypertensive heart disease.  4.  Hyperlipidemia.  5.  History of pulmonary embolism.  6.  Strong family history of atherosclerotic coronary artery  disease.  7.  Elevated homocysteine level.          Plan:   1.  Chest pain.  Patient has symptoms of substernal chest pain which is suggestive of crescendo unstable angina.  Patient clearly has symptoms of substernal chest pain which has been increasing in frequency and severity.  Patient has multiple risk factors for coronary artery disease.  Patient resting electrocardiogram reveals subtle ST-T wave changes.  Patient has a present time has been recommended hospitalization.  Patient has been recommended coronary angiogram.  Risk-benefit treatment option for the coronary angiogram were discussed with the patient and an informed consent was obtained.  Patient Mallampati score #2 patient ASA classification one #2.  Risk for minimal sedation was also discussed with the patient.  Plan was to proceed with a coronary angiogram.  Patient would receive IV hydration and Mucomyst prior to the coronary angiogram.  Patient would undergo a repeat creatinine evaluation prior to the coronary angiogram.    2.  Arterial hypertension.  Patient blood pressure has been labile.  Patient had transient elevated blood pressure of 209/94.  Patient subsequent blood pressure was 130/70.  Patient would be continued on the present dose of the metoprolol and the losartan and amlodipine.  Patient has been counseled to decrease her salt intake.    3.  Hypertensive heart disease with diastolic dysfunction with an ejection fraction of 55 to 60%.  With trace to mild tricuspid regurgitation.  Patient last echocardiogram done in 2017 had revealed trace to mild tricuspid regurgitation.  Clinically at the present time patient is not in congestive heart failure.    4.  Hyperlipidemia.  Review of the record indicate patient was on fenofibrate which was stopped.  Patient has not undergone recent lipid profile check.  Patient last triglyceride was 404 with an LDL of 102 and HDL of 32.  Patient has been recommended to restart taking the fenofibrate for the  elevated triglyceride.    5.  History of pulmonary embolism with previous anticoagulation with Xarelto is noted.  Patient had undergone ultrasound of the lower extremity which was negative for any evidence of any deep vein thrombosis.  Patient denies any calf muscle tenderness or any pleuritic component to symptoms of chest pain.    6.  History of migraine.  Patient has had previous evaluation by neurology patient was on Imitrex in the past.  Patient has not had any recent episodes of migraine.    7.  Anxiety and depression is noted.    8.  Benign prostatic hypertrophy is noted.    9.  Strong family history of atherosclerotic coronary artery disease is noted.    10.  History of diverticular disease s/p colectomy is noted.    11.  Chronic kidney disease with a creatinine of 1.29.  Patient would receive IV hydration.  Review of the record indicate patient was on Avalide in the past.  Patient renal function would be followed.  Patient GFR was 56.  Patient may need nephrology evaluation if the patient continues to have elevated creatinine.    Thank you for the consultation.    The above plan of management were discussed with the patient          Time: Time spent in face-to-face evaluation of greater than 55 minutes interacting, formulating, examining and discussing the plan with the patient with 50% of greater time spent in face-to-face interaction.    Electronically signed by Lan Wiley MD, 08/05/21, 7:06 PM CDT.    Dictated utilizing Dragon dictation.

## 2021-08-06 NOTE — DISCHARGE SUMMARY
[facility] Medicine Services  DISCHARGE SUMMARY       Date of Admission: 8/5/2021  Date of Discharge:  8/6/2021  Primary Care Physician: Melvi Hernandez MD    Presenting Problem/History of Present Illness:  Chest pain, unspecified type [R07.9]       Final Discharge Diagnoses:    Chest pain    Essential hypertension    Family history of premature CAD      Consults:   Consults     Date and Time Order Name Status Description    8/5/2021  5:10 PM Inpatient Cardiology Consult Completed           Procedures Performed: Procedure(s):  Left Heart Cath                Pertinent Test Results:   Cardiac Catheterization/Vascular Study    Result Date: 8/6/2021  Cardiac Catheterization Operative Report Ben Abebe [id number] 8/6/2021 @PCP@ Reason for the procedure: Chest pain class III angina pectoris unstable Procedure performed: 1. Left heart catheterization with the left ventriculogram and iliofemoral arteriogram and Angio-Seal closure device. 2.  FFR evaluation of the proximal right coronary artery which revealed a value of 0.84 not hemodynamically significant Procedure Details The risks, benefits, complications, treatment options, and expected outcomes were discussed with the patient. The patient and/or family concurred with the proposed plan, giving informed consent. Patient was brought to the cath lab after IV hydration was begun and oral premedication was given. He was further sedated with midazolam. He was prepped and draped in the usual manner.  Using 1% local lidocaine infiltration, a 6-Tanzanian introducer sheath was inserted using the modified Seldinger technique in the right femoral artery without any difficulty. Using a 0.038 guidewire over a 6-Tanzanian, a right Nasim catheter was then advanced under fluoroscopic guidance up to the right coronary artery cusp. With minimal manipulation, the right coronary artery was cannulated and a selective angiogram was performed,  using multiple views of the right coronary artery. The right Nasim catheter was exchanged for the left Nasim catheter and advanced under fluoroscopic guidance up to the right coronary cusp. With minimal manipulation, the left coronary artery was cannulated and a selective angiogram of the left coronary artery was performed, using right and the left GROVES, [place], and PA cranial-caudal views. The 6-Frisian pigtail catheter was then advanced under fluoroscopic guidance over a 0.038 wire up to the level of the aortic root. With minimal manipulation, the pigtail catheter was advanced into the left ventricle, and hemodynamics were performed and a left ventriculogram was performed. Hemodynamic Aortic pressure:111/40 Mean:62 Left ventricular pressure:111/14 Left ventriculogram: Preserved left ventricular systolic function with an estimated ejection fraction of 55% Coronaries: Right dominant system. Left Main coronary artery: Left main coronary artery was a medium caliber vessel which was free of any disease.  The left main coronary artery bifurcated into the left anterior descending artery and circumflex artery Left anterior descending artery: Anterior descending artery was a medium caliber vessel which in the midportion had evidence of 20 to 30% stenosis.  The distal left anterior descending artery was not a large caliber vessel with evidence of good NAOMI-3 flow.  The diagonal branch was free of any obstructive stenosis with good NAOMI-3 flow Circumflex artery: Circumflex artery was a medium caliber vessel which in the proximal portion had evidence of luminal irregularity.  The first obtuse marginal branch had evidence of good NAOMI-3 flow and was free of any obstructive stenosis.  The mid and the distal circumflex artery was free of any obstructive stenosis with good NAOMI-3 flow Right coronary artery: The right coronary artery was a medium caliber vessel which in the proximal portion had evidence of a concentric 20 to 30%  stenosis followed by a discrete concentric 60 to 65% stenosis and in some view it appeared to be more than 65%.  The midportion of the right coronary artery had 30 to 40% stenosis.  There was some calcification noted in the proximal right coronary artery.  The distal right coronary artery bifurcated into the posterior descending and posterolateral branch which was free of any obstructive stenosis with good NAOMI-3 flow Estimated Blood Loss:  Minimal Specimens: None      Complications:  None; patient tolerated the procedure well.  Impression A.  Proximal right coronary artery with 20 to 30% stenosis followed by sequential 60 to 65% stenosis. B.  Nonobstructive luminal irregularity in the mid left anterior descending artery. C.  Luminal irregularity in the circumflex artery. D.  Preserved left ventricular systolic function with an ejection fraction of 55%.  Recommendations: Patient was recommended FFR evaluation of the right coronary artery   FFR evaluation of the proximal right coronary artery operative Report As a continuation of the left heart catheterization after the administration of Angiomax bolus the procedure was continued when the ACT was greater than 300 ms.  Using a 6 Hebrew right Nasim guiding catheter selective cannulation were done of the native right coronary artery which confirmed the stenosis.  FFR evaluation was done Navus catheter.  Adenosine infusion was initiated.  FFR evaluation of the right coronary artery revealed a value of 0.84 which was not hemodynamically significant. Adenosine infusion was stopped.  The FFR wire was pulled back into the guiding catheter and final angiogram was performed which showed evidence of good NAOMI-3 flow without any evidence of any dissection thrombus of bleeding. Final impression: FFR evaluation of the proximal right coronary artery revealed a value of 0.84 which is not hemodynamically significant. Recommendation: Patient was recommended medical management for  "the coronary artery disease.  Patient would be started on Plavix would be continued on the aspirin and would work aggressively on risk factor modification.  Patient has elevated triglyceride.  Patient would be started back on fenofibrate and Lipitor. Patient arterial sheath was pulled and closed with an Angio-Seal closure device and patient was transferred to the floor in stable condition. Electronically signed by Lan Wiley MD, 08/06/21, 9:03 AM CDT. Dictated utilizing Dragon dictation.    XR Chest 1 View    Result Date: 8/5/2021  EXAMINATION:  XR CHEST 1 VW CLINICAL HISTORY:  66 years Male,Chest pain protocol chest pain protocol COMPARISON:  Chest x-ray dated 5/17/2021 FINDINGS:  No focal consolidation, pleural effusion, or pneumothorax. Normal heart size and pulmonary vascularity. No significant change since 5/17/2021.     Stable exam without acute cardiopulmonary process. Electronically signed by:  Zheng Moss MD  8/5/2021 1:41 PM CDT Workstation: [id number]      HPI/Hospital Course:  The patient is a 66 y.o. male with a history of HTN who presented to [facility] with chest pain.   Serial cardiac enzymes were negative.  EKG demonstrated subtle ST abnormalities.  Cardiology consulted and recommended [facility] which noted non-obstructive CAD. He is recommended medical management. He is stable and discharged to home.    Condition on Discharge:  Stable    Physical Exam on Discharge:  /72 (BP Location: Right arm, Patient Position: Lying)   Pulse 59   Temp 96.4 °F (35.8 °C) (Oral)   Resp 16   Ht 177.8 cm (70\")   Wt 85.8 kg (189 lb 3.2 oz)   SpO2 95%   BMI 27.15 kg/m²   Physical Exam  Vitals reviewed.   Constitutional:       Appearance: Normal appearance.   HENT:      Head: Normocephalic and atraumatic.   Cardiovascular:      Rate and Rhythm: Normal rate and regular rhythm.      Pulses: Normal pulses.      Comments: Groin site without swelling or bleeding, pedal pulses intact  Pulmonary: "      Effort: Pulmonary effort is normal.      Breath sounds: Normal breath sounds.   Abdominal:      General: There is no distension.      Palpations: Abdomen is soft.      Tenderness: There is no abdominal tenderness.   Musculoskeletal:         General: No swelling or deformity.   Skin:     General: Skin is warm and dry.   Neurological:      General: No focal deficit present.      Mental Status: He is alert and oriented to person, place, and time.           Discharge Disposition:  Home or Self Care    Discharge Medications:     Discharge Medications      New Medications      Instructions Start Date   atorvastatin 40 MG tablet  Commonly known as: LIPITOR   40 mg, Oral, Nightly      clopidogrel 75 MG tablet  Commonly known as: PLAVIX   75 mg, Oral, Daily   Start Date: August 7, 2021     isosorbide mononitrate 30 MG 24 hr tablet  Commonly known as: IMDUR   30 mg, Oral, Every 24 Hours Scheduled         Changes to Medications      Instructions Start Date   fenofibrate 145 MG tablet  Commonly known as: TRICOR  What changed: additional instructions   145 mg, Oral, Daily      metoprolol succinate XL 50 MG 24 hr tablet  Commonly known as: Toprol XL  What changed: additional instructions   50 mg, Oral, Daily      tiZANidine 4 MG tablet  Commonly known as: ZANAFLEX  What changed:   · how much to take  · when to take this  · additional instructions   TAKE 1 TO 2 TABLETS BY MOUTH THREE TIMES DAILY AS NEEDED      traZODone 50 MG tablet  Commonly known as: DESYREL  What changed:   · how much to take  · when to take this  · additional instructions   1 - 2 tabs qhs prn         Continue These Medications      Instructions Start Date   amLODIPine 10 MG tablet  Commonly known as: NORVASC   10 mg, Oral, Daily      aspirin 81 MG EC tablet   Oral      celecoxib 200 MG capsule  Commonly known as: CeleBREX   200 mg, Oral, As Needed      dicyclomine 20 MG tablet  Commonly known as: BENTYL   20 mg, Oral, 4 Times Daily PRN, TAKE 1 q 6-8 HOURS  AS NEEDED FOR BOWEL PROBLEM      EPINEPHrine 0.3 MG/0.3ML solution auto-injector injection  Commonly known as: EPIPEN   Use as directed      fexofenadine 180 MG tablet  Commonly known as: ALLEGRA   180 mg, Oral, Daily      fluticasone 50 MCG/ACT nasal spray  Commonly known as: Flonase   2 sprays, Nasal, Daily      folic acid 1 MG tablet  Commonly known as: FOLVITE   1 mg, Oral, Daily, Takes in evening      Garlic 1000 MG capsule   1 capsule, Oral, Daily      irbesartan-hydrochlorothiazide 300-12.5 MG tablet  Commonly known as: AVALIDE   1 tablet, Oral, Daily      magnesium gluconate 500 MG tablet  Commonly known as: MAGONATE   500 mg, Oral, Daily, Takes in evening      multivitamin with minerals tablet tablet   1 tablet, Oral, Daily      TYLENOL EXTRA STRENGTH PO   1 tablet, Oral, As Needed         ASK your doctor about these medications      Instructions Start Date   venlafaxine  MG 24 hr capsule  Commonly known as: EFFEXOR-XR   150 mg, Oral, Daily      vitamin B-12 2500 MCG sublingual tablet tablet  Commonly known as: CYANOCOBALAMIN   5,000 mcg, Sublingual, Daily           Discharge Diet:   Diet Instructions     Diet: Cardiac, Regular      Discharge Diet:  Cardiac  Regular             Activity at Discharge:   Activity Instructions     Activity as Tolerated      Discharge Activity      No lifting > 15 lbs for 1 week          Follow-up Appointments:   1. PCP 1 week  2. Dr. Wiley 4-6 weeks  Future Appointments   Date Time Provider Department Center   11/12/2021  8:00 AM Melvi Hernandez MD [facility]       Oli Ni, APRN  08/06/21  16:54 CDT

## 2021-08-07 NOTE — OUTREACH NOTE
Prep Survey      Responses   [place] facility patient discharged from?  [place]   Is LACE score < 7 ?  Yes   Emergency Room discharge w/ pulse ox?  No   Eligibility  [facility]   Date of Admission  08/05/21   Date of Discharge  08/06/21   Discharge Disposition  Home or Self Care   Discharge diagnosis  chest pain, heart cath - nonobstructive CAD   Does the patient have one of the following disease processes/diagnoses(primary or secondary)?  Other   Does the patient have Home health ordered?  No   Is there a DME ordered?  No   Prep survey completed?  Yes          Kaye Watts RN

## 2021-08-08 DIAGNOSIS — G47.09 OTHER INSOMNIA: ICD-10-CM

## 2021-08-09 ENCOUNTER — TRANSITIONAL CARE MANAGEMENT TELEPHONE ENCOUNTER (OUTPATIENT)
Dept: CALL CENTER | Facility: HOSPITAL | Age: 66
End: 2021-08-09

## 2021-08-09 RX ORDER — TRAZODONE HYDROCHLORIDE 50 MG/1
TABLET ORAL
Qty: 180 TABLET | Refills: 0 | Status: SHIPPED | OUTPATIENT
Start: 2021-08-09 | End: 2021-11-12 | Stop reason: SDUPTHER

## 2021-08-09 NOTE — OUTREACH NOTE
Call Center TCM Note      Responses   [facility] patient discharged from?  [place]   Does the patient have one of the following disease processes/diagnoses(primary or secondary)?  Other   TCM attempt successful?  Yes   Discharge diagnosis  chest pain, heart cath - nonobstructive CAD   Meds reviewed with patient/caregiver?  Yes   Is the patient having any side effects they believe may be caused by any medication additions or changes?  No   Does the patient have all medications ordered at discharge?  Yes   Is the patient taking all medications as directed (includes completed medication regime)?  Yes   Does the patient have a primary care provider?   Yes   Does the patient have an appointment with their PCP within 7 days of discharge?  Yes   Comments regarding PCP  TCM FWP with PCP Dr Hernandez is tomorrwo 08/10/2021, pt is waiting on call from Cardio MD (Dr Wiley) [facility] for fwp appt.    Has the patient kept scheduled appointments due by today?  N/A   Has home health visited the patient within 72 hours of discharge?  N/A   Psychosocial issues?  No   Did the patient receive a copy of their discharge instructions?  Yes   Nursing interventions  Reviewed instructions with patient   What is the patient's perception of their health status since discharge?  Improving   Is the patient/caregiver able to teach back signs and symptoms related to disease process for when to call PCP?  Yes   Is the patient/caregiver able to teach back signs and symptoms related to disease process for when to call 911?  Yes   Is the patient/caregiver able to teach back the hierarchy of who to call/visit for symptoms/problems? PCP, Specialist, Home health nurse, Urgent Care, ED, 911  Yes   If the patient is a current smoker, are they able to teach back resources for cessation?  Not a smoker   TCM call completed?  Yes   Wrap up additional comments  Pt doing fine from heart cath, mild soreness at site. No chest pain, SOB present but improved.  Pt is having severe h/a from Imdur. States worse than any migraine he has ever had. Pt is seeing PCP tomorrow and will discuss. TCM FWP with PCP Dr Hernandez is tomorrwo 08/10/2021, pt is waiting on call from Cardio MD (Dr Wiley) [facility] for fwp appt.           Shelli Granado MA    8/9/2021, 15:32 CDT

## 2021-08-10 ENCOUNTER — OFFICE VISIT (OUTPATIENT)
Dept: FAMILY MEDICINE CLINIC | Facility: CLINIC | Age: 66
End: 2021-08-10

## 2021-08-10 VITALS
BODY MASS INDEX: 26.99 KG/M2 | DIASTOLIC BLOOD PRESSURE: 78 MMHG | HEIGHT: 70 IN | OXYGEN SATURATION: 98 % | HEART RATE: 90 BPM | SYSTOLIC BLOOD PRESSURE: 138 MMHG | WEIGHT: 188.5 LBS

## 2021-08-10 DIAGNOSIS — E78.2 MIXED HYPERLIPIDEMIA: Chronic | ICD-10-CM

## 2021-08-10 DIAGNOSIS — I10 ESSENTIAL HYPERTENSION: Chronic | ICD-10-CM

## 2021-08-10 DIAGNOSIS — R73.9 HYPERGLYCEMIA: ICD-10-CM

## 2021-08-10 DIAGNOSIS — Z12.5 ENCOUNTER FOR PROSTATE CANCER SCREENING: ICD-10-CM

## 2021-08-10 DIAGNOSIS — I25.119 CORONARY ARTERY DISEASE INVOLVING NATIVE CORONARY ARTERY OF NATIVE HEART WITH ANGINA PECTORIS (HCC): Primary | ICD-10-CM

## 2021-08-10 PROCEDURE — 99495 TRANSJ CARE MGMT MOD F2F 14D: CPT | Performed by: GENERAL PRACTICE

## 2021-08-10 RX ORDER — SUMATRIPTAN 50 MG/1
50 TABLET, FILM COATED ORAL AS NEEDED
COMMUNITY
End: 2021-11-12

## 2021-08-10 NOTE — PROGRESS NOTES
Transitional Care Follow Up Visit  Subjective     Ben Abebe is a 66 y.o. male who presents for a transitional care management visit.    Within 48 business hours after discharge our office contacted him via telephone to coordinate his care and needs.      I reviewed and discussed the details of that call along with the discharge summary, hospital problems, inpatient lab results, inpatient diagnostic studies, and consultation reports with Ben.     Current outpatient and discharge medications have been reconciled for the patient.  Reviewed by: Melvi Hernandez MD      Date of TCM Phone Call 8/6/2021   [facility]   Date of Admission 8/5/2021   Date of Discharge 8/6/2021   Discharge Disposition Home or Self Care     Risk for Readmission (LACE) Score: 1 (8/6/2021  5:01 AM)      History of Present Illness   Course During Hospital Stay:  Recent hospitalization, labs, xrays reviewed and medications reconciled.  Was admitted with chest pain, ended up having a heart catheterization and was found to have some nonobstructive coronary artery disease but no intervention was indicated.  Medical management was recommended.  He therefore was started on Plavix, Lipitor and fenofibrate.  He was also started on Imdur 30 mg but is not tolerating this.  This is causing headaches and weakness.  He also thinks this may be causing some mild confusion.  He has not had any further chest pain.     Copied from hospital record:   HPI/Hospital Course:  The patient is a 66 y.o. male with a history of HTN who presented to [facility] with chest pain.   Serial cardiac enzymes were negative.  EKG demonstrated subtle ST abnormalities.  Cardiology consulted and recommended [facility] which noted non-obstructive CAD. He is recommended medical management. He is stable and discharged to home.     The following portions of the patient's history were reviewed and updated as appropriate: allergies, current medications, past  family history, past medical history, past social history, past surgical history and problem list.  Outpatient Medications Prior to Visit   Medication Sig Dispense Refill   • Acetaminophen (TYLENOL EXTRA STRENGTH PO) Take 1 tablet by mouth As Needed.     • amLODIPine (NORVASC) 10 MG tablet Take 1 tablet by mouth Daily. 90 tablet 3   • aspirin (aspirin) 81 MG EC tablet Take  by mouth.     • atorvastatin (LIPITOR) 40 MG tablet Take 1 tablet by mouth Every Night. 30 tablet 0   • clopidogrel (PLAVIX) 75 MG tablet Take 1 tablet by mouth Daily. 30 tablet 0   • dicyclomine (BENTYL) 20 MG tablet Take 1 tablet by mouth 4 (Four) Times a Day As Needed (bowel problem). TAKE 1 q 6-8 HOURS AS NEEDED FOR BOWEL PROBLEM 60 tablet 1   • EPINEPHrine (EPIPEN) 0.3 MG/0.3ML solution auto-injector injection Use as directed 1 each 2   • fenofibrate (TRICOR) 145 MG tablet Take 1 tablet by mouth Daily. (Patient taking differently: Take 145 mg by mouth Daily. Takes in the evening) 90 tablet 1   • fexofenadine (ALLEGRA) 180 MG tablet Take 180 mg by mouth Daily.     • fluticasone (FLONASE) 50 MCG/ACT nasal spray 2 sprays into the nostril(s) as directed by provider Daily. 1 bottle 3   • folic acid (FOLVITE) 1 MG tablet Take 1 mg by mouth Daily. Takes in evening     • irbesartan-hydrochlorothiazide (AVALIDE) 300-12.5 MG tablet Take 1 tablet by mouth Daily. 90 tablet 3   • isosorbide mononitrate (IMDUR) 30 MG 24 hr tablet Take 1 tablet by mouth Daily. 30 tablet 0   • magnesium gluconate (MAGONATE) 500 MG tablet Take 500 mg by mouth Daily. Takes in evening     • metoprolol succinate XL (Toprol XL) 50 MG 24 hr tablet Take 1 tablet by mouth Daily. (Patient taking differently: Take 50 mg by mouth Daily. Takes in evening) 90 tablet 3   • Multiple Vitamins-Minerals (CENTRUM SILVER ULTRA MENS PO) Take 1 tablet by mouth Daily.     • SUMAtriptan (IMITREX) 50 MG tablet Take 50 mg by mouth As Needed for Migraine. Take one tablet at onset of headache. May  "repeat dose one time in 2 hours if headache not relieved.     • tiZANidine (ZANAFLEX) 4 MG tablet TAKE 1 TO 2 TABLETS BY MOUTH THREE TIMES DAILY AS NEEDED (Patient taking differently: 16 mg Daily. Takes in evening) 360 tablet 0   • traZODone (DESYREL) 50 MG tablet TAKE 1 TO 2 TABLETS BY MOUTH EVERY DAY AT BEDTIME AS NEEDED 180 tablet 0   • venlafaxine XR (EFFEXOR-XR) 150 MG 24 hr capsule Take 150 mg by mouth Daily.     • vitamin B-12 (CYANOCOBALAMIN) 2500 MCG sublingual tablet tablet Place 5,000 mcg under the tongue Daily.     • celecoxib (CeleBREX) 200 MG capsule Take 1 capsule by mouth As Needed for Mild Pain  or Moderate Pain . 90 capsule 3   • Garlic 1000 MG capsule Take 1 capsule by mouth Daily.       No facility-administered medications prior to visit.       Review of Systems   Constitutional: Negative.  Negative for chills, fatigue, fever and unexpected weight change.   HENT: Negative.  Negative for congestion, ear pain, hearing loss, nosebleeds, rhinorrhea, sneezing, sore throat and tinnitus.    Eyes: Negative.  Negative for discharge.   Respiratory: Negative.  Negative for cough, shortness of breath and wheezing.    Cardiovascular: Negative.  Negative for chest pain and palpitations.   Gastrointestinal: Negative.  Negative for abdominal pain, constipation, diarrhea, nausea and vomiting.   Endocrine: Negative.    Genitourinary: Negative.  Negative for dysuria, frequency and urgency.   Musculoskeletal: Negative.  Negative for arthralgias, back pain, joint swelling, myalgias and neck pain.   Skin: Negative.  Negative for rash.   Allergic/Immunologic: Negative.    Neurological: Negative.  Negative for dizziness, weakness, numbness and headaches.   Hematological: Negative.  Negative for adenopathy.   Psychiatric/Behavioral: Negative.  Negative for dysphoric mood and sleep disturbance. The patient is not nervous/anxious.      Objective   Visit Vitals  /78   Pulse 90   Ht 177.8 cm (70\")   Wt 85.5 kg (188 lb " 8 oz)   SpO2 98%   BMI 27.05 kg/m²     Physical Exam  Vitals and nursing note reviewed.   Constitutional:       General: He is not in acute distress.     Appearance: He is well-developed.   HENT:      Head: Normocephalic.      Nose: Nose normal.   Eyes:      General:         Right eye: No discharge.         Left eye: No discharge.      Conjunctiva/sclera: Conjunctivae normal.      Pupils: Pupils are equal, round, and reactive to light.   Neck:      Thyroid: No thyromegaly.   Cardiovascular:      Rate and Rhythm: Normal rate and regular rhythm.      Heart sounds: Normal heart sounds. No murmur heard.     Pulmonary:      Effort: Pulmonary effort is normal.      Breath sounds: Normal breath sounds.   Lymphadenopathy:      Cervical: No cervical adenopathy.   Skin:     General: Skin is warm and dry.   Neurological:      Mental Status: He is alert and oriented to person, place, and time.      Comments: MMSE 28/30       Assessment/Plan   Diagnoses and all orders for this visit:    1. Coronary artery disease involving native coronary artery of native heart with angina pectoris (CMS/[facility]) (Primary)  -     CBC & Differential; Future  -     Comprehensive Metabolic Panel; Future  -     Lipid Panel; Future  -     Hemoglobin A1c; Future  -     Urinalysis With Microscopic - Urine, Clean Catch; Future    2. Essential hypertension  -     CBC & Differential; Future  -     Comprehensive Metabolic Panel; Future  -     Lipid Panel; Future  -     Hemoglobin A1c; Future  -     Urinalysis With Microscopic - Urine, Clean Catch; Future    3. Mixed hyperlipidemia  -     CBC & Differential; Future  -     Comprehensive Metabolic Panel; Future  -     Lipid Panel; Future  -     Hemoglobin A1c; Future  -     Urinalysis With Microscopic - Urine, Clean Catch; Future    4. Encounter for prostate cancer screening  -     PSA Screen; Future    5. Hyperglycemia  -     Hemoglobin A1c; Future      No orders of the defined types were placed in this  encounter.    Is having some headaches and weakness from Imdur. Advised to decrease to 1/2 tab and review with Dr. Wiley. Continue other current medications.     Current outpatient and discharge medications have been reconciled for the patient.    Reviewed by: Melvi Hernandez MD      Return if symptoms worsen or fail to improve, for Next scheduled follow up.

## 2021-08-10 NOTE — PATIENT INSTRUCTIONS

## 2021-08-30 DIAGNOSIS — I10 ESSENTIAL HYPERTENSION: Chronic | ICD-10-CM

## 2021-08-30 LAB
QT INTERVAL: 340 MS
QT INTERVAL: 414 MS
QT INTERVAL: 426 MS
QTC INTERVAL: 430 MS
QTC INTERVAL: 435 MS
QTC INTERVAL: 447 MS

## 2021-08-30 RX ORDER — ATORVASTATIN CALCIUM 40 MG/1
40 TABLET, FILM COATED ORAL NIGHTLY
Qty: 30 TABLET | Refills: 3 | Status: SHIPPED | OUTPATIENT
Start: 2021-08-30 | End: 2021-11-12

## 2021-08-30 RX ORDER — CLOPIDOGREL BISULFATE 75 MG/1
75 TABLET ORAL DAILY
Qty: 30 TABLET | Refills: 3 | Status: SHIPPED | OUTPATIENT
Start: 2021-08-30 | End: 2022-01-03

## 2021-08-30 RX ORDER — EPINEPHRINE 0.3 MG/.3ML
INJECTION SUBCUTANEOUS
Qty: 1 EACH | Refills: 2 | Status: SHIPPED | OUTPATIENT
Start: 2021-08-30 | End: 2021-08-31 | Stop reason: SDUPTHER

## 2021-08-30 RX ORDER — IRBESARTAN AND HYDROCHLOROTHIAZIDE 300; 12.5 MG/1; MG/1
1 TABLET, FILM COATED ORAL DAILY
Qty: 90 TABLET | Refills: 3 | Status: SHIPPED | OUTPATIENT
Start: 2021-08-30 | End: 2022-08-23 | Stop reason: SDUPTHER

## 2021-08-30 RX ORDER — ISOSORBIDE MONONITRATE 30 MG/1
30 TABLET, EXTENDED RELEASE ORAL
Qty: 30 TABLET | Refills: 3 | Status: SHIPPED | OUTPATIENT
Start: 2021-08-30 | End: 2021-11-12

## 2021-08-31 RX ORDER — EPINEPHRINE 0.3 MG/.3ML
INJECTION SUBCUTANEOUS
Qty: 1 EACH | Refills: 2 | Status: SHIPPED | OUTPATIENT
Start: 2021-08-31 | End: 2023-02-27

## 2021-11-04 ENCOUNTER — LAB (OUTPATIENT)
Dept: LAB | Facility: HOSPITAL | Age: 66
End: 2021-11-04

## 2021-11-04 DIAGNOSIS — Z12.5 ENCOUNTER FOR PROSTATE CANCER SCREENING: ICD-10-CM

## 2021-11-04 DIAGNOSIS — R73.9 HYPERGLYCEMIA: ICD-10-CM

## 2021-11-04 DIAGNOSIS — I25.119 CORONARY ARTERY DISEASE INVOLVING NATIVE CORONARY ARTERY OF NATIVE HEART WITH ANGINA PECTORIS (HCC): ICD-10-CM

## 2021-11-04 DIAGNOSIS — E78.2 MIXED HYPERLIPIDEMIA: ICD-10-CM

## 2021-11-04 DIAGNOSIS — I10 ESSENTIAL HYPERTENSION: ICD-10-CM

## 2021-11-04 LAB
ALBUMIN SERPL-MCNC: 5 G/DL (ref 3.5–5.2)
ALBUMIN/GLOB SERPL: 2.4 G/DL
ALP SERPL-CCNC: 41 U/L (ref 39–117)
ALT SERPL W P-5'-P-CCNC: 46 U/L (ref 1–41)
ANION GAP SERPL CALCULATED.3IONS-SCNC: 8.6 MMOL/L (ref 5–15)
AST SERPL-CCNC: 36 U/L (ref 1–40)
BASOPHILS # BLD AUTO: 0.04 10*3/MM3 (ref 0–0.2)
BASOPHILS NFR BLD AUTO: 1.3 % (ref 0–1.5)
BILIRUB SERPL-MCNC: 0.6 MG/DL (ref 0–1.2)
BUN SERPL-MCNC: 20 MG/DL (ref 8–23)
BUN/CREAT SERPL: 17.4 (ref 7–25)
CALCIUM SPEC-SCNC: 9.8 MG/DL (ref 8.6–10.5)
CHLORIDE SERPL-SCNC: 102 MMOL/L (ref 98–107)
CHOLEST SERPL-MCNC: 177 MG/DL (ref 0–200)
CO2 SERPL-SCNC: 25.4 MMOL/L (ref 22–29)
CREAT SERPL-MCNC: 1.15 MG/DL (ref 0.76–1.27)
DEPRECATED RDW RBC AUTO: 40.1 FL (ref 37–54)
EOSINOPHIL # BLD AUTO: 0.06 10*3/MM3 (ref 0–0.4)
EOSINOPHIL NFR BLD AUTO: 2 % (ref 0.3–6.2)
ERYTHROCYTE [DISTWIDTH] IN BLOOD BY AUTOMATED COUNT: 12.3 % (ref 12.3–15.4)
GFR SERPL CREATININE-BSD FRML MDRD: 63 ML/MIN/1.73
GLOBULIN UR ELPH-MCNC: 2.1 GM/DL
GLUCOSE SERPL-MCNC: 103 MG/DL (ref 65–99)
HBA1C MFR BLD: 5.3 % (ref 4.8–5.6)
HCT VFR BLD AUTO: 39.3 % (ref 37.5–51)
HDLC SERPL-MCNC: 33 MG/DL (ref 40–60)
HGB BLD-MCNC: 13.9 G/DL (ref 13–17.7)
IMM GRANULOCYTES # BLD AUTO: 0.01 10*3/MM3 (ref 0–0.05)
IMM GRANULOCYTES NFR BLD AUTO: 0.3 % (ref 0–0.5)
LDLC SERPL CALC-MCNC: 96 MG/DL (ref 0–100)
LDLC/HDLC SERPL: 2.65 {RATIO}
LYMPHOCYTES # BLD AUTO: 1.04 10*3/MM3 (ref 0.7–3.1)
LYMPHOCYTES NFR BLD AUTO: 34.3 % (ref 19.6–45.3)
MCH RBC QN AUTO: 32 PG (ref 26.6–33)
MCHC RBC AUTO-ENTMCNC: 35.4 G/DL (ref 31.5–35.7)
MCV RBC AUTO: 90.3 FL (ref 79–97)
MONOCYTES # BLD AUTO: 0.28 10*3/MM3 (ref 0.1–0.9)
MONOCYTES NFR BLD AUTO: 9.2 % (ref 5–12)
NEUTROPHILS NFR BLD AUTO: 1.6 10*3/MM3 (ref 1.7–7)
NEUTROPHILS NFR BLD AUTO: 52.9 % (ref 42.7–76)
NRBC BLD AUTO-RTO: 0 /100 WBC (ref 0–0.2)
PLATELET # BLD AUTO: 206 10*3/MM3 (ref 140–450)
PMV BLD AUTO: 10.1 FL (ref 6–12)
POTASSIUM SERPL-SCNC: 3.7 MMOL/L (ref 3.5–5.2)
PROT SERPL-MCNC: 7.1 G/DL (ref 6–8.5)
PSA SERPL-MCNC: 0.92 NG/ML (ref 0–4)
RBC # BLD AUTO: 4.35 10*6/MM3 (ref 4.14–5.8)
SODIUM SERPL-SCNC: 136 MMOL/L (ref 136–145)
TRIGL SERPL-MCNC: 283 MG/DL (ref 0–150)
VLDLC SERPL-MCNC: 48 MG/DL (ref 5–40)
WBC # BLD AUTO: 3.03 10*3/MM3 (ref 3.4–10.8)

## 2021-11-04 PROCEDURE — 36415 COLL VENOUS BLD VENIPUNCTURE: CPT

## 2021-11-04 PROCEDURE — 85025 COMPLETE CBC W/AUTO DIFF WBC: CPT

## 2021-11-04 PROCEDURE — 80061 LIPID PANEL: CPT

## 2021-11-04 PROCEDURE — G0103 PSA SCREENING: HCPCS

## 2021-11-04 PROCEDURE — 83036 HEMOGLOBIN GLYCOSYLATED A1C: CPT

## 2021-11-04 PROCEDURE — 81001 URINALYSIS AUTO W/SCOPE: CPT

## 2021-11-04 PROCEDURE — 80053 COMPREHEN METABOLIC PANEL: CPT

## 2021-11-05 LAB
BACTERIA UR QL AUTO: NORMAL /HPF
BILIRUB UR QL STRIP: NEGATIVE
CLARITY UR: CLEAR
COLOR UR: YELLOW
GLUCOSE UR STRIP-MCNC: NEGATIVE MG/DL
HGB UR QL STRIP.AUTO: NEGATIVE
HYALINE CASTS UR QL AUTO: NORMAL /LPF
KETONES UR QL STRIP: NEGATIVE
LEUKOCYTE ESTERASE UR QL STRIP.AUTO: NEGATIVE
NITRITE UR QL STRIP: NEGATIVE
PH UR STRIP.AUTO: 7.5 [PH] (ref 5–8)
PROT UR QL STRIP: NEGATIVE
RBC # UR: NORMAL /HPF
REF LAB TEST METHOD: NORMAL
SP GR UR STRIP: 1.01 (ref 1–1.03)
SQUAMOUS #/AREA URNS HPF: NORMAL /HPF
UROBILINOGEN UR QL STRIP: NORMAL
WBC UR QL AUTO: NORMAL /HPF

## 2021-11-12 ENCOUNTER — OFFICE VISIT (OUTPATIENT)
Dept: FAMILY MEDICINE CLINIC | Facility: CLINIC | Age: 66
End: 2021-11-12

## 2021-11-12 VITALS
HEART RATE: 62 BPM | WEIGHT: 197 LBS | HEIGHT: 70 IN | SYSTOLIC BLOOD PRESSURE: 134 MMHG | OXYGEN SATURATION: 99 % | DIASTOLIC BLOOD PRESSURE: 70 MMHG | BODY MASS INDEX: 28.2 KG/M2

## 2021-11-12 DIAGNOSIS — I10 ESSENTIAL HYPERTENSION: Primary | Chronic | ICD-10-CM

## 2021-11-12 DIAGNOSIS — Z23 NEED FOR INFLUENZA VACCINATION: ICD-10-CM

## 2021-11-12 DIAGNOSIS — Z12.11 SCREENING FOR COLON CANCER: ICD-10-CM

## 2021-11-12 DIAGNOSIS — G47.09 OTHER INSOMNIA: Chronic | ICD-10-CM

## 2021-11-12 DIAGNOSIS — E78.2 MIXED HYPERLIPIDEMIA: Chronic | ICD-10-CM

## 2021-11-12 PROCEDURE — 90662 IIV NO PRSV INCREASED AG IM: CPT | Performed by: GENERAL PRACTICE

## 2021-11-12 PROCEDURE — G0008 ADMIN INFLUENZA VIRUS VAC: HCPCS | Performed by: GENERAL PRACTICE

## 2021-11-12 PROCEDURE — 99214 OFFICE O/P EST MOD 30 MIN: CPT | Performed by: GENERAL PRACTICE

## 2021-11-12 RX ORDER — TRAZODONE HYDROCHLORIDE 50 MG/1
TABLET ORAL
Qty: 180 TABLET | Refills: 1 | Status: SHIPPED | OUTPATIENT
Start: 2021-11-12 | End: 2022-06-27 | Stop reason: SDUPTHER

## 2021-11-12 RX ORDER — FENOFIBRATE 145 MG/1
145 TABLET, COATED ORAL NIGHTLY
Qty: 90 TABLET | Refills: 3 | Status: SHIPPED | OUTPATIENT
Start: 2021-11-12 | End: 2022-11-07 | Stop reason: SDUPTHER

## 2021-11-12 NOTE — PROGRESS NOTES
Subjective   Ben Abebe is a 66 y.o. male.     Chief Complaint   Patient presents with   • Annual Exam   • Hypertension   • Hyperlipidemia     For review and evaluation of management of chronic medical problems. Records reviewed. Recent labs, xrays reviewed and medications reconciled. Due for colon cancer screening.    Hypertension  This is a chronic problem. The current episode started more than 1 year ago. The problem is unchanged. The problem is uncontrolled. Pertinent negatives include no chest pain, headaches, neck pain, palpitations or shortness of breath. There are no associated agents to hypertension. Current antihypertension treatment includes beta blockers, angiotensin blockers, diuretics and calcium channel blockers. The current treatment provides significant improvement. There are no compliance problems.    Hyperlipidemia  This is a chronic problem. The current episode started more than 1 year ago. The problem is uncontrolled. Recent lipid tests were reviewed and are high. Pertinent negatives include no chest pain, myalgias or shortness of breath. Current antihyperlipidemic treatment includes fibric acid derivatives and statins. The current treatment provides significant improvement of lipids. There are no compliance problems.    Insomnia  This is a chronic problem. The current episode started more than 1 year ago. The problem occurs constantly. The problem has been unchanged. Associated symptoms include numbness. Pertinent negatives include no abdominal pain, arthralgias, chest pain, chills, congestion, coughing, fatigue, fever, headaches, joint swelling, myalgias, nausea, neck pain, rash, sore throat, vomiting or weakness. The symptoms are aggravated by stress. Treatments tried: trazodone. The treatment provided significant relief.        The following portions of the patient's history were reviewed and updated as appropriate: allergies, current medications, past family and social history and  problem list.    Outpatient Medications Prior to Visit   Medication Sig Dispense Refill   • Acetaminophen (TYLENOL EXTRA STRENGTH PO) Take 1 tablet by mouth As Needed.     • amLODIPine (NORVASC) 10 MG tablet Take 1 tablet by mouth Daily. 90 tablet 3   • aspirin (aspirin) 81 MG EC tablet Take  by mouth.     • clopidogrel (PLAVIX) 75 MG tablet Take 1 tablet by mouth Daily. 30 tablet 3   • dicyclomine (BENTYL) 20 MG tablet Take 1 tablet by mouth 4 (Four) Times a Day As Needed (bowel problem). TAKE 1 q 6-8 HOURS AS NEEDED FOR BOWEL PROBLEM 60 tablet 1   • fexofenadine (ALLEGRA) 180 MG tablet Take 180 mg by mouth Daily.     • fluticasone (FLONASE) 50 MCG/ACT nasal spray 2 sprays into the nostril(s) as directed by provider Daily. 1 bottle 3   • folic acid (FOLVITE) 1 MG tablet Take 1 mg by mouth Daily. Takes in evening     • irbesartan-hydrochlorothiazide (AVALIDE) 300-12.5 MG tablet Take 1 tablet by mouth Daily. 90 tablet 3   • magnesium gluconate (MAGONATE) 500 MG tablet Take 500 mg by mouth Daily. Takes in evening     • metoprolol succinate XL (Toprol XL) 50 MG 24 hr tablet Take 1 tablet by mouth Daily. (Patient taking differently: Take 50 mg by mouth Daily. Takes in evening) 90 tablet 3   • Multiple Vitamins-Minerals (CENTRUM SILVER ULTRA MENS PO) Take 1 tablet by mouth Daily.     • pitavastatin calcium (LIVALO) 2 MG tablet tablet Take 2 mg by mouth Every Night.     • tiZANidine (ZANAFLEX) 4 MG tablet TAKE 1 TO 2 TABLETS BY MOUTH THREE TIMES DAILY AS NEEDED (Patient taking differently: 16 mg Daily. Takes in evening) 360 tablet 0   • venlafaxine XR (EFFEXOR-XR) 150 MG 24 hr capsule Take 150 mg by mouth Daily.     • vitamin B-12 (CYANOCOBALAMIN) 2500 MCG sublingual tablet tablet Place 5,000 mcg under the tongue Daily.     • fenofibrate (TRICOR) 145 MG tablet Take 1 tablet by mouth Daily. (Patient taking differently: Take 145 mg by mouth Daily. Takes in the evening) 90 tablet 1   • traZODone (DESYREL) 50 MG tablet TAKE 1  "TO 2 TABLETS BY MOUTH EVERY DAY AT BEDTIME AS NEEDED 180 tablet 0   • EPINEPHrine (EPIPEN) 0.3 MG/0.3ML solution auto-injector injection Use as directed 1 each 2   • atorvastatin (LIPITOR) 40 MG tablet Take 1 tablet by mouth Every Night. 30 tablet 3   • isosorbide mononitrate (IMDUR) 30 MG 24 hr tablet Take 1 tablet by mouth Daily. 30 tablet 3   • SUMAtriptan (IMITREX) 50 MG tablet Take 50 mg by mouth As Needed for Migraine. Take one tablet at onset of headache. May repeat dose one time in 2 hours if headache not relieved.       No facility-administered medications prior to visit.       Review of Systems   Constitutional: Negative for chills, fatigue and fever.   HENT: Negative for congestion and sore throat.    Respiratory: Negative for cough and shortness of breath.    Cardiovascular: Negative for chest pain and palpitations.   Gastrointestinal: Negative for abdominal pain, nausea and vomiting.   Musculoskeletal: Negative for arthralgias, joint swelling, myalgias and neck pain.   Skin: Negative for rash.   Neurological: Positive for numbness. Negative for weakness and headaches.   Psychiatric/Behavioral: The patient has insomnia.      I have reviewed 12 systems with patient. Findings were negative except what is noted below and/or in history of present illness.    Objective     Visit Vitals  /70 (BP Location: Left arm)   Pulse 62   Ht 177.8 cm (70\")   Wt 89.4 kg (197 lb)   SpO2 99%   BMI 28.27 kg/m²     Physical Exam  Constitutional:       General: He is not in acute distress.     Appearance: He is well-developed.   HENT:      Head: Normocephalic and atraumatic.      Nose: Nose normal.   Eyes:      General:         Right eye: No discharge.         Left eye: No discharge.      Conjunctiva/sclera: Conjunctivae normal.      Pupils: Pupils are equal, round, and reactive to light.   Neck:      Thyroid: No thyromegaly.   Cardiovascular:      Rate and Rhythm: Normal rate and regular rhythm.      Heart sounds: " Normal heart sounds. No murmur heard.      Pulmonary:      Effort: Pulmonary effort is normal. No respiratory distress.      Breath sounds: Normal breath sounds. No wheezing or rales.   Chest:      Chest wall: No tenderness.   Abdominal:      General: Bowel sounds are normal. There is no distension.      Palpations: Abdomen is soft. There is no mass.      Tenderness: There is no abdominal tenderness.      Hernia: No hernia is present.   Musculoskeletal:         General: No deformity. Normal range of motion.      Cervical back: Normal range of motion.   Lymphadenopathy:      Cervical: No cervical adenopathy.   Skin:     General: Skin is warm and dry.      Coloration: Skin is not pale.      Findings: No rash.   Neurological:      Mental Status: He is alert and oriented to person, place, and time.      Deep Tendon Reflexes: Reflexes are normal and symmetric.   Psychiatric:         Behavior: Behavior normal.         Thought Content: Thought content normal.         Judgment: Judgment normal.       Results for orders placed or performed in visit on 11/04/21   Urinalysis without microscopic (no culture) - Urine, Clean Catch    Specimen: Urine, Clean Catch   Result Value Ref Range    Color, UA Yellow Yellow, Straw    Appearance, UA Clear Clear    pH, UA 7.5 5.0 - 8.0    Specific Gravity, UA 1.007 1.005 - 1.030    Glucose, UA Negative Negative    Ketones, UA Negative Negative    Bilirubin, UA Negative Negative    Blood, UA Negative Negative    Protein, UA Negative Negative    Leuk Esterase, UA Negative Negative    Nitrite, UA Negative Negative    Urobilinogen, UA 0.2 E.U./dL 0.2 - 1.0 E.U./dL   Urinalysis, Microscopic Only - Urine, Clean Catch    Specimen: Urine, Clean Catch   Result Value Ref Range    RBC, UA 0-2 None Seen, 0-2 /HPF    WBC, UA None Seen None Seen, 0-2 /HPF    Bacteria, UA None Seen None Seen /HPF    Squamous Epithelial Cells, UA 0-2 None Seen, 0-2 /HPF    Hyaline Casts, UA None Seen None Seen /LPF     Methodology Automated Microscopy    Comprehensive Metabolic Panel    Specimen: Blood   Result Value Ref Range    Glucose 103 (H) 65 - 99 mg/dL    BUN 20 8 - 23 mg/dL    Creatinine 1.15 0.76 - 1.27 mg/dL    Sodium 136 136 - 145 mmol/L    Potassium 3.7 3.5 - 5.2 mmol/L    Chloride 102 98 - 107 mmol/L    CO2 25.4 22.0 - 29.0 mmol/L    Calcium 9.8 8.6 - 10.5 mg/dL    Total Protein 7.1 6.0 - 8.5 g/dL    Albumin 5.00 3.50 - 5.20 g/dL    ALT (SGPT) 46 (H) 1 - 41 U/L    AST (SGOT) 36 1 - 40 U/L    Alkaline Phosphatase 41 39 - 117 U/L    Total Bilirubin 0.6 0.0 - 1.2 mg/dL    eGFR Non African Amer 63 >60 mL/min/1.73    Globulin 2.1 gm/dL    A/G Ratio 2.4 g/dL    BUN/Creatinine Ratio 17.4 7.0 - 25.0    Anion Gap 8.6 5.0 - 15.0 mmol/L   Lipid Panel    Specimen: Blood   Result Value Ref Range    Total Cholesterol 177 0 - 200 mg/dL    Triglycerides 283 (H) 0 - 150 mg/dL    HDL Cholesterol 33 (L) 40 - 60 mg/dL    LDL Cholesterol  96 0 - 100 mg/dL    VLDL Cholesterol 48 (H) 5 - 40 mg/dL    LDL/HDL Ratio 2.65    Hemoglobin A1c    Specimen: Blood   Result Value Ref Range    Hemoglobin A1C 5.30 4.80 - 5.60 %   PSA Screen    Specimen: Blood   Result Value Ref Range    PSA 0.923 0.000 - 4.000 ng/mL   CBC Auto Differential    Specimen: Blood   Result Value Ref Range    WBC 3.03 (L) 3.40 - 10.80 10*3/mm3    RBC 4.35 4.14 - 5.80 10*6/mm3    Hemoglobin 13.9 13.0 - 17.7 g/dL    Hematocrit 39.3 37.5 - 51.0 %    MCV 90.3 79.0 - 97.0 fL    MCH 32.0 26.6 - 33.0 pg    MCHC 35.4 31.5 - 35.7 g/dL    RDW 12.3 12.3 - 15.4 %    RDW-SD 40.1 37.0 - 54.0 fl    MPV 10.1 6.0 - 12.0 fL    Platelets 206 140 - 450 10*3/mm3    Neutrophil % 52.9 42.7 - 76.0 %    Lymphocyte % 34.3 19.6 - 45.3 %    Monocyte % 9.2 5.0 - 12.0 %    Eosinophil % 2.0 0.3 - 6.2 %    Basophil % 1.3 0.0 - 1.5 %    Immature Grans % 0.3 0.0 - 0.5 %    Neutrophils, Absolute 1.60 (L) 1.70 - 7.00 10*3/mm3    Lymphocytes, Absolute 1.04 0.70 - 3.10 10*3/mm3    Monocytes, Absolute 0.28 0.10 -  0.90 10*3/mm3    Eosinophils, Absolute 0.06 0.00 - 0.40 10*3/mm3    Basophils, Absolute 0.04 0.00 - 0.20 10*3/mm3    Immature Grans, Absolute 0.01 0.00 - 0.05 10*3/mm3    nRBC 0.0 0.0 - 0.2 /100 WBC      Notes brought forward are reviewed and updated if indicated.     Assessment/Plan   Problems Addressed this Visit        Cardiac and Vasculature    Mixed hyperlipidemia (Chronic)    Relevant Medications    pitavastatin calcium (LIVALO) 2 MG tablet tablet    fenofibrate (TRICOR) 145 MG tablet    Essential hypertension - Primary (Chronic)       Sleep    Insomnia (Chronic)    Relevant Medications    traZODone (DESYREL) 50 MG tablet      Other Visit Diagnoses     Screening for colon cancer        Relevant Orders    Ambulatory Referral to Gastroenterology      Diagnoses       Codes Comments    Essential hypertension    -  Primary ICD-10-CM: I10  ICD-9-CM: 401.9     Mixed hyperlipidemia     ICD-10-CM: E78.2  ICD-9-CM: 272.2     Other insomnia     ICD-10-CM: G47.09  ICD-9-CM: 780.52     Screening for colon cancer     ICD-10-CM: Z12.11  ICD-9-CM: V76.51           Continue current medications.    Age-appropriate counseling is provided.     New Medications Ordered This Visit   Medications   • fenofibrate (TRICOR) 145 MG tablet     Sig: Take 1 tablet by mouth Every Night. Takes in the evening     Dispense:  90 tablet     Refill:  3   • traZODone (DESYREL) 50 MG tablet     Sig: TAKE 1 TO 2 TABLETS BY MOUTH EVERY DAY AT BEDTIME AS NEEDED     Dispense:  180 tablet     Refill:  1     Return in about 7 months (around 6/22/2022) for medicare wellness visit.        This document has been electronically signed by Melvi Hernandez MD on November 12, 2021 08:33 CST

## 2022-01-03 RX ORDER — CLOPIDOGREL BISULFATE 75 MG/1
TABLET ORAL
Qty: 30 TABLET | Refills: 3 | Status: SHIPPED | OUTPATIENT
Start: 2022-01-03 | End: 2022-04-05 | Stop reason: SDUPTHER

## 2022-02-07 RX ORDER — CYCLOBENZAPRINE HCL 10 MG
10 TABLET ORAL 3 TIMES DAILY PRN
Qty: 30 TABLET | Refills: 1 | Status: SHIPPED | OUTPATIENT
Start: 2022-02-07 | End: 2022-08-05 | Stop reason: ALTCHOICE

## 2022-04-05 RX ORDER — CLOPIDOGREL BISULFATE 75 MG/1
75 TABLET ORAL DAILY
Qty: 30 TABLET | Refills: 3 | Status: SHIPPED | OUTPATIENT
Start: 2022-04-05 | End: 2022-04-28 | Stop reason: SDUPTHER

## 2022-04-28 RX ORDER — CLOPIDOGREL BISULFATE 75 MG/1
75 TABLET ORAL DAILY
Qty: 30 TABLET | Refills: 3 | Status: SHIPPED | OUTPATIENT
Start: 2022-04-28 | End: 2022-08-24 | Stop reason: SDUPTHER

## 2022-06-20 PROCEDURE — 87635 SARS-COV-2 COVID-19 AMP PRB: CPT | Performed by: NURSE PRACTITIONER

## 2022-06-22 ENCOUNTER — APPOINTMENT (OUTPATIENT)
Dept: GENERAL RADIOLOGY | Facility: HOSPITAL | Age: 67
End: 2022-06-22

## 2022-06-22 ENCOUNTER — HOSPITAL ENCOUNTER (EMERGENCY)
Facility: HOSPITAL | Age: 67
Discharge: HOME | End: 2022-06-22
Attending: STUDENT IN AN ORGANIZED HEALTH CARE EDUCATION/TRAINING PROGRAM | Admitting: STUDENT IN AN ORGANIZED HEALTH CARE EDUCATION/TRAINING PROGRAM

## 2022-06-22 ENCOUNTER — OFFICE VISIT (OUTPATIENT)
Dept: FAMILY MEDICINE CLINIC | Facility: CLINIC | Age: 67
End: 2022-06-22

## 2022-06-22 ENCOUNTER — APPOINTMENT (OUTPATIENT)
Dept: CT IMAGING | Facility: HOSPITAL | Age: 67
End: 2022-06-22

## 2022-06-22 ENCOUNTER — APPOINTMENT (OUTPATIENT)
Dept: ULTRASOUND IMAGING | Facility: HOSPITAL | Age: 67
End: 2022-06-22

## 2022-06-22 ENCOUNTER — LAB (OUTPATIENT)
Dept: LAB | Facility: HOSPITAL | Age: 67
End: 2022-06-22

## 2022-06-22 VITALS
DIASTOLIC BLOOD PRESSURE: 78 MMHG | HEART RATE: 76 BPM | WEIGHT: 194 LBS | TEMPERATURE: 99.1 F | HEIGHT: 70 IN | SYSTOLIC BLOOD PRESSURE: 126 MMHG | BODY MASS INDEX: 27.77 KG/M2 | RESPIRATION RATE: 18 BRPM | OXYGEN SATURATION: 100 %

## 2022-06-22 VITALS
DIASTOLIC BLOOD PRESSURE: 65 MMHG | TEMPERATURE: 100 F | BODY MASS INDEX: 27.44 KG/M2 | HEIGHT: 71 IN | OXYGEN SATURATION: 96 % | WEIGHT: 196 LBS | RESPIRATION RATE: 18 BRPM | SYSTOLIC BLOOD PRESSURE: 116 MMHG | HEART RATE: 82 BPM

## 2022-06-22 DIAGNOSIS — R74.8 ELEVATED LIVER ENZYMES: ICD-10-CM

## 2022-06-22 DIAGNOSIS — R10.11 RUQ PAIN: Primary | ICD-10-CM

## 2022-06-22 DIAGNOSIS — K59.00 CONSTIPATION, UNSPECIFIED CONSTIPATION TYPE: ICD-10-CM

## 2022-06-22 DIAGNOSIS — J06.9 UPPER RESPIRATORY TRACT INFECTION, UNSPECIFIED TYPE: Primary | ICD-10-CM

## 2022-06-22 DIAGNOSIS — R10.9 ABDOMINAL PAIN, UNSPECIFIED ABDOMINAL LOCATION: Primary | ICD-10-CM

## 2022-06-22 DIAGNOSIS — Z20.822 CLOSE EXPOSURE TO COVID-19 VIRUS: ICD-10-CM

## 2022-06-22 DIAGNOSIS — R17 ELEVATED BILIRUBIN: ICD-10-CM

## 2022-06-22 DIAGNOSIS — Z20.822 EXPOSURE TO CONFIRMED CASE OF SEVERE ACUTE RESPIRATORY SYNDROME CORONAVIRUS 2 (SARS-COV-2) INFECTION: ICD-10-CM

## 2022-06-22 DIAGNOSIS — R10.11 RUQ PAIN: ICD-10-CM

## 2022-06-22 LAB
ALBUMIN SERPL-MCNC: 3.8 G/DL (ref 3.5–5.2)
ALBUMIN SERPL-MCNC: 4 G/DL (ref 3.5–5.2)
ALBUMIN/GLOB SERPL: 1.2 G/DL
ALBUMIN/GLOB SERPL: 1.3 G/DL
ALP SERPL-CCNC: 79 U/L (ref 39–117)
ALP SERPL-CCNC: 85 U/L (ref 39–117)
ALT SERPL W P-5'-P-CCNC: 201 U/L (ref 1–41)
ALT SERPL W P-5'-P-CCNC: 214 U/L (ref 1–41)
ANION GAP SERPL CALCULATED.3IONS-SCNC: 11 MMOL/L (ref 5–15)
ANION GAP SERPL CALCULATED.3IONS-SCNC: 11 MMOL/L (ref 5–15)
AST SERPL-CCNC: 121 U/L (ref 1–40)
AST SERPL-CCNC: 88 U/L (ref 1–40)
BACTERIA UR QL AUTO: ABNORMAL /HPF
BASOPHILS # BLD AUTO: 0.02 10*3/MM3 (ref 0–0.2)
BASOPHILS # BLD AUTO: 0.02 10*3/MM3 (ref 0–0.2)
BASOPHILS NFR BLD AUTO: 0.3 % (ref 0–1.5)
BASOPHILS NFR BLD AUTO: 0.4 % (ref 0–1.5)
BILIRUB SERPL-MCNC: 3.1 MG/DL (ref 0–1.2)
BILIRUB SERPL-MCNC: 3.1 MG/DL (ref 0–1.2)
BILIRUB UR QL STRIP: ABNORMAL
BUN SERPL-MCNC: 20 MG/DL (ref 8–23)
BUN SERPL-MCNC: 22 MG/DL (ref 8–23)
BUN/CREAT SERPL: 14.2 (ref 7–25)
BUN/CREAT SERPL: 15.2 (ref 7–25)
CALCIUM SPEC-SCNC: 10 MG/DL (ref 8.6–10.5)
CALCIUM SPEC-SCNC: 10.4 MG/DL (ref 8.6–10.5)
CHLORIDE SERPL-SCNC: 100 MMOL/L (ref 98–107)
CHLORIDE SERPL-SCNC: 99 MMOL/L (ref 98–107)
CLARITY UR: ABNORMAL
CO2 SERPL-SCNC: 22 MMOL/L (ref 22–29)
CO2 SERPL-SCNC: 25 MMOL/L (ref 22–29)
COLOR UR: ABNORMAL
CREAT SERPL-MCNC: 1.32 MG/DL (ref 0.76–1.27)
CREAT SERPL-MCNC: 1.55 MG/DL (ref 0.76–1.27)
DEPRECATED RDW RBC AUTO: 41 FL (ref 37–54)
DEPRECATED RDW RBC AUTO: 42.4 FL (ref 37–54)
EGFRCR SERPLBLD CKD-EPI 2021: 48.5 ML/MIN/1.73
EGFRCR SERPLBLD CKD-EPI 2021: 58.8 ML/MIN/1.73
EOSINOPHIL # BLD AUTO: 0.03 10*3/MM3 (ref 0–0.4)
EOSINOPHIL # BLD AUTO: 0.05 10*3/MM3 (ref 0–0.4)
EOSINOPHIL NFR BLD AUTO: 0.6 % (ref 0.3–6.2)
EOSINOPHIL NFR BLD AUTO: 0.8 % (ref 0.3–6.2)
ERYTHROCYTE [DISTWIDTH] IN BLOOD BY AUTOMATED COUNT: 12.8 % (ref 12.3–15.4)
ERYTHROCYTE [DISTWIDTH] IN BLOOD BY AUTOMATED COUNT: 12.9 % (ref 12.3–15.4)
EXPIRATION DATE: NORMAL
FLUAV SUBTYP SPEC NAA+PROBE: NOT DETECTED
FLUBV RNA ISLT QL NAA+PROBE: NOT DETECTED
GLOBULIN UR ELPH-MCNC: 3 GM/DL
GLOBULIN UR ELPH-MCNC: 3.2 GM/DL
GLUCOSE SERPL-MCNC: 118 MG/DL (ref 65–99)
GLUCOSE SERPL-MCNC: 136 MG/DL (ref 65–99)
GLUCOSE UR STRIP-MCNC: NEGATIVE MG/DL
HCT VFR BLD AUTO: 34.9 % (ref 37.5–51)
HCT VFR BLD AUTO: 36.9 % (ref 37.5–51)
HGB BLD-MCNC: 12.6 G/DL (ref 13–17.7)
HGB BLD-MCNC: 13.4 G/DL (ref 13–17.7)
HGB UR QL STRIP.AUTO: NEGATIVE
HOLD SPECIMEN: NORMAL
HOLD SPECIMEN: NORMAL
HYALINE CASTS UR QL AUTO: ABNORMAL /LPF
IMM GRANULOCYTES # BLD AUTO: 0.02 10*3/MM3 (ref 0–0.05)
IMM GRANULOCYTES # BLD AUTO: 0.03 10*3/MM3 (ref 0–0.05)
IMM GRANULOCYTES NFR BLD AUTO: 0.3 % (ref 0–0.5)
IMM GRANULOCYTES NFR BLD AUTO: 0.6 % (ref 0–0.5)
INTERNAL CONTROL: NORMAL
KETONES UR QL STRIP: ABNORMAL
LEUKOCYTE ESTERASE UR QL STRIP.AUTO: ABNORMAL
LIPASE SERPL-CCNC: 37 U/L (ref 13–60)
LYMPHOCYTES # BLD AUTO: 0.46 10*3/MM3 (ref 0.7–3.1)
LYMPHOCYTES # BLD AUTO: 0.57 10*3/MM3 (ref 0.7–3.1)
LYMPHOCYTES NFR BLD AUTO: 11.5 % (ref 19.6–45.3)
LYMPHOCYTES NFR BLD AUTO: 7.6 % (ref 19.6–45.3)
Lab: NORMAL
MCH RBC QN AUTO: 31.6 PG (ref 26.6–33)
MCH RBC QN AUTO: 32.6 PG (ref 26.6–33)
MCHC RBC AUTO-ENTMCNC: 36.1 G/DL (ref 31.5–35.7)
MCHC RBC AUTO-ENTMCNC: 36.3 G/DL (ref 31.5–35.7)
MCV RBC AUTO: 87.5 FL (ref 79–97)
MCV RBC AUTO: 89.8 FL (ref 79–97)
MONOCYTES # BLD AUTO: 0.23 10*3/MM3 (ref 0.1–0.9)
MONOCYTES # BLD AUTO: 0.31 10*3/MM3 (ref 0.1–0.9)
MONOCYTES NFR BLD AUTO: 4.7 % (ref 5–12)
MONOCYTES NFR BLD AUTO: 5.1 % (ref 5–12)
NEUTROPHILS NFR BLD AUTO: 4.06 10*3/MM3 (ref 1.7–7)
NEUTROPHILS NFR BLD AUTO: 5.16 10*3/MM3 (ref 1.7–7)
NEUTROPHILS NFR BLD AUTO: 82.2 % (ref 42.7–76)
NEUTROPHILS NFR BLD AUTO: 85.9 % (ref 42.7–76)
NITRITE UR QL STRIP: NEGATIVE
NRBC BLD AUTO-RTO: 0 /100 WBC (ref 0–0.2)
NRBC BLD AUTO-RTO: 0 /100 WBC (ref 0–0.2)
PH UR STRIP.AUTO: 5.5 [PH] (ref 5–9)
PLATELET # BLD AUTO: 138 10*3/MM3 (ref 140–450)
PLATELET # BLD AUTO: 141 10*3/MM3 (ref 140–450)
PMV BLD AUTO: 10.5 FL (ref 6–12)
PMV BLD AUTO: 9.9 FL (ref 6–12)
POTASSIUM SERPL-SCNC: 3.6 MMOL/L (ref 3.5–5.2)
POTASSIUM SERPL-SCNC: 3.9 MMOL/L (ref 3.5–5.2)
PROT SERPL-MCNC: 7 G/DL (ref 6–8.5)
PROT SERPL-MCNC: 7 G/DL (ref 6–8.5)
PROT UR QL STRIP: ABNORMAL
RBC # BLD AUTO: 3.99 10*6/MM3 (ref 4.14–5.8)
RBC # BLD AUTO: 4.11 10*6/MM3 (ref 4.14–5.8)
RBC # UR STRIP: ABNORMAL /HPF
REF LAB TEST METHOD: ABNORMAL
SARS-COV-2 AG UPPER RESP QL IA.RAPID: NOT DETECTED
SARS-COV-2 RNA PNL SPEC NAA+PROBE: NOT DETECTED
SODIUM SERPL-SCNC: 133 MMOL/L (ref 136–145)
SODIUM SERPL-SCNC: 135 MMOL/L (ref 136–145)
SP GR UR STRIP: 1.02 (ref 1–1.03)
SQUAMOUS #/AREA URNS HPF: ABNORMAL /HPF
TROPONIN T SERPL-MCNC: <0.01 NG/ML (ref 0–0.03)
UROBILINOGEN UR QL STRIP: ABNORMAL
WBC # UR STRIP: ABNORMAL /HPF
WBC NRBC COR # BLD: 4.94 10*3/MM3 (ref 3.4–10.8)
WBC NRBC COR # BLD: 6.02 10*3/MM3 (ref 3.4–10.8)
WHOLE BLOOD HOLD COAG: NORMAL
WHOLE BLOOD HOLD SPECIMEN: NORMAL

## 2022-06-22 PROCEDURE — 83690 ASSAY OF LIPASE: CPT

## 2022-06-22 PROCEDURE — 25010000002 MORPHINE PER 10 MG: Performed by: STUDENT IN AN ORGANIZED HEALTH CARE EDUCATION/TRAINING PROGRAM

## 2022-06-22 PROCEDURE — 85025 COMPLETE CBC W/AUTO DIFF WBC: CPT | Performed by: GENERAL PRACTICE

## 2022-06-22 PROCEDURE — 85025 COMPLETE CBC W/AUTO DIFF WBC: CPT

## 2022-06-22 PROCEDURE — 80053 COMPREHEN METABOLIC PANEL: CPT | Performed by: GENERAL PRACTICE

## 2022-06-22 PROCEDURE — 81001 URINALYSIS AUTO W/SCOPE: CPT | Performed by: STUDENT IN AN ORGANIZED HEALTH CARE EDUCATION/TRAINING PROGRAM

## 2022-06-22 PROCEDURE — 84484 ASSAY OF TROPONIN QUANT: CPT

## 2022-06-22 PROCEDURE — 71046 X-RAY EXAM CHEST 2 VIEWS: CPT

## 2022-06-22 PROCEDURE — 99284 EMERGENCY DEPT VISIT MOD MDM: CPT

## 2022-06-22 PROCEDURE — 25010000002 IOPAMIDOL 61 % SOLUTION: Performed by: STUDENT IN AN ORGANIZED HEALTH CARE EDUCATION/TRAINING PROGRAM

## 2022-06-22 PROCEDURE — 25010000002 ONDANSETRON PER 1 MG: Performed by: STUDENT IN AN ORGANIZED HEALTH CARE EDUCATION/TRAINING PROGRAM

## 2022-06-22 PROCEDURE — 87426 SARSCOV CORONAVIRUS AG IA: CPT | Performed by: GENERAL PRACTICE

## 2022-06-22 PROCEDURE — 80074 ACUTE HEPATITIS PANEL: CPT | Performed by: GENERAL PRACTICE

## 2022-06-22 PROCEDURE — 36415 COLL VENOUS BLD VENIPUNCTURE: CPT | Performed by: GENERAL PRACTICE

## 2022-06-22 PROCEDURE — 96374 THER/PROPH/DIAG INJ IV PUSH: CPT

## 2022-06-22 PROCEDURE — 74177 CT ABD & PELVIS W/CONTRAST: CPT

## 2022-06-22 PROCEDURE — 76705 ECHO EXAM OF ABDOMEN: CPT

## 2022-06-22 PROCEDURE — 80053 COMPREHEN METABOLIC PANEL: CPT

## 2022-06-22 PROCEDURE — 99214 OFFICE O/P EST MOD 30 MIN: CPT | Performed by: GENERAL PRACTICE

## 2022-06-22 PROCEDURE — 87636 SARSCOV2 & INF A&B AMP PRB: CPT

## 2022-06-22 PROCEDURE — 96375 TX/PRO/DX INJ NEW DRUG ADDON: CPT

## 2022-06-22 RX ORDER — POLYETHYLENE GLYCOL 3350 17 G/17G
17 POWDER, FOR SOLUTION ORAL DAILY
Qty: 116 G | Refills: 0 | Status: SHIPPED | OUTPATIENT
Start: 2022-06-22 | End: 2022-08-05

## 2022-06-22 RX ORDER — PRAVASTATIN SODIUM 20 MG
TABLET ORAL
COMMUNITY
Start: 2022-06-13 | End: 2023-02-27 | Stop reason: SDUPTHER

## 2022-06-22 RX ORDER — SODIUM CHLORIDE 0.9 % (FLUSH) 0.9 %
10 SYRINGE (ML) INJECTION AS NEEDED
Status: DISCONTINUED | OUTPATIENT
Start: 2022-06-22 | End: 2022-06-23 | Stop reason: HOSPADM

## 2022-06-22 RX ORDER — ONDANSETRON 2 MG/ML
4 INJECTION INTRAMUSCULAR; INTRAVENOUS ONCE
Status: COMPLETED | OUTPATIENT
Start: 2022-06-22 | End: 2022-06-22

## 2022-06-22 RX ADMIN — MORPHINE SULFATE 4 MG: 4 INJECTION, SOLUTION INTRAMUSCULAR; INTRAVENOUS at 18:52

## 2022-06-22 RX ADMIN — SODIUM CHLORIDE, POTASSIUM CHLORIDE, SODIUM LACTATE AND CALCIUM CHLORIDE 1000 ML: 600; 310; 30; 20 INJECTION, SOLUTION INTRAVENOUS at 18:52

## 2022-06-22 RX ADMIN — ONDANSETRON 4 MG: 2 INJECTION INTRAMUSCULAR; INTRAVENOUS at 18:52

## 2022-06-22 RX ADMIN — IOPAMIDOL 90 ML: 612 INJECTION, SOLUTION INTRAVENOUS at 19:07

## 2022-06-22 NOTE — PROGRESS NOTES
Subjective   Ben Abebe is a 67 y.o. male.   Chief Complaint   Patient presents with   • Headache   • Abdominal Pain   • Fatigue     In contact with covid daughter has it.     Exposed to Covid 3 days ago, [facility] and negative flu and Covid tests. Symptoms are classic including altered taste and smell. Is also having RUQ pain and has had diarrhea for last 3 weeks although now hasn't had a bowel movements for 2 days.   URI   This is a new problem. The current episode started in the past 7 days. The maximum temperature recorded prior to his arrival was 101 - 101.9 F. Associated symptoms include abdominal pain, headaches, nausea, rhinorrhea and vomiting. Associated symptoms comments: Myalgias, chills, anorexia, bad taste. He has tried NSAIDs for the symptoms. The treatment provided moderate relief.      The following portions of the patient's history were reviewed and updated as appropriate: allergies, current medications, past social history and problem list.    Outpatient Medications Prior to Visit   Medication Sig Dispense Refill   • Acetaminophen (TYLENOL EXTRA STRENGTH PO) Take 1 tablet by mouth As Needed.     • amLODIPine (NORVASC) 10 MG tablet Take 1 tablet by mouth Daily. 90 tablet 3   • aspirin 81 MG EC tablet Take  by mouth.     • clopidogrel (PLAVIX) 75 MG tablet Take 1 tablet by mouth Daily. 30 tablet 3   • cyclobenzaprine (FLEXERIL) 10 MG tablet Take 1 tablet by mouth 3 (Three) Times a Day As Needed for Muscle Spasms. 30 tablet 1   • dicyclomine (BENTYL) 20 MG tablet Take 1 tablet by mouth 4 (Four) Times a Day As Needed (bowel problem). TAKE 1 q 6-8 HOURS AS NEEDED FOR BOWEL PROBLEM 60 tablet 1   • EPINEPHrine (EPIPEN) 0.3 MG/0.3ML solution auto-injector injection Use as directed 1 each 2   • fenofibrate (TRICOR) 145 MG tablet Take 1 tablet by mouth Every Night. Takes in the evening 90 tablet 3   • fexofenadine (ALLEGRA) 180 MG tablet Take 180 mg by mouth Daily.     •  "fluticasone (FLONASE) 50 MCG/ACT nasal spray 2 sprays into the nostril(s) as directed by provider Daily. 1 bottle 3   • folic acid (FOLVITE) 1 MG tablet Take 1 mg by mouth Daily. Takes in evening     • irbesartan-hydrochlorothiazide (AVALIDE) 300-12.5 MG tablet Take 1 tablet by mouth Daily. 90 tablet 3   • magnesium gluconate (MAGONATE) 500 MG tablet Take 500 mg by mouth Daily. Takes in evening     • metoprolol succinate XL (Toprol XL) 50 MG 24 hr tablet Take 1 tablet by mouth Daily. (Patient taking differently: Take 50 mg by mouth Daily. Takes in evening) 90 tablet 3   • Multiple Vitamins-Minerals (CENTRUM SILVER ULTRA MENS PO) Take 1 tablet by mouth Daily.     • pravastatin (PRAVACHOL) 20 MG tablet      • traZODone (DESYREL) 50 MG tablet TAKE 1 TO 2 TABLETS BY MOUTH EVERY DAY AT BEDTIME AS NEEDED 180 tablet 1   • venlafaxine XR (EFFEXOR-XR) 150 MG 24 hr capsule Take 150 mg by mouth Daily.     • vitamin B-12 (CYANOCOBALAMIN) 2500 MCG sublingual tablet tablet Place 5,000 mcg under the tongue Daily.     • pitavastatin calcium (LIVALO) 2 MG tablet tablet Take 2 mg by mouth Every Night.       No facility-administered medications prior to visit.       Review of Systems   HENT: Positive for rhinorrhea.    Gastrointestinal: Positive for abdominal pain, nausea and vomiting.   Neurological: Positive for headaches.     I have reviewed 12 systems with patient. Findings were negative except what is noted below and/or in history of present illness.     Objective   Visit Vitals  /78 (BP Location: Left arm, Patient Position: Sitting, Cuff Size: Large Adult)   Pulse 76   Temp 99.1 °F (37.3 °C) (Tympanic)   Resp 18   Ht 177.8 cm (70\")   Wt 88 kg (194 lb)   SpO2 100%   BMI 27.84 kg/m²     Physical Exam  Vitals and nursing note reviewed.   Constitutional:       General: He is not in acute distress.     Appearance: He is well-developed.   HENT:      Head: Normocephalic and atraumatic.      Nose: Nose normal.   Eyes:      " General:         Right eye: No discharge.         Left eye: No discharge.      Conjunctiva/sclera: Conjunctivae normal.      Pupils: Pupils are equal, round, and reactive to light.   Neck:      Thyroid: No thyromegaly.   Cardiovascular:      Rate and Rhythm: Normal rate and regular rhythm.      Heart sounds: Normal heart sounds. No murmur heard.  Pulmonary:      Effort: Pulmonary effort is normal. No respiratory distress.      Breath sounds: Normal breath sounds. No wheezing or rales.   Chest:      Chest wall: No tenderness.   Abdominal:      General: Bowel sounds are normal. There is no distension.      Palpations: Abdomen is soft. There is no mass.      Tenderness: There is abdominal tenderness in the right upper quadrant. There is guarding. There is no rebound.      Hernia: No hernia is present.   Musculoskeletal:         General: No deformity. Normal range of motion.      Cervical back: Normal range of motion.   Lymphadenopathy:      Cervical: No cervical adenopathy.   Skin:     General: Skin is warm and dry.      Coloration: Skin is not pale.      Findings: No rash.   Neurological:      Mental Status: He is alert and oriented to person, place, and time.      Deep Tendon Reflexes: Reflexes are normal and symmetric.   Psychiatric:         Behavior: Behavior normal.         Thought Content: Thought content normal.         Judgment: Judgment normal.       Notes brought forward are reviewed and updated if indicated.     Assessment & Plan   Problems Addressed this Visit    None     Visit Diagnoses     Upper respiratory tract infection, unspecified type    -  Primary    Relevant Orders    CBC & Differential (Completed)    Comprehensive Metabolic Panel (Completed)    RUQ pain        Exposure to confirmed case of severe acute respiratory syndrome coronavirus 2 (SARS-CoV-2) infection        Relevant Orders    POCT SARS-CoV-2 Antigen YADI (Completed)    Close exposure to COVID-19 virus          Diagnoses       Codes  Comments    Upper respiratory tract infection, unspecified type    -  Primary ICD-10-CM: J06.9  ICD-9-CM: 465.9     RUQ pain     ICD-10-CM: R10.11  ICD-9-CM: 789.01     Exposure to confirmed case of severe acute respiratory syndrome coronavirus 2 (SARS-CoV-2) infection     ICD-10-CM: Z20.822  ICD-9-CM: V01.79     Close exposure to COVID-19 virus     ICD-10-CM: Z20.822  ICD-9-CM: V01.79          Will notify regarding results. Rule out viral hepatitis. Symptomatic treatment. Fever control, fluids, rest. Recheck if not improving.      No orders of the defined types were placed in this encounter.    Return in about 26 days (around 7/18/2022) for medicare wellness visit.        This document has been electronically signed by Melvi Hernnadez MD on June 22, 2022 14:51 CDT

## 2022-06-22 NOTE — ED PROVIDER NOTES
Subjective   67-year-old male comes to the ER with a few day history of worsening abdominal pain.  He is developed nausea and vomiting.  Patient thought it might be due to having the coronavirus, but he reports testing negative at the urgent care.  He has tried taking a muscle relaxant, but has not helped.  Patient has a history of having 2 bowel resections.      History provided by:  Patient   used: No        Review of Systems   Constitutional: Positive for activity change, appetite change and fatigue. Negative for chills and fever.   HENT: Negative for drooling.    Eyes: Negative for redness.   Respiratory: Negative for cough, chest tightness, shortness of breath and wheezing.    Cardiovascular: Negative for chest pain and palpitations.   Gastrointestinal: Positive for abdominal pain, nausea and vomiting.   Genitourinary: Negative for flank pain.   Skin: Negative for color change.   Neurological: Negative for seizures.   Psychiatric/Behavioral: Negative for confusion.       Past Medical History:   Diagnosis Date   • Anxiety    • Benign prostatic hyperplasia    • Cobalamin deficiency    • Depression    • Diverticular disease of colon    • Hypercholesterolemia    • Hypertension    • Insomnia    • Irritable bowel syndrome    • Migraine    • Osteoarthritis    • Peripheral neuropathy    • Restless legs        Allergies   Allergen Reactions   • Lyrica [Pregabalin] Other (See Comments)     Causes blisters to form on skin   • Ambien [Zolpidem] Hallucinations   • Bee Venom Swelling     Extreme Swelling   • Codeine Nausea And Vomiting       Past Surgical History:   Procedure Laterality Date   • CARDIAC CATHETERIZATION Left 8/6/2021    Procedure: Left Heart Cath;  Surgeon: Lan Wiley MD;  Location: [facility] CATH INVASIVE LOCATION;  Service: Cardiology;  Laterality: Left;   • COLECTOMY PARTIAL / TOTAL  11/2009   • LUMBAR DISC SURGERY  2000       Family History   Problem Relation Age of Onset   •  Coronary artery disease Father    • Diabetes Father    • Hypertension Father    • Diabetes Mother    • Hypertension Mother    • Stroke Mother    • Cancer Sister         skin   • Cancer Brother         skin       Social History     Socioeconomic History   • Marital status:    Tobacco Use   • Smoking status: Never Smoker   • Smokeless tobacco: Never Used   Vaping Use   • Vaping Use: Never used   Substance and Sexual Activity   • Alcohol use: Yes   • Sexual activity: Not Currently           Objective    Vitals:    06/22/22 1955 06/22/22 2052 06/22/22 2145 06/22/22 2217   BP: 128/71 151/76 130/62 116/65   BP Location: Left arm Left arm  Left arm   Patient Position: Lying Lying  Lying   Pulse: 81 78 78 82   Resp: 18 18  18   Temp:       TempSrc:       SpO2: 97% 97% 96% 96%   Weight:       Height:           Physical Exam  Vitals and nursing note reviewed.   Constitutional:       General: He is not in acute distress.     Appearance: He is well-developed. He is obese. He is ill-appearing. He is not toxic-appearing or diaphoretic.   HENT:      Head: Normocephalic.      Right Ear: External ear normal.      Left Ear: External ear normal.      Nose: No congestion or rhinorrhea.   Eyes:      General: No scleral icterus.     Conjunctiva/sclera: Conjunctivae normal.   Pulmonary:      Effort: Pulmonary effort is normal. No accessory muscle usage or respiratory distress.      Breath sounds: No wheezing.   Chest:      Chest wall: No tenderness.   Abdominal:      General: Bowel sounds are normal.      Palpations: Abdomen is soft.      Tenderness: There is abdominal tenderness (deep palpation). There is no right CVA tenderness, left CVA tenderness, guarding or rebound.   Musculoskeletal:      Right lower leg: No edema.      Left lower leg: No edema.   Skin:     General: Skin is warm.      Capillary Refill: Capillary refill takes less than 2 seconds.   Neurological:      Mental Status: He is alert and oriented to person, place,  and time.   Psychiatric:         Behavior: Behavior normal.         Procedures           ED Course      Results for orders placed or performed during the hospital encounter of 06/22/22   COVID-19 and FLU A/B PCR - Swab, Nasopharynx    Specimen: Nasopharynx; Swab   Result Value Ref Range    COVID19 Not Detected Not Detected - Ref. Range    Influenza A PCR Not Detected Not Detected    Influenza B PCR Not Detected Not Detected   Comprehensive Metabolic Panel    Specimen: Blood   Result Value Ref Range    Glucose 136 (H) 65 - 99 mg/dL    BUN 20 8 - 23 mg/dL    Creatinine 1.32 (H) 0.76 - 1.27 mg/dL    Sodium 133 (L) 136 - 145 mmol/L    Potassium 3.6 3.5 - 5.2 mmol/L    Chloride 100 98 - 107 mmol/L    CO2 22.0 22.0 - 29.0 mmol/L    Calcium 10.4 8.6 - 10.5 mg/dL    Total Protein 7.0 6.0 - 8.5 g/dL    Albumin 3.80 3.50 - 5.20 g/dL    ALT (SGPT) 201 (H) 1 - 41 U/L    AST (SGOT) 88 (H) 1 - 40 U/L    Alkaline Phosphatase 85 39 - 117 U/L    Total Bilirubin 3.1 (H) 0.0 - 1.2 mg/dL    Globulin 3.2 gm/dL    A/G Ratio 1.2 g/dL    BUN/Creatinine Ratio 15.2 7.0 - 25.0    Anion Gap 11.0 5.0 - 15.0 mmol/L    eGFR 58.8 (L) >60.0 mL/min/1.73   Lipase    Specimen: Blood   Result Value Ref Range    Lipase 37 13 - 60 U/L   Troponin    Specimen: Blood   Result Value Ref Range    Troponin T <0.010 0.000 - 0.030 ng/mL   Urinalysis With Microscopic If Indicated (No Culture) - Urine, Clean Catch    Specimen: Urine, Clean Catch   Result Value Ref Range    Color, UA Dark Yellow Yellow, Straw, Dark Yellow, Keyonna    Appearance, UA Cloudy (A) Clear    pH, UA 5.5 5.0 - 9.0    Specific Gravity, UA 1.021 1.003 - 1.030    Glucose, UA Negative Negative    Ketones, UA Trace (A) Negative    Bilirubin, UA Small (1+) (A) Negative    Blood, UA Negative Negative    Protein, UA 30 mg/dL (1+) (A) Negative    Leuk Esterase, UA Trace (A) Negative    Nitrite, UA Negative Negative    Urobilinogen, UA 1.0 E.U./dL 0.2 - 1.0 E.U./dL   CBC Auto Differential    Specimen:  Blood   Result Value Ref Range    WBC 4.94 3.40 - 10.80 10*3/mm3    RBC 3.99 (L) 4.14 - 5.80 10*6/mm3    Hemoglobin 12.6 (L) 13.0 - 17.7 g/dL    Hematocrit 34.9 (L) 37.5 - 51.0 %    MCV 87.5 79.0 - 97.0 fL    MCH 31.6 26.6 - 33.0 pg    MCHC 36.1 (H) 31.5 - 35.7 g/dL    RDW 12.8 12.3 - 15.4 %    RDW-SD 41.0 37.0 - 54.0 fl    MPV 9.9 6.0 - 12.0 fL    Platelets 141 140 - 450 10*3/mm3    Neutrophil % 82.2 (H) 42.7 - 76.0 %    Lymphocyte % 11.5 (L) 19.6 - 45.3 %    Monocyte % 4.7 (L) 5.0 - 12.0 %    Eosinophil % 0.6 0.3 - 6.2 %    Basophil % 0.4 0.0 - 1.5 %    Immature Grans % 0.6 (H) 0.0 - 0.5 %    Neutrophils, Absolute 4.06 1.70 - 7.00 10*3/mm3    Lymphocytes, Absolute 0.57 (L) 0.70 - 3.10 10*3/mm3    Monocytes, Absolute 0.23 0.10 - 0.90 10*3/mm3    Eosinophils, Absolute 0.03 0.00 - 0.40 10*3/mm3    Basophils, Absolute 0.02 0.00 - 0.20 10*3/mm3    Immature Grans, Absolute 0.03 0.00 - 0.05 10*3/mm3    nRBC 0.0 0.0 - 0.2 /100 WBC   Urinalysis, Microscopic Only - Urine, Clean Catch    Specimen: Urine, Clean Catch   Result Value Ref Range    RBC, UA 0-2 (A) None Seen /HPF    WBC, UA 0-2 None Seen, 0-2, 3-5 /HPF    Bacteria, UA None Seen None Seen /HPF    Squamous Epithelial Cells, UA 0-2 None Seen, 0-2 /HPF    Hyaline Casts, UA None Seen None Seen /LPF    Methodology Automated Microscopy    Green Top (Gel)   Result Value Ref Range    Extra Tube Hold for add-ons.    Lavender Top   Result Value Ref Range    Extra Tube hold for add-on    Gold Top - SST   Result Value Ref Range    Extra Tube Hold for add-ons.    Light Blue Top   Result Value Ref Range    Extra Tube Hold for add-ons.      US Gallbladder   Final Result   1.  The gallbladder is distended with borderline gallbladder wall   thickening but no evidence of cholelithiasis or pericholecystic   fluid. Sonographic Owens's sign was not reported. Findings could   represent gallbladder hydrops. No definite evidence of acute   cholecystitis.   2.  The common bile duct is  within upper limits of normal   measuring 6 mm. Consider MRCP if there is persistent clinical   concern.   3.  Hepatic steatosis.      Electronically signed by:  Fermin Anglin  6/22/2022 9:44 PM CDT   Workstation: [id number]      CT Abdomen Pelvis With Contrast   Final Result      - Significant fecal retention and colonic anastomoses noted in   the proximal colon, with a possible partial right colectomy, and   in the sigmoid colon where there also appears to be some   associated dystrophic calcification without apparent bowel   obstruction.      - Possible irregular urinary bladder wall thickening versus   artifact related to underdistention.  Suspicious for nonspecific   cystitis, however, clinical correlation is suggested.      - Prostate gland somewhat prominent and heterogeneous in density   without definite focal abnormality seen.  Consider further   evaluation on a nonemergent basis if clinically indicated.      - Other chronic findings including evidence of prior   granulomatous exposure.      Thank you for allowing us to participate in the care of this   patient.      Electronically signed by:  Jose Luis Lopez MD  6/22/2022 8:08 PM CDT   Workstation: [id number]      XR Chest 2 View   Final Result   No evidence of active disease.      Electronically signed by:  Serjio Hurt MD  6/22/2022 3:01 PM CDT   Workstation: [id number]        MDM  Number of Diagnoses or Management Options  Abdominal pain, unspecified abdominal location: new and requires workup  Constipation, unspecified constipation type: new and requires workup  Diagnosis management comments: Vital signs are stable, afebrile.  Labs remarkable for slightly elevated LFTs and a T bili of 3.1.  COVID and flu are negative.  CT abdomen pelvis shows a significant amount of constipation.  No other acute findings.  Gallbladder ultrasound shows a distended gallbladder, but no evidence of stones or cholecystitis.  Recommend follow-up with PCP and GI.  Return precautions given.   Patient states understanding and is agreeable to the plan.       Amount and/or Complexity of Data Reviewed  Decide to obtain previous medical records or to obtain history from someone other than the patient: yes        Final diagnoses:   Abdominal pain, unspecified abdominal location   Constipation, unspecified constipation type       ED Disposition  ED Disposition     ED Disposition   Discharge    Condition   Stable    Comment   --             Melvi Hernandez MD  [address]  [address]  [facility] [address] [phone]    Schedule an appointment as soon as possible for a visit in 2 days  ER follow up    [address]   [facility] [address] [phone]  Schedule an appointment as soon as possible for a visit in 2 days  ER follow up         Medication List      New Prescriptions    polyethylene glycol 17 GM/SCOOP powder  Commonly known as: MIRALAX  Take 17 g by mouth Daily.        Changed    metoprolol succinate XL 50 MG 24 hr tablet  Commonly known as: Toprol XL  Take 1 tablet by mouth Daily.  What changed: additional instructions           Where to Get Your Medications      [facility] [address], [address] [place] AT [facility] [address] - [phone]  - [phone] [address] [place], [facility] [address]    Phone: [phone]   · polyethylene glycol 17 GM/SCOOP powder          Lasha Jones MD  06/22/22 5527

## 2022-06-23 ENCOUNTER — TELEPHONE (OUTPATIENT)
Dept: FAMILY MEDICINE CLINIC | Facility: CLINIC | Age: 67
End: 2022-06-23

## 2022-06-23 ENCOUNTER — HOSPITAL ENCOUNTER (EMERGENCY)
Facility: HOSPITAL | Age: 67
Discharge: HOME | End: 2022-06-23
Attending: STUDENT IN AN ORGANIZED HEALTH CARE EDUCATION/TRAINING PROGRAM | Admitting: STUDENT IN AN ORGANIZED HEALTH CARE EDUCATION/TRAINING PROGRAM

## 2022-06-23 ENCOUNTER — APPOINTMENT (OUTPATIENT)
Dept: ULTRASOUND IMAGING | Facility: HOSPITAL | Age: 67
End: 2022-06-23

## 2022-06-23 VITALS
WEIGHT: 194 LBS | SYSTOLIC BLOOD PRESSURE: 141 MMHG | BODY MASS INDEX: 27.77 KG/M2 | HEIGHT: 70 IN | RESPIRATION RATE: 18 BRPM | TEMPERATURE: 97.8 F | OXYGEN SATURATION: 97 % | DIASTOLIC BLOOD PRESSURE: 71 MMHG | HEART RATE: 68 BPM

## 2022-06-23 DIAGNOSIS — R74.8 ELEVATED LIVER ENZYMES: Primary | ICD-10-CM

## 2022-06-23 DIAGNOSIS — R94.5 ABNORMAL RESULTS OF LIVER FUNCTION STUDIES: ICD-10-CM

## 2022-06-23 DIAGNOSIS — R10.11 RUQ PAIN: ICD-10-CM

## 2022-06-23 DIAGNOSIS — I10 ESSENTIAL HYPERTENSION: Chronic | ICD-10-CM

## 2022-06-23 DIAGNOSIS — R10.11 RIGHT UPPER QUADRANT ABDOMINAL PAIN: Primary | ICD-10-CM

## 2022-06-23 LAB
ALBUMIN SERPL-MCNC: 3.7 G/DL (ref 3.5–5.2)
ALBUMIN/GLOB SERPL: 1.1 G/DL
ALP SERPL-CCNC: 130 U/L (ref 39–117)
ALT SERPL W P-5'-P-CCNC: 147 U/L (ref 1–41)
ANION GAP SERPL CALCULATED.3IONS-SCNC: 12 MMOL/L (ref 5–15)
AST SERPL-CCNC: 64 U/L (ref 1–40)
BACTERIA UR QL AUTO: NORMAL /HPF
BASOPHILS # BLD AUTO: 0.01 10*3/MM3 (ref 0–0.2)
BASOPHILS NFR BLD AUTO: 0.3 % (ref 0–1.5)
BILIRUB SERPL-MCNC: 2.7 MG/DL (ref 0–1.2)
BILIRUB UR QL STRIP: ABNORMAL
BUN SERPL-MCNC: 22 MG/DL (ref 8–23)
BUN/CREAT SERPL: 14.4 (ref 7–25)
CALCIUM SPEC-SCNC: 10.5 MG/DL (ref 8.6–10.5)
CHLORIDE SERPL-SCNC: 102 MMOL/L (ref 98–107)
CLARITY UR: CLEAR
CO2 SERPL-SCNC: 25 MMOL/L (ref 22–29)
COLOR UR: ABNORMAL
CREAT SERPL-MCNC: 1.53 MG/DL (ref 0.76–1.27)
DEPRECATED RDW RBC AUTO: 43.9 FL (ref 37–54)
EGFRCR SERPLBLD CKD-EPI 2021: 49.2 ML/MIN/1.73
EOSINOPHIL # BLD AUTO: 0.05 10*3/MM3 (ref 0–0.4)
EOSINOPHIL NFR BLD AUTO: 1.4 % (ref 0.3–6.2)
ERYTHROCYTE [DISTWIDTH] IN BLOOD BY AUTOMATED COUNT: 13.2 % (ref 12.3–15.4)
GLOBULIN UR ELPH-MCNC: 3.5 GM/DL
GLUCOSE SERPL-MCNC: 139 MG/DL (ref 65–99)
GLUCOSE UR STRIP-MCNC: NEGATIVE MG/DL
HAV IGM SERPL QL IA: NORMAL
HAV IGM SERPL QL IA: NORMAL
HBV CORE IGM SERPL QL IA: NORMAL
HBV CORE IGM SERPL QL IA: NORMAL
HBV SURFACE AG SERPL QL IA: NORMAL
HBV SURFACE AG SERPL QL IA: NORMAL
HCT VFR BLD AUTO: 36.7 % (ref 37.5–51)
HCV AB SER DONR QL: NORMAL
HCV AB SER DONR QL: NORMAL
HGB BLD-MCNC: 12.8 G/DL (ref 13–17.7)
HGB UR QL STRIP.AUTO: NEGATIVE
HOLD SPECIMEN: NORMAL
HOLD SPECIMEN: NORMAL
HYALINE CASTS UR QL AUTO: NORMAL /LPF
IMM GRANULOCYTES # BLD AUTO: 0.01 10*3/MM3 (ref 0–0.05)
IMM GRANULOCYTES NFR BLD AUTO: 0.3 % (ref 0–0.5)
KETONES UR QL STRIP: NEGATIVE
LEUKOCYTE ESTERASE UR QL STRIP.AUTO: ABNORMAL
LIPASE SERPL-CCNC: 58 U/L (ref 13–60)
LYMPHOCYTES # BLD AUTO: 0.97 10*3/MM3 (ref 0.7–3.1)
LYMPHOCYTES NFR BLD AUTO: 27.6 % (ref 19.6–45.3)
MAGNESIUM SERPL-MCNC: 2.5 MG/DL (ref 1.6–2.4)
MCH RBC QN AUTO: 31.4 PG (ref 26.6–33)
MCHC RBC AUTO-ENTMCNC: 34.9 G/DL (ref 31.5–35.7)
MCV RBC AUTO: 90.2 FL (ref 79–97)
MONOCYTES # BLD AUTO: 0.4 10*3/MM3 (ref 0.1–0.9)
MONOCYTES NFR BLD AUTO: 11.4 % (ref 5–12)
NEUTROPHILS NFR BLD AUTO: 2.07 10*3/MM3 (ref 1.7–7)
NEUTROPHILS NFR BLD AUTO: 59 % (ref 42.7–76)
NITRITE UR QL STRIP: NEGATIVE
NRBC BLD AUTO-RTO: 0 /100 WBC (ref 0–0.2)
PH UR STRIP.AUTO: 5.5 [PH] (ref 5–9)
PLATELET # BLD AUTO: 165 10*3/MM3 (ref 140–450)
PMV BLD AUTO: 10.2 FL (ref 6–12)
POTASSIUM SERPL-SCNC: 4.5 MMOL/L (ref 3.5–5.2)
PROT SERPL-MCNC: 7.2 G/DL (ref 6–8.5)
PROT UR QL STRIP: ABNORMAL
RBC # BLD AUTO: 4.07 10*6/MM3 (ref 4.14–5.8)
RBC # UR STRIP: NORMAL /HPF
REF LAB TEST METHOD: NORMAL
SODIUM SERPL-SCNC: 139 MMOL/L (ref 136–145)
SP GR UR STRIP: 1.02 (ref 1–1.03)
SQUAMOUS #/AREA URNS HPF: NORMAL /HPF
UROBILINOGEN UR QL STRIP: ABNORMAL
WBC # UR STRIP: NORMAL /HPF
WBC NRBC COR # BLD: 3.51 10*3/MM3 (ref 3.4–10.8)
WHOLE BLOOD HOLD COAG: NORMAL
WHOLE BLOOD HOLD SPECIMEN: NORMAL

## 2022-06-23 PROCEDURE — 76705 ECHO EXAM OF ABDOMEN: CPT

## 2022-06-23 PROCEDURE — 96372 THER/PROPH/DIAG INJ SC/IM: CPT

## 2022-06-23 PROCEDURE — 36415 COLL VENOUS BLD VENIPUNCTURE: CPT

## 2022-06-23 PROCEDURE — 80053 COMPREHEN METABOLIC PANEL: CPT

## 2022-06-23 PROCEDURE — 99283 EMERGENCY DEPT VISIT LOW MDM: CPT

## 2022-06-23 PROCEDURE — 83735 ASSAY OF MAGNESIUM: CPT | Performed by: STUDENT IN AN ORGANIZED HEALTH CARE EDUCATION/TRAINING PROGRAM

## 2022-06-23 PROCEDURE — 96374 THER/PROPH/DIAG INJ IV PUSH: CPT

## 2022-06-23 PROCEDURE — 80074 ACUTE HEPATITIS PANEL: CPT | Performed by: STUDENT IN AN ORGANIZED HEALTH CARE EDUCATION/TRAINING PROGRAM

## 2022-06-23 PROCEDURE — 96375 TX/PRO/DX INJ NEW DRUG ADDON: CPT

## 2022-06-23 PROCEDURE — 25010000002 MORPHINE PER 10 MG: Performed by: STUDENT IN AN ORGANIZED HEALTH CARE EDUCATION/TRAINING PROGRAM

## 2022-06-23 PROCEDURE — 25010000002 HYDROMORPHONE 1 MG/ML SOLUTION: Performed by: STUDENT IN AN ORGANIZED HEALTH CARE EDUCATION/TRAINING PROGRAM

## 2022-06-23 PROCEDURE — 81001 URINALYSIS AUTO W/SCOPE: CPT

## 2022-06-23 PROCEDURE — 25010000002 ONDANSETRON PER 1 MG: Performed by: STUDENT IN AN ORGANIZED HEALTH CARE EDUCATION/TRAINING PROGRAM

## 2022-06-23 PROCEDURE — 83690 ASSAY OF LIPASE: CPT

## 2022-06-23 PROCEDURE — 85025 COMPLETE CBC W/AUTO DIFF WBC: CPT

## 2022-06-23 RX ORDER — ONDANSETRON 2 MG/ML
4 INJECTION INTRAMUSCULAR; INTRAVENOUS ONCE
Status: COMPLETED | OUTPATIENT
Start: 2022-06-23 | End: 2022-06-23

## 2022-06-23 RX ORDER — ONDANSETRON 4 MG/1
4 TABLET, ORALLY DISINTEGRATING ORAL 4 TIMES DAILY PRN
Qty: 12 TABLET | Refills: 0 | Status: SHIPPED | OUTPATIENT
Start: 2022-06-23

## 2022-06-23 RX ORDER — SODIUM CHLORIDE 0.9 % (FLUSH) 0.9 %
10 SYRINGE (ML) INJECTION AS NEEDED
Status: DISCONTINUED | OUTPATIENT
Start: 2022-06-23 | End: 2022-06-24 | Stop reason: HOSPADM

## 2022-06-23 RX ORDER — KETOROLAC TROMETHAMINE 10 MG/1
10 TABLET, FILM COATED ORAL EVERY 6 HOURS PRN
Qty: 12 TABLET | Refills: 0 | Status: SHIPPED | OUTPATIENT
Start: 2022-06-23 | End: 2022-08-05

## 2022-06-23 RX ORDER — METOPROLOL SUCCINATE 50 MG/1
50 TABLET, EXTENDED RELEASE ORAL DAILY
Qty: 90 TABLET | Refills: 3 | Status: SHIPPED | OUTPATIENT
Start: 2022-06-23 | End: 2022-06-27 | Stop reason: SDUPTHER

## 2022-06-23 RX ADMIN — ONDANSETRON 4 MG: 2 INJECTION INTRAMUSCULAR; INTRAVENOUS at 20:03

## 2022-06-23 RX ADMIN — MORPHINE SULFATE 4 MG: 4 INJECTION INTRAVENOUS at 20:05

## 2022-06-23 RX ADMIN — SODIUM CHLORIDE, POTASSIUM CHLORIDE, SODIUM LACTATE AND CALCIUM CHLORIDE 1000 ML: 600; 310; 30; 20 INJECTION, SOLUTION INTRAVENOUS at 20:06

## 2022-06-23 RX ADMIN — HYDROMORPHONE HYDROCHLORIDE 1 MG: 1 INJECTION, SOLUTION INTRAMUSCULAR; INTRAVENOUS; SUBCUTANEOUS at 22:04

## 2022-06-23 NOTE — TELEPHONE ENCOUNTER
PT WIFE CALLED AND STATES PT'S TEMP .6 BUT WENT BACK DOWN TO 98.2. HE IS COMPLAINING OF PAIN, RATED AT A 6. HE HAS TAKEN THE MIRALAX AND HAS HAD A SMALL BOWEL MOVEMENT. SHE IS CHECKING ON THE STATUS OF THE REFERRAL TO GASTRO

## 2022-06-23 NOTE — TELEPHONE ENCOUNTER
Pt's wife Michel called and said that Ben went to ER and they said that he had constipation and they thought it was probably his Gallbladder and they said that he needed to see Gastroenterology. He sees Dr Skinner and she called their office but it had been a while since he saw Dr Skinner and they said that he needs a referral. Patient's phone is [phone].

## 2022-06-23 NOTE — ED NOTES
Pt reports RUQ pain for the past couple days. Pt has had nausea, but no vomiting. Pt just continues to get worse.

## 2022-06-23 NOTE — ED TRIAGE NOTES
Pt presents to ED with C/O RUQ abd pain that began a few days ago. Pt reports he was seen in this ER yesterday, pain worsening.  Pt reports he spoke with PCP just PTA and she told pt to come back to ED

## 2022-06-24 ENCOUNTER — TELEPHONE (OUTPATIENT)
Dept: FAMILY MEDICINE CLINIC | Facility: CLINIC | Age: 67
End: 2022-06-24

## 2022-06-24 DIAGNOSIS — R74.8 ELEVATED LIVER ENZYMES: Primary | ICD-10-CM

## 2022-06-24 DIAGNOSIS — R10.11 RUQ PAIN: ICD-10-CM

## 2022-06-24 DIAGNOSIS — R50.81 FEVER IN OTHER DISEASES: ICD-10-CM

## 2022-06-24 NOTE — ED PROVIDER NOTES
Subjective   67-year-old male comes back to the ER chief complaint of worsening right upper quadrant abdominal pain.  He endorses nausea, but no vomiting.  He does not much of an appetite.  Patient was seen yesterday in the ER and had an unremarkable work-up including CT abdomen pelvis, gallbladder ultrasound.  Patient spoke with his PCP today and when he mentioned that the sharp pain was getting worse he was advised to come back to the ER.  He took a muscle relaxant at home and reports that it helped with his pain.      History provided by:  Patient and spouse   used: No        Review of Systems   Constitutional: Positive for activity change, appetite change and fatigue. Negative for chills and fever.   HENT: Negative for drooling.    Eyes: Negative for redness.   Respiratory: Negative for shortness of breath.    Cardiovascular: Negative for chest pain.   Gastrointestinal: Positive for abdominal pain and nausea. Negative for vomiting.   Genitourinary: Negative for flank pain.   Skin: Negative for color change.   Neurological: Negative for seizures.   Psychiatric/Behavioral: Negative for confusion.       Past Medical History:   Diagnosis Date   • Anxiety    • Benign prostatic hyperplasia    • Cobalamin deficiency    • Depression    • Diverticular disease of colon    • Hypercholesterolemia    • Hypertension    • Insomnia    • Irritable bowel syndrome    • Migraine    • Osteoarthritis    • Peripheral neuropathy    • Restless legs        Allergies   Allergen Reactions   • Lyrica [Pregabalin] Other (See Comments)     Causes blisters to form on skin   • Ambien [Zolpidem] Hallucinations   • Bee Venom Swelling     Extreme Swelling   • Codeine Nausea And Vomiting       Past Surgical History:   Procedure Laterality Date   • CARDIAC CATHETERIZATION Left 8/6/2021    Procedure: Left Heart Cath;  Surgeon: Lan Wiley MD;  Location: [facility] CATH INVASIVE LOCATION;  Service: Cardiology;  Laterality: Left;    • COLECTOMY PARTIAL / TOTAL  11/2009   • LUMBAR DISC SURGERY  2000       Family History   Problem Relation Age of Onset   • Coronary artery disease Father    • Diabetes Father    • Hypertension Father    • Diabetes Mother    • Hypertension Mother    • Stroke Mother    • Cancer Sister         skin   • Cancer Brother         skin       Social History     Socioeconomic History   • Marital status:    Tobacco Use   • Smoking status: Never Smoker   • Smokeless tobacco: Never Used   Vaping Use   • Vaping Use: Never used   Substance and Sexual Activity   • Alcohol use: Not Currently   • Sexual activity: Not Currently           Objective    Vitals:    06/23/22 2145 06/23/22 2147 06/23/22 2200 06/23/22 2205   BP: 130/63  141/71    BP Location:       Patient Position:       Pulse: 60 62 68    Resp:    18   Temp:       TempSrc:       SpO2: 95% 97% 97%    Weight:       Height:           Physical Exam  Vitals and nursing note reviewed.   Constitutional:       General: He is not in acute distress.     Appearance: He is well-developed. He is obese. He is not ill-appearing, toxic-appearing or diaphoretic.   HENT:      Head: Normocephalic.      Right Ear: External ear normal.      Left Ear: External ear normal.   Pulmonary:      Effort: Pulmonary effort is normal. No accessory muscle usage or respiratory distress.      Breath sounds: No wheezing.   Chest:      Chest wall: No tenderness.   Abdominal:      General: Bowel sounds are normal.      Palpations: Abdomen is soft.      Tenderness: There is abdominal tenderness. There is no right CVA tenderness, left CVA tenderness, guarding or rebound.   Skin:     General: Skin is warm and dry.      Capillary Refill: Capillary refill takes less than 2 seconds.   Neurological:      Mental Status: He is alert and oriented to person, place, and time.   Psychiatric:         Behavior: Behavior normal.         Procedures           ED Course      Results for orders placed or performed during  the hospital encounter of 06/23/22   Comprehensive Metabolic Panel    Specimen: Blood   Result Value Ref Range    Glucose 139 (H) 65 - 99 mg/dL    BUN 22 8 - 23 mg/dL    Creatinine 1.53 (H) 0.76 - 1.27 mg/dL    Sodium 139 136 - 145 mmol/L    Potassium 4.5 3.5 - 5.2 mmol/L    Chloride 102 98 - 107 mmol/L    CO2 25.0 22.0 - 29.0 mmol/L    Calcium 10.5 8.6 - 10.5 mg/dL    Total Protein 7.2 6.0 - 8.5 g/dL    Albumin 3.70 3.50 - 5.20 g/dL    ALT (SGPT) 147 (H) 1 - 41 U/L    AST (SGOT) 64 (H) 1 - 40 U/L    Alkaline Phosphatase 130 (H) 39 - 117 U/L    Total Bilirubin 2.7 (H) 0.0 - 1.2 mg/dL    Globulin 3.5 gm/dL    A/G Ratio 1.1 g/dL    BUN/Creatinine Ratio 14.4 7.0 - 25.0    Anion Gap 12.0 5.0 - 15.0 mmol/L    eGFR 49.2 (L) >60.0 mL/min/1.73   Lipase    Specimen: Blood   Result Value Ref Range    Lipase 58 13 - 60 U/L   Urinalysis With Microscopic If Indicated (No Culture) - Urine, Clean Catch    Specimen: Urine, Clean Catch   Result Value Ref Range    Color, UA Dark Yellow Yellow, Straw, Dark Yellow, Keyonna    Appearance, UA Clear Clear    pH, UA 5.5 5.0 - 9.0    Specific Gravity, UA 1.020 1.003 - 1.030    Glucose, UA Negative Negative    Ketones, UA Negative Negative    Bilirubin, UA Small (1+) (A) Negative    Blood, UA Negative Negative    Protein, UA Trace (A) Negative    Leuk Esterase, UA Trace (A) Negative    Nitrite, UA Negative Negative    Urobilinogen, UA 1.0 E.U./dL 0.2 - 1.0 E.U./dL   CBC Auto Differential    Specimen: Blood   Result Value Ref Range    WBC 3.51 3.40 - 10.80 10*3/mm3    RBC 4.07 (L) 4.14 - 5.80 10*6/mm3    Hemoglobin 12.8 (L) 13.0 - 17.7 g/dL    Hematocrit 36.7 (L) 37.5 - 51.0 %    MCV 90.2 79.0 - 97.0 fL    MCH 31.4 26.6 - 33.0 pg    MCHC 34.9 31.5 - 35.7 g/dL    RDW 13.2 12.3 - 15.4 %    RDW-SD 43.9 37.0 - 54.0 fl    MPV 10.2 6.0 - 12.0 fL    Platelets 165 140 - 450 10*3/mm3    Neutrophil % 59.0 42.7 - 76.0 %    Lymphocyte % 27.6 19.6 - 45.3 %    Monocyte % 11.4 5.0 - 12.0 %    Eosinophil % 1.4  0.3 - 6.2 %    Basophil % 0.3 0.0 - 1.5 %    Immature Grans % 0.3 0.0 - 0.5 %    Neutrophils, Absolute 2.07 1.70 - 7.00 10*3/mm3    Lymphocytes, Absolute 0.97 0.70 - 3.10 10*3/mm3    Monocytes, Absolute 0.40 0.10 - 0.90 10*3/mm3    Eosinophils, Absolute 0.05 0.00 - 0.40 10*3/mm3    Basophils, Absolute 0.01 0.00 - 0.20 10*3/mm3    Immature Grans, Absolute 0.01 0.00 - 0.05 10*3/mm3    nRBC 0.0 0.0 - 0.2 /100 WBC   Urinalysis, Microscopic Only - Urine, Clean Catch    Specimen: Urine, Clean Catch   Result Value Ref Range    RBC, UA None Seen None Seen /HPF    WBC, UA 3-5 None Seen, 0-2, 3-5 /HPF    Bacteria, UA None Seen None Seen /HPF    Squamous Epithelial Cells, UA 0-2 None Seen, 0-2 /HPF    Hyaline Casts, UA None Seen None Seen /LPF    Methodology Manual Light Microscopy    Magnesium    Specimen: Blood   Result Value Ref Range    Magnesium 2.5 (H) 1.6 - 2.4 mg/dL   Green Top (Gel)   Result Value Ref Range    Extra Tube Hold for add-ons.    Lavender Top   Result Value Ref Range    Extra Tube hold for add-on    Gold Top - SST   Result Value Ref Range    Extra Tube Hold for add-ons.    Light Blue Top   Result Value Ref Range    Extra Tube Hold for add-ons.      US Abdomen Limited   Final Result   No gallstones or biliary dilatation. Gallbladder is distended. If   further imaging is clinically warranted, consider follow-up HIDA   Hepatic steatosis.        Electronically signed by:  Sharath Che MD  6/23/2022 9:32 PM CDT   Workstation: [id number]          [facility]  Number of Diagnoses or Management Options  Right upper quadrant abdominal pain: established and worsening  Diagnosis management comments: Vital signs are stable, afebrile.  Labs remarkable for slightly elevated LFTs, alk phos of 130, T bili of 2.7.  Labs are improved compared to yesterday.  Ultrasound today shows a distended gallbladder, but no biliary dilation or gallstones.  Patient received pain medicine, IVF bolus, Zofran.  Spoke with the general surgeon on-call to  arrange follow up and possibly ordering a HIDA scan outpatient due to the patient's symptoms certainly sounding like his gallbladder is the cause.  The surgeon recommended adding a hepatitis panel and follow-up with the patient's PCP and GI.  He does not believe this is a surgical problem.  Hepatitis panel added.  Recommend patient follow-up with his PCP and call Dr. Robins's office in the morning.  Return precautions given.       Amount and/or Complexity of Data Reviewed  Decide to obtain previous medical records or to obtain history from someone other than the patient: yes        Final diagnoses:   Right upper quadrant abdominal pain       ED Disposition  ED Disposition     ED Disposition   Discharge    Condition   Stable    Comment   --             Melvi Hernandez MD  [address]  [address]  [place] [id number] [phone]    Schedule an appointment as soon as possible for a visit in 2 days  ER follow up    Gustabo Robins MD  [address] [place] [address] [address]  [place] [id number] [phone]    Call today  ER follow up         Medication List      New Prescriptions    ketorolac 10 MG tablet  Commonly known as: TORADOL  Take 1 tablet by mouth Every 6 (Six) Hours As Needed for Moderate Pain .     ondansetron ODT 4 MG disintegrating tablet  Commonly known as: ZOFRAN-ODT  Place 1 tablet on the tongue 4 (Four) Times a Day As Needed for Nausea or Vomiting.           Where to Get Your Medications      These medications were sent to [facility] [place] [facility] [address] - [phone]  - [phone] [address] [place] , [place] [address]    Phone: [phone]   · ketorolac 10 MG tablet  · ondansetron ODT 4 MG disintegrating tablet          Lasha Jones MD  06/23/22 5076

## 2022-06-24 NOTE — TELEPHONE ENCOUNTER
Pt needs a hydescan of the gallbladder according to Dr Contreras in ER and needs Dr Hernandez to order it   Pts temp is 99 and if he doesn't move pain is about 3  And scheduled to do labs Monday am before appt with Dr Hernandez  does he still need the appt and labs as he went back to the er second time yesterday so let him know if appt is still needed and if labs are needed on Monday or if not when should he be rescheduled sooner or later just let them know what Dr Hernandez needs them to do     Pt [phone]

## 2022-06-27 ENCOUNTER — LAB (OUTPATIENT)
Dept: LAB | Facility: HOSPITAL | Age: 67
End: 2022-06-27

## 2022-06-27 ENCOUNTER — OFFICE VISIT (OUTPATIENT)
Dept: FAMILY MEDICINE CLINIC | Facility: CLINIC | Age: 67
End: 2022-06-27

## 2022-06-27 VITALS
HEART RATE: 76 BPM | SYSTOLIC BLOOD PRESSURE: 130 MMHG | DIASTOLIC BLOOD PRESSURE: 70 MMHG | HEIGHT: 70 IN | OXYGEN SATURATION: 96 % | TEMPERATURE: 99.3 F | WEIGHT: 188.5 LBS | BODY MASS INDEX: 26.99 KG/M2

## 2022-06-27 DIAGNOSIS — R50.81 FEVER IN OTHER DISEASES: ICD-10-CM

## 2022-06-27 DIAGNOSIS — R10.11 RUQ PAIN: Primary | ICD-10-CM

## 2022-06-27 DIAGNOSIS — R74.8 ELEVATED LIVER ENZYMES: Primary | ICD-10-CM

## 2022-06-27 DIAGNOSIS — Z90.49 HISTORY OF RESECTION OF LARGE BOWEL: ICD-10-CM

## 2022-06-27 DIAGNOSIS — G47.09 OTHER INSOMNIA: Chronic | ICD-10-CM

## 2022-06-27 DIAGNOSIS — K82.8 DILATED GALLBLADDER: ICD-10-CM

## 2022-06-27 DIAGNOSIS — R19.7 DIARRHEA, UNSPECIFIED TYPE: ICD-10-CM

## 2022-06-27 DIAGNOSIS — R10.11 RUQ PAIN: ICD-10-CM

## 2022-06-27 DIAGNOSIS — R74.8 ELEVATED LIVER ENZYMES: ICD-10-CM

## 2022-06-27 DIAGNOSIS — I10 ESSENTIAL HYPERTENSION: Chronic | ICD-10-CM

## 2022-06-27 LAB
ALBUMIN SERPL-MCNC: 4.1 G/DL (ref 3.5–5.2)
ALBUMIN/GLOB SERPL: 1.1 G/DL
ALP SERPL-CCNC: 149 U/L (ref 39–117)
ALT SERPL W P-5'-P-CCNC: 74 U/L (ref 1–41)
ANION GAP SERPL CALCULATED.3IONS-SCNC: 14 MMOL/L (ref 5–15)
AST SERPL-CCNC: 33 U/L (ref 1–40)
BILIRUB SERPL-MCNC: 1.1 MG/DL (ref 0–1.2)
BUN SERPL-MCNC: 22 MG/DL (ref 8–23)
BUN/CREAT SERPL: 17.7 (ref 7–25)
CALCIUM SPEC-SCNC: 9.9 MG/DL (ref 8.6–10.5)
CHLORIDE SERPL-SCNC: 99 MMOL/L (ref 98–107)
CO2 SERPL-SCNC: 24 MMOL/L (ref 22–29)
CREAT SERPL-MCNC: 1.24 MG/DL (ref 0.76–1.27)
EGFRCR SERPLBLD CKD-EPI 2021: 63.3 ML/MIN/1.73
GLOBULIN UR ELPH-MCNC: 3.6 GM/DL
GLUCOSE SERPL-MCNC: 126 MG/DL (ref 65–99)
POTASSIUM SERPL-SCNC: 4 MMOL/L (ref 3.5–5.2)
PROT SERPL-MCNC: 7.7 G/DL (ref 6–8.5)
SODIUM SERPL-SCNC: 137 MMOL/L (ref 136–145)

## 2022-06-27 PROCEDURE — 36415 COLL VENOUS BLD VENIPUNCTURE: CPT

## 2022-06-27 PROCEDURE — 99213 OFFICE O/P EST LOW 20 MIN: CPT | Performed by: GENERAL PRACTICE

## 2022-06-27 PROCEDURE — 80053 COMPREHEN METABOLIC PANEL: CPT

## 2022-06-27 RX ORDER — TRAZODONE HYDROCHLORIDE 50 MG/1
TABLET ORAL
Qty: 180 TABLET | Refills: 0 | Status: SHIPPED | OUTPATIENT
Start: 2022-06-27 | End: 2022-08-23 | Stop reason: SDUPTHER

## 2022-06-27 RX ORDER — METOPROLOL SUCCINATE 50 MG/1
50 TABLET, EXTENDED RELEASE ORAL DAILY
Qty: 90 TABLET | Refills: 0 | Status: SHIPPED | OUTPATIENT
Start: 2022-06-27 | End: 2022-08-23 | Stop reason: SDUPTHER

## 2022-07-18 ENCOUNTER — HOSPITAL ENCOUNTER (OUTPATIENT)
Dept: NUCLEAR MEDICINE | Facility: HOSPITAL | Age: 67
Discharge: HOME | End: 2022-07-18

## 2022-07-18 DIAGNOSIS — R50.81 FEVER IN OTHER DISEASES: ICD-10-CM

## 2022-07-18 DIAGNOSIS — K82.8 DILATED GALLBLADDER: ICD-10-CM

## 2022-07-18 DIAGNOSIS — R10.11 RUQ PAIN: ICD-10-CM

## 2022-07-18 DIAGNOSIS — R74.8 ELEVATED LIVER ENZYMES: ICD-10-CM

## 2022-07-18 PROCEDURE — A9537 TC99M MEBROFENIN: HCPCS | Performed by: GENERAL PRACTICE

## 2022-07-18 PROCEDURE — 78226 HEPATOBILIARY SYSTEM IMAGING: CPT

## 2022-07-18 PROCEDURE — 0 TECHNETIUM TC 99M MEBROFENIN KIT: Performed by: GENERAL PRACTICE

## 2022-07-18 RX ORDER — KIT FOR THE PREPARATION OF TECHNETIUM TC 99M MEBROFENIN 45 MG/10ML
1 INJECTION, POWDER, LYOPHILIZED, FOR SOLUTION INTRAVENOUS
Status: COMPLETED | OUTPATIENT
Start: 2022-07-18 | End: 2022-07-18

## 2022-07-18 RX ADMIN — MEBROFENIN 1 DOSE: 45 INJECTION, POWDER, LYOPHILIZED, FOR SOLUTION INTRAVENOUS at 12:43

## 2022-07-21 DIAGNOSIS — R74.8 ELEVATED LIVER ENZYMES: Primary | ICD-10-CM

## 2022-07-22 DIAGNOSIS — I10 ESSENTIAL HYPERTENSION: Chronic | ICD-10-CM

## 2022-07-22 RX ORDER — AMLODIPINE BESYLATE 10 MG/1
10 TABLET ORAL DAILY
Qty: 90 TABLET | Refills: 3 | Status: SHIPPED | OUTPATIENT
Start: 2022-07-22

## 2022-07-25 ENCOUNTER — TELEPHONE (OUTPATIENT)
Dept: FAMILY MEDICINE CLINIC | Facility: CLINIC | Age: 67
End: 2022-07-25

## 2022-07-25 NOTE — TELEPHONE ENCOUNTER
Per Dr. Hernandez, Mr. Abebe has been notified about his recent x-ray results and recommendations vis MyChart and by letter.  I was not able to contact Mr. Abebe by phone Continue current medications and follow-up as planned or sooner if any problems.         ----- Message from Melvi Hernandez MD sent at 7/21/2022  3:46 PM CDT -----  Call and tell normal, Follow up as scheduled. Do non-fasting labs a few days ahead

## 2022-07-25 NOTE — PROGRESS NOTES
Per Dr. Hernandez, Mr. Abebe has been notified about his recent x-ray results and recommendations vis MyChart and by letter.  I was not able to contact Mr. Abebe by phone Continue current medications and follow-up as planned or sooner if any problems.

## 2022-08-05 ENCOUNTER — OFFICE VISIT (OUTPATIENT)
Dept: SURGERY | Facility: CLINIC | Age: 67
End: 2022-08-05

## 2022-08-05 ENCOUNTER — TRANSCRIBE ORDERS (OUTPATIENT)
Dept: LAB | Facility: HOSPITAL | Age: 67
End: 2022-08-05

## 2022-08-05 ENCOUNTER — LAB (OUTPATIENT)
Dept: LAB | Facility: HOSPITAL | Age: 67
End: 2022-08-05

## 2022-08-05 VITALS
HEIGHT: 70 IN | TEMPERATURE: 96.8 F | HEART RATE: 59 BPM | SYSTOLIC BLOOD PRESSURE: 162 MMHG | WEIGHT: 194 LBS | DIASTOLIC BLOOD PRESSURE: 78 MMHG | BODY MASS INDEX: 27.77 KG/M2

## 2022-08-05 DIAGNOSIS — R10.11 RUQ PAIN: ICD-10-CM

## 2022-08-05 DIAGNOSIS — R10.11 RIGHT UPPER QUADRANT ABDOMINAL PAIN: Primary | ICD-10-CM

## 2022-08-05 DIAGNOSIS — R74.8 ELEVATED LIVER ENZYMES: ICD-10-CM

## 2022-08-05 DIAGNOSIS — R94.5 ABNORMAL RESULTS OF LIVER FUNCTION STUDIES: Primary | ICD-10-CM

## 2022-08-05 DIAGNOSIS — R17 ELEVATED BILIRUBIN: ICD-10-CM

## 2022-08-05 DIAGNOSIS — R94.5 ABNORMAL RESULTS OF LIVER FUNCTION STUDIES: ICD-10-CM

## 2022-08-05 LAB
ALBUMIN SERPL-MCNC: 5 G/DL (ref 3.5–5.2)
ALBUMIN/GLOB SERPL: 2.3 G/DL
ALP SERPL-CCNC: 44 U/L (ref 39–117)
ALT SERPL W P-5'-P-CCNC: 46 U/L (ref 1–41)
ANION GAP SERPL CALCULATED.3IONS-SCNC: 17.3 MMOL/L (ref 5–15)
AST SERPL-CCNC: 32 U/L (ref 1–40)
BILIRUB SERPL-MCNC: 0.5 MG/DL (ref 0–1.2)
BUN SERPL-MCNC: 18 MG/DL (ref 8–23)
BUN/CREAT SERPL: 13 (ref 7–25)
CALCIUM SPEC-SCNC: 10 MG/DL (ref 8.6–10.5)
CHLORIDE SERPL-SCNC: 99 MMOL/L (ref 98–107)
CO2 SERPL-SCNC: 23.7 MMOL/L (ref 22–29)
CREAT SERPL-MCNC: 1.38 MG/DL (ref 0.76–1.27)
EGFRCR SERPLBLD CKD-EPI 2021: 55.7 ML/MIN/1.73
GLOBULIN UR ELPH-MCNC: 2.2 GM/DL
GLUCOSE SERPL-MCNC: 102 MG/DL (ref 65–99)
HAV IGM SERPL QL IA: NORMAL
HBV CORE IGM SERPL QL IA: NORMAL
HBV SURFACE AG SERPL QL IA: NORMAL
HCV AB SER DONR QL: NORMAL
POTASSIUM SERPL-SCNC: 4 MMOL/L (ref 3.5–5.2)
PROT SERPL-MCNC: 7.2 G/DL (ref 6–8.5)
SODIUM SERPL-SCNC: 140 MMOL/L (ref 136–145)

## 2022-08-05 PROCEDURE — 80074 ACUTE HEPATITIS PANEL: CPT

## 2022-08-05 PROCEDURE — 80053 COMPREHEN METABOLIC PANEL: CPT

## 2022-08-05 PROCEDURE — 36415 COLL VENOUS BLD VENIPUNCTURE: CPT

## 2022-08-05 PROCEDURE — 86769 SARS-COV-2 COVID-19 ANTIBODY: CPT

## 2022-08-05 PROCEDURE — 99204 OFFICE O/P NEW MOD 45 MIN: CPT | Performed by: SURGERY

## 2022-08-05 NOTE — PROGRESS NOTES
Subjective   Ben Abebe is a 67 y.o. male     Chief Complaint: 2 episodes of significant right upper quadrant pain    History of Present Illness referred to me by Dr. Skinner after he has been evaluated by Dr. Skinner on referral from the emergency room after he presented there over 2 weeks ago 2 days in a row with significant right upper quadrant pain.  LFTs were significant initially for bilirubin of 2.1 that subsequently came 1.1 with a mildly elevated alkaline phosphatase both times at 130 and 149 and transaminases were mildly elevated at 147 and 64 that became 74 and 33.  No history of pancreatitis or being jaundiced.  Patient describes some sensation of pressure in his right upper quadrant after meals well.  Patient recently had a HIDA scan which demonstrated an ejection fraction of over 50% and it did not reproduce his symptoms.  Ultrasound did not demonstrate any gallstones or any bile duct dilation.  Gallbladder was distended.  CT scan did not demonstrate any obvious gallstones and no mass in the head of the pancreas or pancreas in general.  Patient's had a right colon resection in the distant past and then subsequently had a left colon resection for diverticular disease.  Patient's had diarrhea but that recently resolved per his report.  Patient's had C. difficile infection in the past and took over 2 years to get over this infection.  Review of Systems   Constitutional: Negative.    HENT: Positive for hearing loss.    Eyes: Negative.    Respiratory: Negative.    Cardiovascular: Negative.    Gastrointestinal: Positive for abdominal pain, diarrhea, nausea and vomiting.        Intermittent RUQ pain   Endocrine: Negative.    Genitourinary: Negative.    Musculoskeletal: Negative.    Skin: Negative.    Neurological: Positive for numbness and headaches.        Numbness & Tingling Bilateral Feet and Legs   Hematological: Negative.    Psychiatric/Behavioral: Negative.      Past Medical History:   Diagnosis  Date   • Anxiety    • Benign prostatic hyperplasia    • Cobalamin deficiency    • Depression    • Diverticular disease of colon    • Hypercholesterolemia    • Hypertension    • Insomnia    • Irritable bowel syndrome    • Migraine    • Osteoarthritis    • Peripheral neuropathy    • Restless legs      Past Surgical History:   Procedure Laterality Date   • CARDIAC CATHETERIZATION Left 8/6/2021    Procedure: Left Heart Cath;  Surgeon: Lan Wiley MD;  Location: [facility] CATH INVASIVE LOCATION;  Service: Cardiology;  Laterality: Left;   • COLECTOMY PARTIAL / TOTAL  11/2009   • LUMBAR DISC SURGERY  2000     Family History   Problem Relation Age of Onset   • Coronary artery disease Father    • Diabetes Father    • Hypertension Father    • Diabetes Mother    • Hypertension Mother    • Stroke Mother    • Cancer Sister         skin   • Cancer Brother         skin     Social History     Socioeconomic History   • Marital status:    Tobacco Use   • Smoking status: Never Smoker   • Smokeless tobacco: Never Used   Vaping Use   • Vaping Use: Never used   Substance and Sexual Activity   • Alcohol use: Not Currently   • Sexual activity: Not Currently     Allergies   Allergen Reactions   • Ambien [Zolpidem] Hallucinations   • Bee Venom Swelling     Extreme Swelling   • Lyrica [Pregabalin] Other (See Comments)     Causes blisters to form on skin   • Codeine Nausea And Vomiting     Vitals:    08/05/22 1318   BP: 162/78   Pulse: 59   Temp: 96.8 °F (36 °C)       Home Medications:  Prior to Admission medications    Medication Sig Start Date End Date Taking? Authorizing Provider   Acetaminophen (TYLENOL EXTRA STRENGTH PO) Take 1 tablet by mouth As Needed.   Yes ProviderVince MD   amLODIPine (NORVASC) 10 MG tablet Take 1 tablet by mouth Daily. 7/22/22  Yes Melvi Hernandez MD   aspirin 81 MG EC tablet Take  by mouth.   Yes ProviderVince MD   clopidogrel (PLAVIX) 75 MG tablet Take 1 tablet by mouth Daily. 4/28/22   Yes Melvi Hernandez MD   dicyclomine (BENTYL) 20 MG tablet Take 1 tablet by mouth 4 (Four) Times a Day As Needed (bowel problem). TAKE 1 q 6-8 HOURS AS NEEDED FOR BOWEL PROBLEM 10/9/20  Yes Melvi Hernandez MD   EPINEPHrine (EPIPEN) 0.3 MG/0.3ML solution auto-injector injection Use as directed 8/31/21  Yes Melvi Hernandez MD   fenofibrate (TRICOR) 145 MG tablet Take 1 tablet by mouth Every Night. Takes in the evening 11/12/21  Yes Melvi Hernandez MD   fexofenadine (ALLEGRA) 180 MG tablet Take 180 mg by mouth Daily.   Yes Vince Steen MD   folic acid (FOLVITE) 1 MG tablet Take 1 mg by mouth Daily. Takes in evening   Yes Vince Steen MD   irbesartan-hydrochlorothiazide (AVALIDE) 300-12.5 MG tablet Take 1 tablet by mouth Daily. 8/30/21  Yes Melvi Hernandez MD   magnesium gluconate (MAGONATE) 500 MG tablet Take 500 mg by mouth Daily. Takes in evening   Yes Vince Steen MD   metoprolol succinate XL (Toprol XL) 50 MG 24 hr tablet Take 1 tablet by mouth Daily. 6/27/22  Yes Melvi Hernandez MD   Multiple Vitamins-Minerals (CENTRUM SILVER ULTRA MENS PO) Take 1 tablet by mouth Daily.   Yes Vince Steen MD   ondansetron ODT (ZOFRAN-ODT) 4 MG disintegrating tablet Place 1 tablet on the tongue 4 (Four) Times a Day As Needed for Nausea or Vomiting. 6/23/22  Yes Lasha Jones MD   pravastatin (PRAVACHOL) 20 MG tablet  6/13/22  Yes Vince Steen MD   traZODone (DESYREL) 50 MG tablet TAKE 1 TO 2 TABLETS BY MOUTH EVERY DAY AT BEDTIME AS NEEDED 6/27/22  Yes Melvi Hernandez MD   venlafaxine XR (EFFEXOR-XR) 150 MG 24 hr capsule Take 150 mg by mouth Daily.   Yes Vince Steen MD   vitamin B-12 (CYANOCOBALAMIN) 2500 MCG sublingual tablet tablet Place 5,000 mcg under the tongue Daily.   Yes Vince Steen MD   cyclobenzaprine (FLEXERIL) 10 MG tablet Take 1 tablet by mouth 3 (Three) Times a Day As Needed for Muscle Spasms. 2/7/22 8/5/22  Melvi Hernandez MD    fluticasone (FLONASE) 50 MCG/ACT nasal spray 2 sprays into the nostril(s) as directed by provider Daily. 8/20/18 8/5/22  Melvi Hernandez MD   ketorolac (TORADOL) 10 MG tablet Take 1 tablet by mouth Every 6 (Six) Hours As Needed for Moderate Pain . 6/23/22 8/5/22  Lasha Jones MD   polyethylene glycol (MIRALAX) 17 GM/SCOOP powder Take 17 g by mouth Daily. 6/22/22 8/5/22  Lasha Jones MD       Objective   Physical Exam  Constitutional:       General: He is not in acute distress.     Appearance: He is well-developed.   HENT:      Head: Normocephalic and atraumatic.   Eyes:      General: No scleral icterus.     Conjunctiva/sclera: Conjunctivae normal.   Neck:      Thyroid: No thyromegaly.      Trachea: No tracheal deviation.   Cardiovascular:      Rate and Rhythm: Normal rate and regular rhythm.      Heart sounds: Normal heart sounds. No murmur heard.    No friction rub. No gallop.   Pulmonary:      Effort: Pulmonary effort is normal. No respiratory distress.      Breath sounds: Normal breath sounds. No stridor. No wheezing or rales.   Chest:      Chest wall: No tenderness.   Abdominal:      General: Bowel sounds are normal. There is no distension.      Palpations: Abdomen is soft. There is no mass.      Tenderness: There is no abdominal tenderness. There is no guarding or rebound.      Hernia: No hernia is present.   Musculoskeletal:         General: No deformity. Normal range of motion.      Cervical back: Normal range of motion and neck supple.   Lymphadenopathy:      Cervical: No cervical adenopathy.   Skin:     General: Skin is warm and dry.      Coloration: Skin is not pale.      Findings: No erythema or rash.      Nails: There is no clubbing.   Neurological:      Mental Status: He is alert and oriented to person, place, and time.   Psychiatric:         Behavior: Behavior normal.         Thought Content: Thought content normal.     Midline abdominal incisions got good support abdominal wall without  any obvious hernias at the incision or either groin    Assessment & Plan Right upper quadrant pain unclear etiology.  Has labs pending to include hepatitis panel per Dr. Skinner currently.  We will attempt to review all of his studies myself patient follow-up with us in 1 week or sooner if he has any other concerns or questions.      The encounter diagnosis was Right upper quadrant abdominal pain.                     This document has been electronically signed by Kelechi Velasquez MD on August 5, 2022 16:24 CDT

## 2022-08-09 LAB
LABORATORY COMMENT REPORT: NORMAL
Lab: NORMAL
PATHOLOGIST NAME: NORMAL
SARS-COV-2 IGM SERPL IA-ACNC: 0.01 COI
SARS-COV-2 IGM SERPL QL IA: NEGATIVE

## 2022-08-12 ENCOUNTER — OFFICE VISIT (OUTPATIENT)
Dept: SURGERY | Facility: CLINIC | Age: 67
End: 2022-08-12

## 2022-08-12 VITALS
OXYGEN SATURATION: 98 % | SYSTOLIC BLOOD PRESSURE: 150 MMHG | WEIGHT: 193.4 LBS | HEIGHT: 70 IN | BODY MASS INDEX: 27.69 KG/M2 | TEMPERATURE: 97.2 F | HEART RATE: 78 BPM | DIASTOLIC BLOOD PRESSURE: 80 MMHG

## 2022-08-12 DIAGNOSIS — R10.11 RIGHT UPPER QUADRANT ABDOMINAL PAIN: Primary | ICD-10-CM

## 2022-08-12 PROCEDURE — 99212 OFFICE O/P EST SF 10 MIN: CPT | Performed by: SURGERY

## 2022-08-12 NOTE — PROGRESS NOTES
C7-year-old gentleman returns today.  See previous note.  Patient had no further episodes of abdominal discomfort.  Labs are all normal as far as LFTs are concerned and his hepatitis panel was normal.  I went over these results with him answered all of his questions.  Again we talked about his overall work-up which includes normal gallbladder ultrasound and normal HIDA scan.  Small chance that he may have passed a single stone and sludge not present on any of his studies but there is nothing suggested by his HIDA scan that would suggest that he had any inflammatory changes related to his gallbladder.  We talked about that.  He also takes Plavix for cardiac disease.  He also states that his primary care provider thinks that his pain may have been related to COVID which he has had.  Work-up as far as his gallbladder is concerned is unremarkable he understands that.  Clinical history is suggestive of his gallbladder being an issue obviously.  Discussed options of proceeding with cholecystectomy and patient wishes to just observe at this time.  I provided him with a pamphlet about gallbladder disease and he will call our office if he has any further symptoms or any concerns or questions or changes mind and wishes to proceed with surgery.

## 2022-08-19 DIAGNOSIS — R74.8 ELEVATED LIVER ENZYMES: Primary | ICD-10-CM

## 2022-08-22 ENCOUNTER — LAB (OUTPATIENT)
Dept: LAB | Facility: HOSPITAL | Age: 67
End: 2022-08-22

## 2022-08-22 DIAGNOSIS — R74.8 ELEVATED LIVER ENZYMES: ICD-10-CM

## 2022-08-22 DIAGNOSIS — E78.2 MIXED HYPERLIPIDEMIA: ICD-10-CM

## 2022-08-22 LAB
ALBUMIN SERPL-MCNC: 4.8 G/DL (ref 3.5–5.2)
ALBUMIN/GLOB SERPL: 2.4 G/DL
ALP SERPL-CCNC: 40 U/L (ref 39–117)
ALT SERPL W P-5'-P-CCNC: 41 U/L (ref 1–41)
ANION GAP SERPL CALCULATED.3IONS-SCNC: 10.8 MMOL/L (ref 5–15)
AST SERPL-CCNC: 33 U/L (ref 1–40)
BASOPHILS # BLD AUTO: 0.03 10*3/MM3 (ref 0–0.2)
BASOPHILS NFR BLD AUTO: 0.9 % (ref 0–1.5)
BILIRUB SERPL-MCNC: 0.6 MG/DL (ref 0–1.2)
BUN SERPL-MCNC: 14 MG/DL (ref 8–23)
BUN/CREAT SERPL: 12.2 (ref 7–25)
CALCIUM SPEC-SCNC: 9.8 MG/DL (ref 8.6–10.5)
CHLORIDE SERPL-SCNC: 103 MMOL/L (ref 98–107)
CO2 SERPL-SCNC: 25.2 MMOL/L (ref 22–29)
CREAT SERPL-MCNC: 1.15 MG/DL (ref 0.76–1.27)
DEPRECATED RDW RBC AUTO: 42.5 FL (ref 37–54)
EGFRCR SERPLBLD CKD-EPI 2021: 69.3 ML/MIN/1.73
EOSINOPHIL # BLD AUTO: 0.08 10*3/MM3 (ref 0–0.4)
EOSINOPHIL NFR BLD AUTO: 2.3 % (ref 0.3–6.2)
ERYTHROCYTE [DISTWIDTH] IN BLOOD BY AUTOMATED COUNT: 13.1 % (ref 12.3–15.4)
GLOBULIN UR ELPH-MCNC: 2 GM/DL
GLUCOSE SERPL-MCNC: 97 MG/DL (ref 65–99)
HCT VFR BLD AUTO: 39.8 % (ref 37.5–51)
HGB BLD-MCNC: 13.9 G/DL (ref 13–17.7)
IMM GRANULOCYTES # BLD AUTO: 0.01 10*3/MM3 (ref 0–0.05)
IMM GRANULOCYTES NFR BLD AUTO: 0.3 % (ref 0–0.5)
LYMPHOCYTES # BLD AUTO: 1.25 10*3/MM3 (ref 0.7–3.1)
LYMPHOCYTES NFR BLD AUTO: 35.6 % (ref 19.6–45.3)
MCH RBC QN AUTO: 31.4 PG (ref 26.6–33)
MCHC RBC AUTO-ENTMCNC: 34.9 G/DL (ref 31.5–35.7)
MCV RBC AUTO: 89.8 FL (ref 79–97)
MONOCYTES # BLD AUTO: 0.36 10*3/MM3 (ref 0.1–0.9)
MONOCYTES NFR BLD AUTO: 10.3 % (ref 5–12)
NEUTROPHILS NFR BLD AUTO: 1.78 10*3/MM3 (ref 1.7–7)
NEUTROPHILS NFR BLD AUTO: 50.6 % (ref 42.7–76)
NRBC BLD AUTO-RTO: 0.3 /100 WBC (ref 0–0.2)
PLATELET # BLD AUTO: 202 10*3/MM3 (ref 140–450)
PMV BLD AUTO: 10.6 FL (ref 6–12)
POTASSIUM SERPL-SCNC: 4.1 MMOL/L (ref 3.5–5.2)
PROT SERPL-MCNC: 6.8 G/DL (ref 6–8.5)
RBC # BLD AUTO: 4.43 10*6/MM3 (ref 4.14–5.8)
SODIUM SERPL-SCNC: 139 MMOL/L (ref 136–145)
WBC NRBC COR # BLD: 3.51 10*3/MM3 (ref 3.4–10.8)

## 2022-08-22 PROCEDURE — 80053 COMPREHEN METABOLIC PANEL: CPT

## 2022-08-22 PROCEDURE — 80061 LIPID PANEL: CPT

## 2022-08-22 PROCEDURE — 85025 COMPLETE CBC W/AUTO DIFF WBC: CPT

## 2022-08-22 PROCEDURE — 36415 COLL VENOUS BLD VENIPUNCTURE: CPT

## 2022-08-23 ENCOUNTER — OFFICE VISIT (OUTPATIENT)
Dept: FAMILY MEDICINE CLINIC | Facility: CLINIC | Age: 67
End: 2022-08-23

## 2022-08-23 VITALS
HEIGHT: 70 IN | WEIGHT: 194.2 LBS | OXYGEN SATURATION: 98 % | HEART RATE: 63 BPM | SYSTOLIC BLOOD PRESSURE: 130 MMHG | DIASTOLIC BLOOD PRESSURE: 78 MMHG | RESPIRATION RATE: 18 BRPM | BODY MASS INDEX: 27.8 KG/M2

## 2022-08-23 DIAGNOSIS — G47.09 OTHER INSOMNIA: Chronic | ICD-10-CM

## 2022-08-23 DIAGNOSIS — Z00.00 MEDICARE ANNUAL WELLNESS VISIT, SUBSEQUENT: Primary | ICD-10-CM

## 2022-08-23 DIAGNOSIS — E78.2 MIXED HYPERLIPIDEMIA: ICD-10-CM

## 2022-08-23 DIAGNOSIS — I10 ESSENTIAL HYPERTENSION: Chronic | ICD-10-CM

## 2022-08-23 LAB
CHOLEST SERPL-MCNC: 187 MG/DL (ref 0–200)
HDLC SERPL-MCNC: 33 MG/DL (ref 40–60)
LDLC SERPL CALC-MCNC: 102 MG/DL (ref 0–100)
LDLC/HDLC SERPL: 2.84 {RATIO}
TRIGL SERPL-MCNC: 302 MG/DL (ref 0–150)
VLDLC SERPL-MCNC: 52 MG/DL (ref 5–40)

## 2022-08-23 PROCEDURE — 1126F AMNT PAIN NOTED NONE PRSNT: CPT | Performed by: GENERAL PRACTICE

## 2022-08-23 PROCEDURE — G0439 PPPS, SUBSEQ VISIT: HCPCS | Performed by: GENERAL PRACTICE

## 2022-08-23 PROCEDURE — 1170F FXNL STATUS ASSESSED: CPT | Performed by: GENERAL PRACTICE

## 2022-08-23 PROCEDURE — 1159F MED LIST DOCD IN RCRD: CPT | Performed by: GENERAL PRACTICE

## 2022-08-23 RX ORDER — IRBESARTAN AND HYDROCHLOROTHIAZIDE 300; 12.5 MG/1; MG/1
1 TABLET, FILM COATED ORAL DAILY
Qty: 90 TABLET | Refills: 3 | Status: SHIPPED | OUTPATIENT
Start: 2022-08-23

## 2022-08-23 RX ORDER — METOPROLOL SUCCINATE 50 MG/1
50 TABLET, EXTENDED RELEASE ORAL DAILY
Qty: 90 TABLET | Refills: 1 | Status: SHIPPED | OUTPATIENT
Start: 2022-08-23 | End: 2023-02-27 | Stop reason: SDUPTHER

## 2022-08-23 RX ORDER — TRAZODONE HYDROCHLORIDE 50 MG/1
TABLET ORAL
Qty: 180 TABLET | Refills: 1 | Status: SHIPPED | OUTPATIENT
Start: 2022-08-23 | End: 2023-02-27 | Stop reason: SDUPTHER

## 2022-08-23 RX ORDER — AMLODIPINE BESYLATE 5 MG/1
1 TABLET ORAL DAILY
COMMUNITY
Start: 2022-08-05 | End: 2022-09-29

## 2022-08-23 NOTE — PROGRESS NOTES
The ABCs of the Annual Wellness Visit  Subsequent Medicare Wellness Visit    Chief Complaint   Patient presents with   • Medicare Wellness-subsequent      Subjective    History of Present Illness:  Ben Abebe is a 67 y.o. male who presents for a Subsequent Medicare Wellness Visit.    The following portions of the patient's history were reviewed and   updated as appropriate: allergies, current medications, past family history, past medical history, past social history, past surgical history and problem list.    Compared to one year ago, the patient feels his physical   health is the same.    Compared to one year ago, the patient feels his mental   health is the same.    Recent Hospitalizations:  He was not admitted to the hospital during the last year.       Current Medical Providers:  Patient Care Team:  Melvi Hernandez MD as PCP - General    Outpatient Medications Prior to Visit   Medication Sig Dispense Refill   • Acetaminophen (TYLENOL EXTRA STRENGTH PO) Take 1 tablet by mouth As Needed.     • amLODIPine (NORVASC) 10 MG tablet Take 1 tablet by mouth Daily. 90 tablet 3   • amLODIPine (NORVASC) 5 MG tablet Take 1 tablet by mouth Daily.     • aspirin 81 MG EC tablet Take  by mouth.     • clopidogrel (PLAVIX) 75 MG tablet Take 1 tablet by mouth Daily. 30 tablet 3   • dicyclomine (BENTYL) 20 MG tablet Take 1 tablet by mouth 4 (Four) Times a Day As Needed (bowel problem). TAKE 1 q 6-8 HOURS AS NEEDED FOR BOWEL PROBLEM 60 tablet 1   • EPINEPHrine (EPIPEN) 0.3 MG/0.3ML solution auto-injector injection Use as directed 1 each 2   • fenofibrate (TRICOR) 145 MG tablet Take 1 tablet by mouth Every Night. Takes in the evening 90 tablet 3   • fexofenadine (ALLEGRA) 180 MG tablet Take 180 mg by mouth Daily.     • folic acid (FOLVITE) 1 MG tablet Take 1 mg by mouth Daily. Takes in evening     • magnesium gluconate (MAGONATE) 500 MG tablet Take 500 mg by mouth Daily. Takes in evening     • Multiple Vitamins-Minerals  (CENTRUM SILVER ULTRA MENS PO) Take 1 tablet by mouth Daily.     • ondansetron ODT (ZOFRAN-ODT) 4 MG disintegrating tablet Place 1 tablet on the tongue 4 (Four) Times a Day As Needed for Nausea or Vomiting. 12 tablet 0   • pravastatin (PRAVACHOL) 20 MG tablet      • venlafaxine XR (EFFEXOR-XR) 150 MG 24 hr capsule Take 150 mg by mouth Daily.     • vitamin B-12 (CYANOCOBALAMIN) 2500 MCG sublingual tablet tablet Place 5,000 mcg under the tongue Daily.     • irbesartan-hydrochlorothiazide (AVALIDE) 300-12.5 MG tablet Take 1 tablet by mouth Daily. 90 tablet 3   • metoprolol succinate XL (Toprol XL) 50 MG 24 hr tablet Take 1 tablet by mouth Daily. 90 tablet 0   • traZODone (DESYREL) 50 MG tablet TAKE 1 TO 2 TABLETS BY MOUTH EVERY DAY AT BEDTIME AS NEEDED 180 tablet 0     No facility-administered medications prior to visit.       No opioid medication identified on active medication list. I have reviewed chart for other potential  high risk medication/s and harmful drug interactions in the elderly.          Aspirin is on active medication list. Aspirin use is indicated based on review of current medical condition/s. Pros and cons of this therapy have been discussed today. Benefits of this medication outweigh potential harm.  Patient has been encouraged to continue taking this medication.  .      Patient Active Problem List   Diagnosis   • Insomnia   • Mixed hyperlipidemia   • B12 deficiency   • IBS (irritable bowel syndrome)   • Polyneuropathy   • RLS (restless legs syndrome)   • Anxiety   • Depression   • Essential hypertension   • Degenerative disc disease, lumbar   • Spinal stenosis of lumbar region   • Pain in scapula   • Muscle cramps   • History of surgical procedure   • History of resection of large bowel   • History of lumbar discectomy   • History of colonic polyps   • Headache   • Fibrous nodule   • Family history of premature CAD   • Allergic rhinitis due to pollen   • Chest pain   • Right upper quadrant  "abdominal pain     Advance Care Planning  Advance Directive is not on file.  ACP discussion was held with the patient during this visit. Patient does not have an advance directive, information provided.          Objective    Vitals:    08/23/22 0857   BP: 130/78   Pulse: 63   Resp: 18   SpO2: 98%   Weight: 88.1 kg (194 lb 3.2 oz)   Height: 177.8 cm (70\")   PainSc: 0-No pain     Estimated body mass index is 27.86 kg/m² as calculated from the following:    Height as of this encounter: 177.8 cm (70\").    Weight as of this encounter: 88.1 kg (194 lb 3.2 oz).    BMI is >= 25 and <30. (Overweight) The following options were offered after discussion;: exercise counseling/recommendations and nutrition counseling/recommendations      Does the patient have evidence of cognitive impairment? No    Physical Exam  Constitutional:       General: He is not in acute distress.     Appearance: He is well-developed.   HENT:      Head: Normocephalic and atraumatic.      Nose: Nose normal.   Eyes:      General:         Right eye: No discharge.         Left eye: No discharge.      Conjunctiva/sclera: Conjunctivae normal.      Pupils: Pupils are equal, round, and reactive to light.   Neck:      Thyroid: No thyromegaly.   Cardiovascular:      Rate and Rhythm: Normal rate and regular rhythm.      Heart sounds: Normal heart sounds. No murmur heard.  Pulmonary:      Effort: Pulmonary effort is normal. No respiratory distress.      Breath sounds: Normal breath sounds. No wheezing or rales.   Chest:      Chest wall: No tenderness.   Abdominal:      General: Bowel sounds are normal. There is no distension.      Palpations: Abdomen is soft. There is no mass.      Tenderness: There is no abdominal tenderness.      Hernia: No hernia is present.   Musculoskeletal:         General: No deformity. Normal range of motion.      Cervical back: Normal range of motion.   Lymphadenopathy:      Cervical: No cervical adenopathy.   Skin:     General: Skin is " warm and dry.      Coloration: Skin is not pale.      Findings: No rash.   Neurological:      Mental Status: He is alert and oriented to person, place, and time.      Deep Tendon Reflexes: Reflexes are normal and symmetric.   Psychiatric:         Behavior: Behavior normal.         Thought Content: Thought content normal.         Judgment: Judgment normal.       HEALTH RISK ASSESSMENT    Smoking Status:  Social History     Tobacco Use   Smoking Status Never Smoker   Smokeless Tobacco Never Used     Alcohol Consumption:  Social History     Substance and Sexual Activity   Alcohol Use Not Currently     Fall Risk Screen:    STEADI Fall Risk Assessment was completed, and patient is at LOW risk for falls.Assessment completed on:8/23/2022    Depression Screening:  PHQ-2/PHQ-9 Depression Screening 8/23/2022   Retired PHQ-9 Total Score -   Retired Total Score -   Little Interest or Pleasure in Doing Things 0-->not at all   Feeling Down, Depressed or Hopeless 0-->not at all   PHQ-9: Brief Depression Severity Measure Score 0       Health Habits and Functional and Cognitive Screening:  Functional & Cognitive Status 8/23/2022   Do you have difficulty preparing food and eating? No   Do you have difficulty bathing yourself, getting dressed or grooming yourself? No   Do you have difficulty using the toilet? No   Do you have difficulty moving around from place to place? No   Do you have trouble with steps or getting out of a bed or a chair? No   Current Diet Well Balanced Diet   Dental Exam Up to date   Eye Exam Up to date   Exercise (times per week) 0 times per week   Current Exercises Include No Regular Exercise   Current Exercise Activities Include -   Do you need help using the phone?  No   Are you deaf or do you have serious difficulty hearing?  Yes   Do you need help with transportation? No   Do you need help shopping? No   Do you need help preparing meals?  No   Do you need help with housework?  No   Do you need help with  laundry? No   Do you need help taking your medications? No   Do you need help managing money? No   Do you ever drive or ride in a car without wearing a seat belt? No   Have you felt unusual stress, anger or loneliness in the last month? No   Who do you live with? Spouse   If you need help, do you have trouble finding someone available to you? No   Have you been bothered in the last four weeks by sexual problems? No   Do you have difficulty concentrating, remembering or making decisions? -       Age-appropriate Screening Schedule:  Refer to the list below for future screening recommendations based on patient's age, sex and/or medical conditions. Orders for these recommended tests are listed in the plan section. The patient has been provided with a written plan.    Health Maintenance   Topic Date Due   • INFLUENZA VACCINE  10/01/2022   • LIPID PANEL  11/04/2022   • COLONOSCOPY  02/22/2026   • TDAP/TD VACCINES (2 - Td or Tdap) 10/11/2026   • ZOSTER VACCINE  Completed              Assessment & Plan   CMS Preventative Services Quick Reference  Risk Factors Identified During Encounter  Immunizations Discussed/Encouraged (specific Immunizations; COVID19  The above risks/problems have been discussed with the patient.  Follow up actions/plans if indicated are seen below in the Assessment/Plan Section.  Pertinent information has been shared with the patient in the After Visit Summary.    Diagnoses and all orders for this visit:    1. Medicare annual wellness visit, subsequent (Primary)    2. Essential hypertension  -     irbesartan-hydrochlorothiazide (AVALIDE) 300-12.5 MG tablet; Take 1 tablet by mouth Daily.  Dispense: 90 tablet; Refill: 3  -     metoprolol succinate XL (Toprol XL) 50 MG 24 hr tablet; Take 1 tablet by mouth Daily.  Dispense: 90 tablet; Refill: 1    3. Other insomnia  -     traZODone (DESYREL) 50 MG tablet; TAKE 1 TO 2 TABLETS BY MOUTH EVERY DAY AT BEDTIME AS NEEDED  Dispense: 180 tablet; Refill: 1    4.  Mixed hyperlipidemia  -     Lipid Panel; Future        Follow Up:   Return in about 6 months (around 2/23/2023) for Recheck.   Continue current medications.    An After Visit Summary and PPPS were made available to the patient.  Information has been scanned into chart. Discussed importance of taking medications as prescribed. Encouraged healthy eating habits with low fat, low salt choices and working towards maintaining a healthy weight. Recommended regular exercise if able as well as care to prevent falls including avoiding anything on the floor that they could slip or trip on such as throw rugs, making sure they have a bathmat to step onto when their feet are wet and having grab bars and railings where needed.

## 2022-08-23 NOTE — PATIENT INSTRUCTIONS
Patanol eye drops    Medicare Wellness  Personal Prevention Plan of Service     Date of Office Visit:    Encounter Provider:  Melvi Hernandez MD  Place of Service:  [facility]  Patient Name: Ben Abebe  :  1955    As part of the Medicare Wellness portion of your visit today, we are providing you with this personalized preventive plan of services (PPPS). This plan is based upon recommendations of the [place] Preventive Services Task Force (USPSTF) and the Advisory Committee on Immunization Practices (ACIP).    This lists the preventive care services that should be considered, and provides dates of when you are due. Items listed as completed are up-to-date and do not require any further intervention.    Health Maintenance   Topic Date Due    COVID-19 Vaccine (3 - Booster for Pfizer series) 2021    ANNUAL WELLNESS VISIT  2022    INFLUENZA VACCINE  10/01/2022    LIPID PANEL  2022    COLONOSCOPY  2026    TDAP/TD VACCINES (2 - Td or Tdap) 10/11/2026    HEPATITIS C SCREENING  Completed    Pneumococcal Vaccine 65+  Completed    ZOSTER VACCINE  Completed       No orders of the defined types were placed in this encounter.      No follow-ups on file.        Calorie Counting for Weight Loss  Calories are units of energy. Your body needs a certain number of calories from food to keep going throughout the day. When you eat or drink more calories than your body needs, your body stores the extra calories mostly as fat. When you eat or drink fewer calories than your body needs, your body burns fat to get the energy it needs.  Calorie counting means keeping track of how many calories you eat and drink each day. Calorie counting can be helpful if you need to lose weight. If you eat fewer calories than your body needs, you should lose weight. Ask your health care provider what a healthy weight is for you.  For calorie counting to  work, you will need to eat the right number of calories each day to lose a healthy amount of weight per week. A dietitian can help you figure out how many calories you need in a day and will suggest ways to reach your calorie goal.  A healthy amount of weight to lose each week is usually 1-2 lb (0.5-0.9 kg). This usually means that your daily calorie intake should be reduced by 500-750 calories.  Eating 1,200-1,500 calories a day can help most women lose weight.  Eating 1,500-1,800 calories a day can help most men lose weight.  What do I need to know about calorie counting?  Work with your health care provider or dietitian to determine how many calories you should get each day. To meet your daily calorie goal, you will need to:  Find out how many calories are in each food that you would like to eat. Try to do this before you eat.  Decide how much of the food you plan to eat.  Keep a food log. Do this by writing down what you ate and how many calories it had.  To successfully lose weight, it is important to balance calorie counting with a healthy lifestyle that includes regular activity.  Where do I find calorie information?    The number of calories in a food can be found on a Nutrition Facts label. If a food does not have a Nutrition Facts label, try to look up the calories online or ask your dietitian for help.  Remember that calories are listed per serving. If you choose to have more than one serving of a food, you will have to multiply the calories per serving by the number of servings you plan to eat. For example, the label on a package of bread might say that a serving size is 1 slice and that there are 90 calories in a serving. If you eat 1 slice, you will have eaten 90 calories. If you eat 2 slices, you will have eaten 180 calories.  How do I keep a food log?  After each time that you eat, record the following in your food log as soon as possible:  What you ate. Be sure to include toppings, sauces, and other  extras on the food.  How much you ate. This can be measured in cups, ounces, or number of items.  How many calories were in each food and drink.  The total number of calories in the food you ate.  Keep your food log near you, such as in a pocket-sized notebook or on an elizabeth or website on your mobile phone. Some programs will calculate calories for you and show you how many calories you have left to meet your daily goal.  What are some portion-control tips?  Know how many calories are in a serving. This will help you know how many servings you can have of a certain food.  Use a measuring cup to measure serving sizes. You could also try weighing out portions on a kitchen scale. With time, you will be able to estimate serving sizes for some foods.  Take time to put servings of different foods on your favorite plates or in your favorite bowls and cups so you know what a serving looks like.  Try not to eat straight from a food's packaging, such as from a bag or box. Eating straight from the package makes it hard to see how much you are eating and can lead to overeating. Put the amount you would like to eat in a cup or on a plate to make sure you are eating the right portion.  Use smaller plates, glasses, and bowls for smaller portions and to prevent overeating.  Try not to multitask. For example, avoid watching TV or using your computer while eating. If it is time to eat, sit down at a table and enjoy your food. This will help you recognize when you are full. It will also help you be more mindful of what and how much you are eating.  What are tips for following this plan?  Reading food labels  Check the calorie count compared with the serving size. The serving size may be smaller than what you are used to eating.  Check the source of the calories. Try to choose foods that are high in protein, fiber, and vitamins, and low in saturated fat, trans fat, and sodium.  Shopping  Read nutrition labels while you shop. This will  help you make healthy decisions about which foods to buy.  Pay attention to nutrition labels for low-fat or fat-free foods. These foods sometimes have the same number of calories or more calories than the full-fat versions. They also often have added sugar, starch, or salt to make up for flavor that was removed with the fat.  Make a grocery list of lower-calorie foods and stick to it.  Cooking  Try to cook your favorite foods in a healthier way. For example, try baking instead of frying.  Use low-fat dairy products.  Meal planning  Use more fruits and vegetables. One-half of your plate should be fruits and vegetables.  Include lean proteins, such as chicken, turkey, and fish.  Lifestyle  Each week, aim to do one of the followin minutes of moderate exercise, such as walking.  75 minutes of vigorous exercise, such as running.  General information  Know how many calories are in the foods you eat most often. This will help you calculate calorie counts faster.  Find a way of tracking calories that works for you. Get creative. Try different apps or programs if writing down calories does not work for you.  What foods should I eat?    Eat nutritious foods. It is better to have a nutritious, high-calorie food, such as an avocado, than a food with few nutrients, such as a bag of potato chips.  Use your calories on foods and drinks that will fill you up and will not leave you hungry soon after eating.  Examples of foods that fill you up are nuts and nut butters, vegetables, lean proteins, and high-fiber foods such as whole grains. High-fiber foods are foods with more than 5 g of fiber per serving.  Pay attention to calories in drinks. Low-calorie drinks include water and unsweetened drinks.  The items listed above may not be a complete list of foods and beverages you can eat. Contact a dietitian for more information.  What foods should I limit?  Limit foods or drinks that are not good sources of vitamins, minerals, or  protein or that are high in unhealthy fats. These include:  Candy.  Other sweets.  Sodas, specialty coffee drinks, alcohol, and juice.  The items listed above may not be a complete list of foods and beverages you should avoid. Contact a dietitian for more information.  How do I count calories when eating out?  Pay attention to portions. Often, portions are much larger when eating out. Try these tips to keep portions smaller:  Consider sharing a meal instead of getting your own.  If you get your own meal, eat only half of it. Before you start eating, ask for a container and put half of your meal into it.  When available, consider ordering smaller portions from the menu instead of full portions.  Pay attention to your food and drink choices. Knowing the way food is cooked and what is included with the meal can help you eat fewer calories.  If calories are listed on the menu, choose the lower-calorie options.  Choose dishes that include vegetables, fruits, whole grains, low-fat dairy products, and lean proteins.  Choose items that are boiled, broiled, grilled, or steamed. Avoid items that are buttered, battered, fried, or served with cream sauce. Items labeled as crispy are usually fried, unless stated otherwise.  Choose water, low-fat milk, unsweetened iced tea, or other drinks without added sugar. If you want an alcoholic beverage, choose a lower-calorie option, such as a glass of wine or light beer.  Ask for dressings, sauces, and syrups on the side. These are usually high in calories, so you should limit the amount you eat.  If you want a salad, choose a garden salad and ask for grilled meats. Avoid extra toppings such as minaya, cheese, or fried items. Ask for the dressing on the side, or ask for olive oil and vinegar or lemon to use as dressing.  Estimate how many servings of a food you are given. Knowing serving sizes will help you be aware of how much food you are eating at restaurants.  Where to find more  information  Centers for Disease Control and Prevention: www.cdc.gov  [place] Department of Agriculture: myplate.gov  Summary  Calorie counting means keeping track of how many calories you eat and drink each day. If you eat fewer calories than your body needs, you should lose weight.  A healthy amount of weight to lose per week is usually 1-2 lb (0.5-0.9 kg). This usually means reducing your daily calorie intake by 500-750 calories.  The number of calories in a food can be found on a Nutrition Facts label. If a food does not have a Nutrition Facts label, try to look up the calories online or ask your dietitian for help.  Use smaller plates, glasses, and bowls for smaller portions and to prevent overeating.  Use your calories on foods and drinks that will fill you up and not leave you hungry shortly after a meal.  This information is not intended to replace advice given to you by your health care provider. Make sure you discuss any questions you have with your health care provider.  Document Revised: 01/28/2021 Document Reviewed: 01/28/2021  Elsevier Patient Education © 2021 [employer]

## 2022-08-24 RX ORDER — CLOPIDOGREL BISULFATE 75 MG/1
75 TABLET ORAL DAILY
Qty: 30 TABLET | Refills: 3 | Status: SHIPPED | OUTPATIENT
Start: 2022-08-24 | End: 2023-01-04 | Stop reason: SDUPTHER

## 2022-09-29 PROBLEM — L03.113 CELLULITIS OF RIGHT HAND: Status: ACTIVE | Noted: 2022-09-29

## 2022-11-07 DIAGNOSIS — E78.2 MIXED HYPERLIPIDEMIA: Chronic | ICD-10-CM

## 2022-11-07 RX ORDER — FENOFIBRATE 145 MG/1
145 TABLET, COATED ORAL NIGHTLY
Qty: 90 TABLET | Refills: 3 | Status: SHIPPED | OUTPATIENT
Start: 2022-11-07

## 2022-12-14 ENCOUNTER — OFFICE VISIT (OUTPATIENT)
Dept: FAMILY MEDICINE CLINIC | Facility: CLINIC | Age: 67
End: 2022-12-14

## 2022-12-14 VITALS
HEIGHT: 70 IN | BODY MASS INDEX: 28.12 KG/M2 | SYSTOLIC BLOOD PRESSURE: 130 MMHG | OXYGEN SATURATION: 99 % | RESPIRATION RATE: 18 BRPM | WEIGHT: 196.4 LBS | DIASTOLIC BLOOD PRESSURE: 82 MMHG | HEART RATE: 66 BPM

## 2022-12-14 DIAGNOSIS — Q74.2 TOE ANOMALY: Primary | ICD-10-CM

## 2022-12-14 DIAGNOSIS — K58.0 IRRITABLE BOWEL SYNDROME WITH DIARRHEA: ICD-10-CM

## 2022-12-14 PROCEDURE — 99213 OFFICE O/P EST LOW 20 MIN: CPT | Performed by: GENERAL PRACTICE

## 2022-12-14 RX ORDER — DICYCLOMINE HCL 20 MG
20 TABLET ORAL 4 TIMES DAILY PRN
Qty: 60 TABLET | Refills: 1 | Status: SHIPPED | OUTPATIENT
Start: 2022-12-14 | End: 2023-01-10 | Stop reason: SDUPTHER

## 2022-12-14 NOTE — PROGRESS NOTES
Subjective   Ben Abebe is a 67 y.o. male.   Chief Complaint   Patient presents with   • Toe Pain     L foot      History of Present Illness     Has a discoloration of his left second toe. Is not painful but has significant neuropathy so doesn't feel much. Thinks present for last 4-6 months, not sure if related to injury. Also needs refill on dicyclomine.  .   The following portions of the patient's history were reviewed and updated as appropriate: allergies, current medications, past social history and problem list.    Outpatient Medications Prior to Visit   Medication Sig Dispense Refill   • Acetaminophen (TYLENOL EXTRA STRENGTH PO) Take 1 tablet by mouth As Needed.     • amLODIPine (NORVASC) 10 MG tablet Take 1 tablet by mouth Daily. 90 tablet 3   • aspirin 81 MG EC tablet Take  by mouth.     • clopidogrel (PLAVIX) 75 MG tablet Take 1 tablet by mouth Daily. 30 tablet 3   • EPINEPHrine (EPIPEN) 0.3 MG/0.3ML solution auto-injector injection Use as directed 1 each 2   • fenofibrate (TRICOR) 145 MG tablet Take 1 tablet by mouth Every Night. Takes in the evening 90 tablet 3   • fexofenadine (ALLEGRA) 180 MG tablet Take 180 mg by mouth Daily.     • folic acid (FOLVITE) 1 MG tablet Take 1 mg by mouth Daily. Takes in evening     • irbesartan-hydrochlorothiazide (AVALIDE) 300-12.5 MG tablet Take 1 tablet by mouth Daily. 90 tablet 3   • magnesium gluconate (MAGONATE) 500 MG tablet Take 500 mg by mouth Daily. Takes in evening     • metoprolol succinate XL (Toprol XL) 50 MG 24 hr tablet Take 1 tablet by mouth Daily. 90 tablet 1   • Multiple Vitamins-Minerals (CENTRUM SILVER ULTRA MENS PO) Take 1 tablet by mouth Daily.     • ondansetron ODT (ZOFRAN-ODT) 4 MG disintegrating tablet Place 1 tablet on the tongue 4 (Four) Times a Day As Needed for Nausea or Vomiting. 12 tablet 0   • pravastatin (PRAVACHOL) 20 MG tablet      • traZODone (DESYREL) 50 MG tablet TAKE 1 TO 2 TABLETS BY MOUTH EVERY DAY AT BEDTIME AS NEEDED 180  "tablet 1   • venlafaxine XR (EFFEXOR-XR) 150 MG 24 hr capsule Take 150 mg by mouth Daily.     • vitamin B-12 (CYANOCOBALAMIN) 2500 MCG sublingual tablet tablet Place 5,000 mcg under the tongue Daily.     • dicyclomine (BENTYL) 20 MG tablet Take 1 tablet by mouth 4 (Four) Times a Day As Needed (bowel problem). TAKE 1 q 6-8 HOURS AS NEEDED FOR BOWEL PROBLEM 60 tablet 1   • cephalexin (KEFLEX) 500 MG capsule Take one capsule by mouth 3 times a day until gone. 30 capsule 0     No facility-administered medications prior to visit.       Review of Systems  I have reviewed 12 systems with patient. Findings were negative except what is noted below and/or in history of present illness.     Objective   Visit Vitals  /82   Pulse 66   Resp 18   Ht 177.8 cm (70\")   Wt 89.1 kg (196 lb 6.4 oz)   SpO2 99%   BMI 28.18 kg/m²     Physical Exam  Vitals reviewed.   Constitutional:       Appearance: He is well-developed.   HENT:      Head: Normocephalic and atraumatic.   Eyes:      Conjunctiva/sclera: Conjunctivae normal.      Pupils: Pupils are equal, round, and reactive to light.   Pulmonary:      Effort: Pulmonary effort is normal.   Musculoskeletal:        Feet:    Neurological:      Mental Status: He is alert and oriented to person, place, and time.     Notes brought forward are reviewed and updated if indicated.     Assessment & Plan   Problems Addressed this Visit        Gastrointestinal Abdominal     IBS (irritable bowel syndrome)    Relevant Medications    dicyclomine (BENTYL) 20 MG tablet   Other Visit Diagnoses     Toe anomaly    -  Primary      Diagnoses       Codes Comments    Toe anomaly    -  Primary ICD-10-CM: Q74.2  ICD-9-CM: 755.66     Irritable bowel syndrome with diarrhea     ICD-10-CM: K58.0  ICD-9-CM: 564.1            New Medications Ordered This Visit   Medications   • dicyclomine (BENTYL) 20 MG tablet     Sig: Take 1 tablet by mouth 4 (Four) Times a Day As Needed (bowel problem). TAKE 1 q 6-8 HOURS AS NEEDED " FOR BOWEL PROBLEM     Dispense:  60 tablet     Refill:  1     Return if symptoms worsen or fail to improve, for well adolescent exam.        This document has been electronically signed by Melvi Hernandez MD on December 14, 2022 15:54 CST

## 2023-01-04 RX ORDER — CLOPIDOGREL BISULFATE 75 MG/1
75 TABLET ORAL DAILY
Qty: 30 TABLET | Refills: 3 | Status: SHIPPED | OUTPATIENT
Start: 2023-01-04

## 2023-01-10 DIAGNOSIS — K58.0 IRRITABLE BOWEL SYNDROME WITH DIARRHEA: ICD-10-CM

## 2023-01-10 RX ORDER — DICYCLOMINE HCL 20 MG
20 TABLET ORAL 4 TIMES DAILY PRN
Qty: 60 TABLET | Refills: 1 | Status: SHIPPED | OUTPATIENT
Start: 2023-01-10

## 2023-02-16 ENCOUNTER — TELEPHONE (OUTPATIENT)
Dept: FAMILY MEDICINE CLINIC | Facility: CLINIC | Age: 68
End: 2023-02-16
Payer: MEDICARE

## 2023-02-16 DIAGNOSIS — E78.2 MIXED HYPERLIPIDEMIA: Primary | Chronic | ICD-10-CM

## 2023-02-17 ENCOUNTER — LAB (OUTPATIENT)
Dept: LAB | Facility: HOSPITAL | Age: 68
End: 2023-02-17
Payer: MEDICARE

## 2023-02-17 DIAGNOSIS — E78.2 MIXED HYPERLIPIDEMIA: Chronic | ICD-10-CM

## 2023-02-17 LAB
ALBUMIN SERPL-MCNC: 4.9 G/DL (ref 3.5–5.2)
ALBUMIN/GLOB SERPL: 2.5 G/DL
ALP SERPL-CCNC: 44 U/L (ref 39–117)
ALT SERPL W P-5'-P-CCNC: 49 U/L (ref 1–41)
ANION GAP SERPL CALCULATED.3IONS-SCNC: 11 MMOL/L (ref 5–15)
AST SERPL-CCNC: 38 U/L (ref 1–40)
BILIRUB SERPL-MCNC: 0.6 MG/DL (ref 0–1.2)
BUN SERPL-MCNC: 16 MG/DL (ref 8–23)
BUN/CREAT SERPL: 13.3 (ref 7–25)
CALCIUM SPEC-SCNC: 10.4 MG/DL (ref 8.6–10.5)
CHLORIDE SERPL-SCNC: 99 MMOL/L (ref 98–107)
CHOLEST SERPL-MCNC: 184 MG/DL (ref 0–200)
CO2 SERPL-SCNC: 28 MMOL/L (ref 22–29)
CREAT SERPL-MCNC: 1.2 MG/DL (ref 0.76–1.27)
EGFRCR SERPLBLD CKD-EPI 2021: 65.9 ML/MIN/1.73
GLOBULIN UR ELPH-MCNC: 2 GM/DL
GLUCOSE SERPL-MCNC: 113 MG/DL (ref 65–99)
HDLC SERPL-MCNC: 35 MG/DL (ref 40–60)
LDLC SERPL CALC-MCNC: 84 MG/DL (ref 0–100)
LDLC/HDLC SERPL: 1.97 {RATIO}
POTASSIUM SERPL-SCNC: 4 MMOL/L (ref 3.5–5.2)
PROT SERPL-MCNC: 6.9 G/DL (ref 6–8.5)
SODIUM SERPL-SCNC: 138 MMOL/L (ref 136–145)
TRIGL SERPL-MCNC: 400 MG/DL (ref 0–150)
VLDLC SERPL-MCNC: 65 MG/DL (ref 5–40)

## 2023-02-17 PROCEDURE — 36415 COLL VENOUS BLD VENIPUNCTURE: CPT

## 2023-02-17 PROCEDURE — 80061 LIPID PANEL: CPT

## 2023-02-17 PROCEDURE — 80053 COMPREHEN METABOLIC PANEL: CPT

## 2023-02-27 ENCOUNTER — OFFICE VISIT (OUTPATIENT)
Dept: FAMILY MEDICINE CLINIC | Facility: CLINIC | Age: 68
End: 2023-02-27
Payer: MEDICARE

## 2023-02-27 VITALS
HEIGHT: 70 IN | TEMPERATURE: 98.4 F | OXYGEN SATURATION: 98 % | SYSTOLIC BLOOD PRESSURE: 160 MMHG | WEIGHT: 199 LBS | DIASTOLIC BLOOD PRESSURE: 70 MMHG | BODY MASS INDEX: 28.49 KG/M2 | HEART RATE: 70 BPM

## 2023-02-27 DIAGNOSIS — J30.1 SEASONAL ALLERGIC RHINITIS DUE TO POLLEN: ICD-10-CM

## 2023-02-27 DIAGNOSIS — I10 ESSENTIAL HYPERTENSION: Primary | Chronic | ICD-10-CM

## 2023-02-27 DIAGNOSIS — G47.09 OTHER INSOMNIA: Chronic | ICD-10-CM

## 2023-02-27 DIAGNOSIS — Z12.5 ENCOUNTER FOR PROSTATE CANCER SCREENING: ICD-10-CM

## 2023-02-27 DIAGNOSIS — E78.2 MIXED HYPERLIPIDEMIA: Chronic | ICD-10-CM

## 2023-02-27 DIAGNOSIS — J01.00 ACUTE NON-RECURRENT MAXILLARY SINUSITIS: ICD-10-CM

## 2023-02-27 PROCEDURE — 99214 OFFICE O/P EST MOD 30 MIN: CPT | Performed by: GENERAL PRACTICE

## 2023-02-27 PROCEDURE — 96372 THER/PROPH/DIAG INJ SC/IM: CPT | Performed by: GENERAL PRACTICE

## 2023-02-27 RX ORDER — METOPROLOL SUCCINATE 50 MG/1
50 TABLET, EXTENDED RELEASE ORAL DAILY
Qty: 90 TABLET | Refills: 1 | Status: SHIPPED | OUTPATIENT
Start: 2023-02-27

## 2023-02-27 RX ORDER — ALBUTEROL SULFATE 90 UG/1
2 AEROSOL, METERED RESPIRATORY (INHALATION) EVERY 4 HOURS PRN
Qty: 18 G | Refills: 0 | Status: SHIPPED | OUTPATIENT
Start: 2023-02-27

## 2023-02-27 RX ORDER — TRAZODONE HYDROCHLORIDE 50 MG/1
TABLET ORAL
Qty: 180 TABLET | Refills: 1 | Status: SHIPPED | OUTPATIENT
Start: 2023-02-27

## 2023-02-27 RX ORDER — TRIAMCINOLONE ACETONIDE 40 MG/ML
60 INJECTION, SUSPENSION INTRA-ARTICULAR; INTRAMUSCULAR ONCE
Status: COMPLETED | OUTPATIENT
Start: 2023-02-27 | End: 2023-02-27

## 2023-02-27 RX ORDER — FLUTICASONE PROPIONATE 50 MCG
2 SPRAY, SUSPENSION (ML) NASAL DAILY
Qty: 16 G | Refills: 2 | Status: SHIPPED | OUTPATIENT
Start: 2023-02-27

## 2023-02-27 RX ORDER — PRAVASTATIN SODIUM 20 MG
20 TABLET ORAL NIGHTLY
Qty: 90 TABLET | Refills: 1 | Status: SHIPPED | OUTPATIENT
Start: 2023-02-27

## 2023-02-27 RX ORDER — CEFPROZIL 500 MG/1
500 TABLET, FILM COATED ORAL 2 TIMES DAILY
Qty: 14 TABLET | Refills: 0 | Status: SHIPPED | OUTPATIENT
Start: 2023-02-27

## 2023-02-27 RX ORDER — EPINEPHRINE 0.15 MG/.3ML
0.15 INJECTION INTRAMUSCULAR ONCE
Qty: 1 EACH | Refills: 0 | Status: SHIPPED | OUTPATIENT
Start: 2023-02-27 | End: 2023-02-27

## 2023-02-27 RX ADMIN — TRIAMCINOLONE ACETONIDE 60 MG: 40 INJECTION, SUSPENSION INTRA-ARTICULAR; INTRAMUSCULAR at 09:46

## 2023-02-27 NOTE — PROGRESS NOTES
Subjective   Ben Abebe is a 67 y.o. male.   Chief Complaint   Patient presents with   • Hypertension     For review and evaluation of management of chronic medical problems. Records reviewed. Any recent labs, xrays reviewed and medications reconciled.   Hypertension  This is a chronic problem. The current episode started more than 1 year ago. The problem is unchanged. The problem is uncontrolled. Associated symptoms include headaches and shortness of breath. Pertinent negatives include no chest pain, neck pain or palpitations. There are no associated agents to hypertension. Current antihypertension treatment includes beta blockers, angiotensin blockers, diuretics and calcium channel blockers. The current treatment provides significant improvement. There are no compliance problems.    Hyperlipidemia  This is a chronic problem. The current episode started more than 1 year ago. The problem is uncontrolled. Recent lipid tests were reviewed and are high. Associated symptoms include shortness of breath. Pertinent negatives include no chest pain or myalgias. Current antihyperlipidemic treatment includes fibric acid derivatives and statins. The current treatment provides significant improvement of lipids. There are no compliance problems.    Insomnia  This is a chronic problem. The current episode started more than 1 year ago. The problem occurs constantly. The problem has been unchanged. Associated symptoms include congestion, headaches, numbness and a sore throat. Pertinent negatives include no abdominal pain, arthralgias, chest pain, chills, coughing, fatigue, fever, joint swelling, myalgias, nausea, neck pain, rash, vomiting or weakness. The symptoms are aggravated by stress. Treatments tried: trazodone. The treatment provided significant relief.   Sinus Problem  This is a new problem. The current episode started in the past 7 days. The problem is unchanged. There has been no fever. His pain is at a severity  of 4/10. The pain is moderate. Associated symptoms include congestion, headaches, shortness of breath, sinus pressure, sneezing and a sore throat. Pertinent negatives include no chills, coughing or neck pain. Past treatments include acetaminophen.      The following portions of the patient's history were reviewed and updated as appropriate: allergies, current medications, past social history and problem list.    Outpatient Medications Prior to Visit   Medication Sig Dispense Refill   • Acetaminophen (TYLENOL EXTRA STRENGTH PO) Take 1 tablet by mouth As Needed.     • amLODIPine (NORVASC) 10 MG tablet Take 1 tablet by mouth Daily. 90 tablet 3   • aspirin 81 MG EC tablet Take  by mouth.     • clopidogrel (PLAVIX) 75 MG tablet Take 1 tablet by mouth Daily. 30 tablet 3   • dicyclomine (BENTYL) 20 MG tablet Take 1 tablet by mouth 4 (Four) Times a Day As Needed (bowel problem). TAKE 1 q 6-8 HOURS AS NEEDED FOR BOWEL PROBLEM 60 tablet 1   • fenofibrate (TRICOR) 145 MG tablet Take 1 tablet by mouth Every Night. Takes in the evening 90 tablet 3   • fexofenadine (ALLEGRA) 180 MG tablet Take 180 mg by mouth Daily.     • folic acid (FOLVITE) 1 MG tablet Take 1 mg by mouth Daily. Takes in evening     • irbesartan-hydrochlorothiazide (AVALIDE) 300-12.5 MG tablet Take 1 tablet by mouth Daily. 90 tablet 3   • magnesium gluconate (MAGONATE) 500 MG tablet Take 500 mg by mouth Daily. Takes in evening     • Multiple Vitamins-Minerals (CENTRUM SILVER ULTRA MENS PO) Take 1 tablet by mouth Daily.     • ondansetron ODT (ZOFRAN-ODT) 4 MG disintegrating tablet Place 1 tablet on the tongue 4 (Four) Times a Day As Needed for Nausea or Vomiting. 12 tablet 0   • venlafaxine XR (EFFEXOR-XR) 150 MG 24 hr capsule Take 150 mg by mouth Daily.     • vitamin B-12 (CYANOCOBALAMIN) 2500 MCG sublingual tablet tablet Place 5,000 mcg under the tongue Daily.     • EPINEPHrine (EPIPEN) 0.3 MG/0.3ML solution auto-injector injection Use as directed 1 each 2   •  "metoprolol succinate XL (Toprol XL) 50 MG 24 hr tablet Take 1 tablet by mouth Daily. 90 tablet 1   • pravastatin (PRAVACHOL) 20 MG tablet      • traZODone (DESYREL) 50 MG tablet TAKE 1 TO 2 TABLETS BY MOUTH EVERY DAY AT BEDTIME AS NEEDED 180 tablet 1     No facility-administered medications prior to visit.       Review of Systems   Constitutional: Negative for chills, fatigue and fever.   HENT: Positive for congestion, sinus pressure, sneezing and sore throat.    Respiratory: Positive for shortness of breath. Negative for cough.    Cardiovascular: Negative for chest pain and palpitations.   Gastrointestinal: Negative for abdominal pain, nausea and vomiting.   Musculoskeletal: Negative for arthralgias, joint swelling, myalgias and neck pain.   Skin: Negative for rash.   Neurological: Positive for numbness and headaches. Negative for weakness.   Psychiatric/Behavioral: The patient has insomnia.      I have reviewed 12 systems with patient. Findings were negative except what is noted below and/or in history of present illness.     Objective   Visit Vitals  /70   Pulse 70   Temp 98.4 °F (36.9 °C) (Tympanic)   Ht 177.8 cm (70\")   Wt 90.3 kg (199 lb)   SpO2 98%   BMI 28.55 kg/m²     Physical Exam  Vitals and nursing note reviewed.   Constitutional:       General: He is not in acute distress.     Appearance: He is well-developed.   HENT:      Head: Normocephalic.      Nose: Mucosal edema present.      Right Sinus: Maxillary sinus tenderness present.      Left Sinus: Maxillary sinus tenderness present.   Eyes:      General:         Right eye: No discharge.         Left eye: No discharge.      Conjunctiva/sclera: Conjunctivae normal.      Pupils: Pupils are equal, round, and reactive to light.   Neck:      Thyroid: No thyromegaly.   Cardiovascular:      Rate and Rhythm: Normal rate and regular rhythm.      Heart sounds: Normal heart sounds. No murmur heard.  Pulmonary:      Effort: Pulmonary effort is normal.      " Breath sounds: Normal breath sounds.   Lymphadenopathy:      Cervical: No cervical adenopathy.   Skin:     General: Skin is warm and dry.   Neurological:      Mental Status: He is alert and oriented to person, place, and time.       Results for orders placed or performed in visit on 02/17/23   Comprehensive Metabolic Panel    Specimen: Blood   Result Value Ref Range    Glucose 113 (H) 65 - 99 mg/dL    BUN 16 8 - 23 mg/dL    Creatinine 1.20 0.76 - 1.27 mg/dL    Sodium 138 136 - 145 mmol/L    Potassium 4.0 3.5 - 5.2 mmol/L    Chloride 99 98 - 107 mmol/L    CO2 28.0 22.0 - 29.0 mmol/L    Calcium 10.4 8.6 - 10.5 mg/dL    Total Protein 6.9 6.0 - 8.5 g/dL    Albumin 4.9 3.5 - 5.2 g/dL    ALT (SGPT) 49 (H) 1 - 41 U/L    AST (SGOT) 38 1 - 40 U/L    Alkaline Phosphatase 44 39 - 117 U/L    Total Bilirubin 0.6 0.0 - 1.2 mg/dL    Globulin 2.0 gm/dL    A/G Ratio 2.5 g/dL    BUN/Creatinine Ratio 13.3 7.0 - 25.0    Anion Gap 11.0 5.0 - 15.0 mmol/L    eGFR 65.9 >60.0 mL/min/1.73   Lipid Panel    Specimen: Blood   Result Value Ref Range    Total Cholesterol 184 0 - 200 mg/dL    Triglycerides 400 (H) 0 - 150 mg/dL    HDL Cholesterol 35 (L) 40 - 60 mg/dL    LDL Cholesterol  84 0 - 100 mg/dL    VLDL Cholesterol 65 (H) 5 - 40 mg/dL    LDL/HDL Ratio 1.97       Notes brought forward are reviewed and updated if indicated.     Assessment & Plan   Problems Addressed this Visit        Allergies and Adverse Reactions    Allergic rhinitis due to pollen    Relevant Medications    triamcinolone acetonide (KENALOG-40) injection 60 mg (Completed)    albuterol sulfate  (90 Base) MCG/ACT inhaler    fluticasone (FLONASE) 50 MCG/ACT nasal spray       Cardiac and Vasculature    Mixed hyperlipidemia (Chronic)    Relevant Medications    pravastatin (PRAVACHOL) 20 MG tablet    Other Relevant Orders    Comprehensive Metabolic Panel    Lipid Panel    Urinalysis With Culture If Indicated - Urine, Clean Catch    CBC & Differential    PSA Screen     Essential hypertension - Primary (Chronic)    Relevant Medications    EPINEPHrine (EpiPen Jr 2-Lizandro) 0.15 MG/0.3ML solution auto-injector injection    metoprolol succinate XL (Toprol XL) 50 MG 24 hr tablet    Other Relevant Orders    Comprehensive Metabolic Panel    Lipid Panel    Urinalysis With Culture If Indicated - Urine, Clean Catch    CBC & Differential    PSA Screen       Sleep    Insomnia (Chronic)    Relevant Medications    traZODone (DESYREL) 50 MG tablet   Other Visit Diagnoses     Acute non-recurrent maxillary sinusitis        Relevant Medications    triamcinolone acetonide (KENALOG-40) injection 60 mg (Completed)    cefprozil (CEFZIL) 500 MG tablet    fluticasone (FLONASE) 50 MCG/ACT nasal spray    Encounter for prostate cancer screening        Relevant Orders    PSA Screen      Diagnoses       Codes Comments    Essential hypertension    -  Primary ICD-10-CM: I10  ICD-9-CM: 401.9     Acute non-recurrent maxillary sinusitis     ICD-10-CM: J01.00  ICD-9-CM: 461.0     Seasonal allergic rhinitis due to pollen     ICD-10-CM: J30.1  ICD-9-CM: 477.0     Other insomnia     ICD-10-CM: G47.09  ICD-9-CM: 780.52     Mixed hyperlipidemia     ICD-10-CM: E78.2  ICD-9-CM: 272.2     Encounter for prostate cancer screening     ICD-10-CM: Z12.5  ICD-9-CM: V76.44           Continue current medications. Flonase, cefzil, kenalog shot. Fever control, fluids, rest.  Recheck if not improving.      New Medications Ordered This Visit   Medications   • triamcinolone acetonide (KENALOG-40) injection 60 mg   • albuterol sulfate  (90 Base) MCG/ACT inhaler     Sig: Inhale 2 puffs Every 4 (Four) Hours As Needed for Wheezing.     Dispense:  18 g     Refill:  0   • EPINEPHrine (EpiPen Jr 2-Lizandro) 0.15 MG/0.3ML solution auto-injector injection     Sig: Inject 0.3 mL into the appropriate muscle as directed by prescriber 1 (One) Time for 1 dose.     Dispense:  1 each     Refill:  0   • cefprozil (CEFZIL) 500 MG tablet     Sig: Take 1  tablet by mouth 2 (Two) Times a Day.     Dispense:  14 tablet     Refill:  0   • fluticasone (FLONASE) 50 MCG/ACT nasal spray     Si sprays into the nostril(s) as directed by provider Daily.     Dispense:  16 g     Refill:  2   • metoprolol succinate XL (Toprol XL) 50 MG 24 hr tablet     Sig: Take 1 tablet by mouth Daily.     Dispense:  90 tablet     Refill:  1   • traZODone (DESYREL) 50 MG tablet     Sig: TAKE 1 TO 2 TABLETS BY MOUTH EVERY DAY AT BEDTIME AS NEEDED     Dispense:  180 tablet     Refill:  1   • pravastatin (PRAVACHOL) 20 MG tablet     Sig: Take 1 tablet by mouth Every Night.     Dispense:  90 tablet     Refill:  1     Return for medicare wellness visit, Annual physical.        This document has been electronically signed by Melvi Hernandez MD on 2023 09:57 CST

## 2023-02-28 RX ORDER — EPINEPHRINE 0.3 MG/.3ML
0.3 INJECTION SUBCUTANEOUS ONCE
Qty: 2 EACH | Refills: 0 | Status: SHIPPED | OUTPATIENT
Start: 2023-02-28 | End: 2023-02-28

## 2023-04-24 RX ORDER — CLOPIDOGREL BISULFATE 75 MG/1
75 TABLET ORAL DAILY
Qty: 30 TABLET | Refills: 3 | Status: SHIPPED | OUTPATIENT
Start: 2023-04-24

## 2023-06-16 DIAGNOSIS — I10 ESSENTIAL HYPERTENSION: Chronic | ICD-10-CM

## 2023-06-16 RX ORDER — METOPROLOL SUCCINATE 50 MG/1
50 TABLET, EXTENDED RELEASE ORAL DAILY
Qty: 90 TABLET | Refills: 1 | Status: SHIPPED | OUTPATIENT
Start: 2023-06-16

## 2023-07-27 DIAGNOSIS — I10 ESSENTIAL HYPERTENSION: Chronic | ICD-10-CM

## 2023-07-27 RX ORDER — AMLODIPINE BESYLATE 10 MG/1
10 TABLET ORAL DAILY
Qty: 90 TABLET | Refills: 3 | Status: SHIPPED | OUTPATIENT
Start: 2023-07-27

## 2023-07-27 NOTE — TELEPHONE ENCOUNTER
Incoming Refill Request      Medication requested (name and dose):     Pharmacy where request should be sent:     Additional details provided by patient:     Best call back number:     Does the patient have less than a 3 day supply:  [] Yes  [] No    Laura Diamond MA  07/27/23, 16:24 CDT

## 2023-08-18 NOTE — ED NOTES
Called parent regarding schedule change as per her request. Left message with options and requesting her to call back as to how she wants to proceed   Patient presents to ED with c/o right abd pain for two days. Patient was seen by  David and was told his liver levels were elevated. She sent him here for CT.  Hx of colon resection, he is unsure if he has appendix still. Last BM on Monday.

## 2023-08-29 ENCOUNTER — OFFICE VISIT (OUTPATIENT)
Dept: FAMILY MEDICINE CLINIC | Facility: CLINIC | Age: 68
End: 2023-08-29
Payer: MEDICARE

## 2023-08-29 VITALS
DIASTOLIC BLOOD PRESSURE: 70 MMHG | HEART RATE: 58 BPM | HEIGHT: 70 IN | SYSTOLIC BLOOD PRESSURE: 110 MMHG | WEIGHT: 197.7 LBS | OXYGEN SATURATION: 97 % | BODY MASS INDEX: 28.3 KG/M2

## 2023-08-29 DIAGNOSIS — E78.2 MIXED HYPERLIPIDEMIA: Chronic | ICD-10-CM

## 2023-08-29 DIAGNOSIS — G47.09 OTHER INSOMNIA: Chronic | ICD-10-CM

## 2023-08-29 DIAGNOSIS — Z00.00 MEDICARE ANNUAL WELLNESS VISIT, SUBSEQUENT: Primary | ICD-10-CM

## 2023-08-29 DIAGNOSIS — I10 ESSENTIAL HYPERTENSION: Chronic | ICD-10-CM

## 2023-08-29 PROCEDURE — 1170F FXNL STATUS ASSESSED: CPT | Performed by: GENERAL PRACTICE

## 2023-08-29 PROCEDURE — 3078F DIAST BP <80 MM HG: CPT | Performed by: GENERAL PRACTICE

## 2023-08-29 PROCEDURE — G0439 PPPS, SUBSEQ VISIT: HCPCS | Performed by: GENERAL PRACTICE

## 2023-08-29 PROCEDURE — 1159F MED LIST DOCD IN RCRD: CPT | Performed by: GENERAL PRACTICE

## 2023-08-29 PROCEDURE — 1160F RVW MEDS BY RX/DR IN RCRD: CPT | Performed by: GENERAL PRACTICE

## 2023-08-29 PROCEDURE — 3074F SYST BP LT 130 MM HG: CPT | Performed by: GENERAL PRACTICE

## 2023-08-29 RX ORDER — TRAZODONE HYDROCHLORIDE 50 MG/1
TABLET ORAL
Qty: 180 TABLET | Refills: 1 | Status: SHIPPED | OUTPATIENT
Start: 2023-08-29

## 2023-08-29 RX ORDER — PRAVASTATIN SODIUM 20 MG
20 TABLET ORAL NIGHTLY
Qty: 90 TABLET | Refills: 1 | Status: SHIPPED | OUTPATIENT
Start: 2023-08-29

## 2023-08-29 RX ORDER — FENOFIBRATE 145 MG/1
145 TABLET, COATED ORAL NIGHTLY
Qty: 90 TABLET | Refills: 3 | Status: SHIPPED | OUTPATIENT
Start: 2023-08-29

## 2023-08-29 RX ORDER — IRBESARTAN AND HYDROCHLOROTHIAZIDE 300; 12.5 MG/1; MG/1
1 TABLET, FILM COATED ORAL DAILY
Qty: 90 TABLET | Refills: 3 | Status: SHIPPED | OUTPATIENT
Start: 2023-08-29

## 2023-08-29 NOTE — PATIENT INSTRUCTIONS
Medicare Wellness  Personal Prevention Plan of Service     Date of Office Visit:    Encounter Provider:  Melvi Hernandez MD  Place of Service:  [facility] - [place]  Patient Name: Ben Abebe  :  1955    As part of the Medicare Wellness portion of your visit today, we are providing you with this personalized preventive plan of services (PPPS). This plan is based upon recommendations of the [place] Preventive Services Task Force (USPSTF) and the Advisory Committee on Immunization Practices (ACIP).    This lists the preventive care services that should be considered, and provides dates of when you are due. Items listed as completed are up-to-date and do not require any further intervention.    Health Maintenance   Topic Date Due    COVID-19 Vaccine (3 - Pfizer series) 2021    ANNUAL WELLNESS VISIT  2023    BMI FOLLOWUP  2023    INFLUENZA VACCINE  10/01/2023    LIPID PANEL  2024    COLONOSCOPY  2026    TDAP/TD VACCINES (2 - Td or Tdap) 10/11/2026    HEPATITIS C SCREENING  Completed    Pneumococcal Vaccine 65+  Completed    ZOSTER VACCINE  Completed       No orders of the defined types were placed in this encounter.      No follow-ups on file.        Calorie Counting for Weight Loss  Calories are units of energy. Your body needs a certain number of calories from food to keep going throughout the day. When you eat or drink more calories than your body needs, your body stores the extra calories mostly as fat. When you eat or drink fewer calories than your body needs, your body burns fat to get the energy it needs.  Calorie counting means keeping track of how many calories you eat and drink each day. Calorie counting can be helpful if you need to lose weight. If you eat fewer calories than your body needs, you should lose weight. Ask your health care provider what a healthy weight is for you.  For calorie counting to  work, you will need to eat the right number of calories each day to lose a healthy amount of weight per week. A dietitian can help you figure out how many calories you need in a day and will suggest ways to reach your calorie goal.  A healthy amount of weight to lose each week is usually 1-2 lb (0.5-0.9 kg). This usually means that your daily calorie intake should be reduced by 500-750 calories.  Eating 1,200-1,500 calories a day can help most women lose weight.  Eating 1,500-1,800 calories a day can help most men lose weight.  What do I need to know about calorie counting?  Work with your health care provider or dietitian to determine how many calories you should get each day. To meet your daily calorie goal, you will need to:  Find out how many calories are in each food that you would like to eat. Try to do this before you eat.  Decide how much of the food you plan to eat.  Keep a food log. Do this by writing down what you ate and how many calories it had.  To successfully lose weight, it is important to balance calorie counting with a healthy lifestyle that includes regular activity.  Where do I find calorie information?    The number of calories in a food can be found on a Nutrition Facts label. If a food does not have a Nutrition Facts label, try to look up the calories online or ask your dietitian for help.  Remember that calories are listed per serving. If you choose to have more than one serving of a food, you will have to multiply the calories per serving by the number of servings you plan to eat. For example, the label on a package of bread might say that a serving size is 1 slice and that there are 90 calories in a serving. If you eat 1 slice, you will have eaten 90 calories. If you eat 2 slices, you will have eaten 180 calories.  How do I keep a food log?  After each time that you eat, record the following in your food log as soon as possible:  What you ate. Be sure to include toppings, sauces, and other  extras on the food.  How much you ate. This can be measured in cups, ounces, or number of items.  How many calories were in each food and drink.  The total number of calories in the food you ate.  Keep your food log near you, such as in a pocket-sized notebook or on an elizabeth or website on your mobile phone. Some programs will calculate calories for you and show you how many calories you have left to meet your daily goal.  What are some portion-control tips?  Know how many calories are in a serving. This will help you know how many servings you can have of a certain food.  Use a measuring cup to measure serving sizes. You could also try weighing out portions on a kitchen scale. With time, you will be able to estimate serving sizes for some foods.  Take time to put servings of different foods on your favorite plates or in your favorite bowls and cups so you know what a serving looks like.  Try not to eat straight from a food's packaging, such as from a bag or box. Eating straight from the package makes it hard to see how much you are eating and can lead to overeating. Put the amount you would like to eat in a cup or on a plate to make sure you are eating the right portion.  Use smaller plates, glasses, and bowls for smaller portions and to prevent overeating.  Try not to multitask. For example, avoid watching TV or using your computer while eating. If it is time to eat, sit down at a table and enjoy your food. This will help you recognize when you are full. It will also help you be more mindful of what and how much you are eating.  What are tips for following this plan?  Reading food labels  Check the calorie count compared with the serving size. The serving size may be smaller than what you are used to eating.  Check the source of the calories. Try to choose foods that are high in protein, fiber, and vitamins, and low in saturated fat, trans fat, and sodium.  Shopping  Read nutrition labels while you shop. This will  help you make healthy decisions about which foods to buy.  Pay attention to nutrition labels for low-fat or fat-free foods. These foods sometimes have the same number of calories or more calories than the full-fat versions. They also often have added sugar, starch, or salt to make up for flavor that was removed with the fat.  Make a grocery list of lower-calorie foods and stick to it.  Cooking  Try to cook your favorite foods in a healthier way. For example, try baking instead of frying.  Use low-fat dairy products.  Meal planning  Use more fruits and vegetables. One-half of your plate should be fruits and vegetables.  Include lean proteins, such as chicken, turkey, and fish.  Lifestyle  Each week, aim to do one of the followin minutes of moderate exercise, such as walking.  75 minutes of vigorous exercise, such as running.  General information  Know how many calories are in the foods you eat most often. This will help you calculate calorie counts faster.  Find a way of tracking calories that works for you. Get creative. Try different apps or programs if writing down calories does not work for you.  What foods should I eat?    Eat nutritious foods. It is better to have a nutritious, high-calorie food, such as an avocado, than a food with few nutrients, such as a bag of potato chips.  Use your calories on foods and drinks that will fill you up and will not leave you hungry soon after eating.  Examples of foods that fill you up are nuts and nut butters, vegetables, lean proteins, and high-fiber foods such as whole grains. High-fiber foods are foods with more than 5 g of fiber per serving.  Pay attention to calories in drinks. Low-calorie drinks include water and unsweetened drinks.  The items listed above may not be a complete list of foods and beverages you can eat. Contact a dietitian for more information.  What foods should I limit?  Limit foods or drinks that are not good sources of vitamins, minerals, or  protein or that are high in unhealthy fats. These include:  Candy.  Other sweets.  Sodas, specialty coffee drinks, alcohol, and juice.  The items listed above may not be a complete list of foods and beverages you should avoid. Contact a dietitian for more information.  How do I count calories when eating out?  Pay attention to portions. Often, portions are much larger when eating out. Try these tips to keep portions smaller:  Consider sharing a meal instead of getting your own.  If you get your own meal, eat only half of it. Before you start eating, ask for a container and put half of your meal into it.  When available, consider ordering smaller portions from the menu instead of full portions.  Pay attention to your food and drink choices. Knowing the way food is cooked and what is included with the meal can help you eat fewer calories.  If calories are listed on the menu, choose the lower-calorie options.  Choose dishes that include vegetables, fruits, whole grains, low-fat dairy products, and lean proteins.  Choose items that are boiled, broiled, grilled, or steamed. Avoid items that are buttered, battered, fried, or served with cream sauce. Items labeled as crispy are usually fried, unless stated otherwise.  Choose water, low-fat milk, unsweetened iced tea, or other drinks without added sugar. If you want an alcoholic beverage, choose a lower-calorie option, such as a glass of wine or light beer.  Ask for dressings, sauces, and syrups on the side. These are usually high in calories, so you should limit the amount you eat.  If you want a salad, choose a garden salad and ask for grilled meats. Avoid extra toppings such as minaya, cheese, or fried items. Ask for the dressing on the side, or ask for olive oil and vinegar or lemon to use as dressing.  Estimate how many servings of a food you are given. Knowing serving sizes will help you be aware of how much food you are eating at restaurants.  Where to find more  information  Centers for Disease Control and Prevention: www.cdc.gov  [place] Department of Agriculture: myplate.gov  Summary  Calorie counting means keeping track of how many calories you eat and drink each day. If you eat fewer calories than your body needs, you should lose weight.  A healthy amount of weight to lose per week is usually 1-2 lb (0.5-0.9 kg). This usually means reducing your daily calorie intake by 500-750 calories.  The number of calories in a food can be found on a Nutrition Facts label. If a food does not have a Nutrition Facts label, try to look up the calories online or ask your dietitian for help.  Use smaller plates, glasses, and bowls for smaller portions and to prevent overeating.  Use your calories on foods and drinks that will fill you up and not leave you hungry shortly after a meal.  This information is not intended to replace advice given to you by your health care provider. Make sure you discuss any questions you have with your health care provider.  Document Revised: 01/28/2021 Document Reviewed: 01/28/2021  [employer] Education c 2023 [employer]

## 2023-08-29 NOTE — PROGRESS NOTES
The ABCs of the Annual Wellness Visit  Subsequent Medicare Wellness Visit    Chief Complaint   Patient presents with    Annual Exam    Medicare Wellness-subsequent      Subjective    History of Present Illness:  Ben Abebe is a 68 y.o. male who presents for a Subsequent Medicare Wellness Visit. Labs pending.     The following portions of the patient's history were reviewed and   updated as appropriate: allergies, current medications, past family history, past medical history, past social history, past surgical history, and problem list.    Compared to one year ago, the patient feels his physical   health is the same.    Compared to one year ago, the patient feels his mental   health is the same.    Recent Hospitalizations:  He was not admitted to the hospital during the last year.     Current Medical Providers:  Patient Care Team:  Melvi Hernandez MD as PCP - General    Outpatient Medications Prior to Visit   Medication Sig Dispense Refill    Acetaminophen (TYLENOL EXTRA STRENGTH PO) Take 1 tablet by mouth As Needed.      albuterol sulfate  (90 Base) MCG/ACT inhaler Inhale 2 puffs Every 4 (Four) Hours As Needed for Wheezing. 18 g 0    amLODIPine (NORVASC) 10 MG tablet Take 1 tablet by mouth Daily. 90 tablet 3    aspirin 81 MG EC tablet Take  by mouth.      clopidogrel (PLAVIX) 75 MG tablet Take 1 tablet by mouth Daily. 30 tablet 3    cyclobenzaprine (FLEXERIL) 10 MG tablet Take 1 tablet by mouth 3 (Three) Times a Day As Needed for Muscle Spasms. 30 tablet 1    dicyclomine (BENTYL) 20 MG tablet Take 1 tablet by mouth 4 (Four) Times a Day As Needed (bowel problem). TAKE 1 q 6-8 HOURS AS NEEDED FOR BOWEL PROBLEM 60 tablet 1    fexofenadine (ALLEGRA) 180 MG tablet Take 1 tablet by mouth Daily.      fluticasone (FLONASE) 50 MCG/ACT nasal spray 2 sprays into the nostril(s) as directed by provider Daily. 16 g 2    folic acid (FOLVITE) 1 MG tablet Take 1 tablet by mouth Daily. Takes in evening       magnesium gluconate (MAGONATE) 500 MG tablet Take 500 mg by mouth Daily. Takes in evening      metoprolol succinate XL (Toprol XL) 50 MG 24 hr tablet Take 1 tablet by mouth Daily. 90 tablet 1    Multiple Vitamins-Minerals (CENTRUM SILVER ULTRA MENS PO) Take 1 tablet by mouth Daily.      ondansetron ODT (ZOFRAN-ODT) 4 MG disintegrating tablet Place 1 tablet on the tongue 4 (Four) Times a Day As Needed for Nausea or Vomiting. 12 tablet 0    venlafaxine XR (EFFEXOR-XR) 150 MG 24 hr capsule Take 1 capsule by mouth Daily.      vitamin B-12 (CYANOCOBALAMIN) 2500 MCG sublingual tablet tablet Place 5,000 mcg under the tongue Daily.      fenofibrate (TRICOR) 145 MG tablet Take 1 tablet by mouth Every Night. Takes in the evening 90 tablet 3    irbesartan-hydrochlorothiazide (AVALIDE) 300-12.5 MG tablet Take 1 tablet by mouth Daily. 90 tablet 3    pravastatin (PRAVACHOL) 20 MG tablet Take 1 tablet by mouth Every Night. 90 tablet 1    traZODone (DESYREL) 50 MG tablet TAKE 1 TO 2 TABLETS BY MOUTH EVERY DAY AT BEDTIME AS NEEDED 180 tablet 1    cefprozil (CEFZIL) 500 MG tablet Take 1 tablet by mouth 2 (Two) Times a Day. (Patient not taking: Reported on 8/29/2023) 14 tablet 0     No facility-administered medications prior to visit.       No opioid medication identified on active medication list. I have reviewed chart for other potential  high risk medication/s and harmful drug interactions in the elderly.        Aspirin is on active medication list. Aspirin use is indicated based on review of current medical condition/s. Pros and cons of this therapy have been discussed today. Benefits of this medication outweigh potential harm.  Patient has been encouraged to continue taking this medication.  .      Patient Active Problem List   Diagnosis    Insomnia    Mixed hyperlipidemia    B12 deficiency    IBS (irritable bowel syndrome)    Polyneuropathy    RLS (restless legs syndrome)    Anxiety    Depression    Essential hypertension     "Degenerative disc disease, lumbar    Spinal stenosis of lumbar region    Pain in scapula    Muscle cramps    History of surgical procedure    History of resection of large bowel    History of lumbar discectomy    History of colonic polyps    Headache    Fibrous nodule    Family history of premature CAD    Allergic rhinitis due to pollen    Chest pain    Right upper quadrant abdominal pain    History of partial surgical removal of colon    Hernia of anterior abdominal wall    Diverticular disease of colon    Cellulitis of right hand     Advance Care Planning  Advance Directive is not on file.  ACP discussion was held with the patient during this visit. Patient does not have an advance directive, information provided.          Objective    Vitals:    08/29/23 0759   BP: 110/70   Pulse: 58   SpO2: 97%   Weight: 89.7 kg (197 lb 11.2 oz)   Height: 177.8 cm (70\")   PainSc: 0-No pain     Estimated body mass index is 28.37 kg/mý as calculated from the following:    Height as of this encounter: 177.8 cm (70\").    Weight as of this encounter: 89.7 kg (197 lb 11.2 oz).    BMI is >= 25 and <30. (Overweight) The following options were offered after discussion;: weight loss educational material (shared in after visit summary), exercise counseling/recommendations, and nutrition counseling/recommendations      Does the patient have evidence of cognitive impairment? No    Physical Exam  Constitutional:       General: He is not in acute distress.     Appearance: He is well-developed.   HENT:      Head: Normocephalic and atraumatic.      Nose: Nose normal.   Eyes:      General:         Right eye: No discharge.         Left eye: No discharge.      Conjunctiva/sclera: Conjunctivae normal.      Pupils: Pupils are equal, round, and reactive to light.   Neck:      Thyroid: No thyromegaly.   Cardiovascular:      Rate and Rhythm: Normal rate and regular rhythm.      Heart sounds: Normal heart sounds. No murmur heard.  Pulmonary:      Effort: " Pulmonary effort is normal. No respiratory distress.      Breath sounds: Normal breath sounds. No wheezing or rales.   Chest:      Chest wall: No tenderness.   Abdominal:      General: Bowel sounds are normal. There is no distension.      Palpations: Abdomen is soft. There is no mass.      Tenderness: There is no abdominal tenderness.      Hernia: No hernia is present.   Musculoskeletal:         General: No deformity. Normal range of motion.      Cervical back: Normal range of motion.   Lymphadenopathy:      Cervical: No cervical adenopathy.   Skin:     General: Skin is warm and dry.      Coloration: Skin is not pale.      Findings: No rash.   Neurological:      Mental Status: He is alert and oriented to person, place, and time.      Deep Tendon Reflexes: Reflexes are normal and symmetric.   Psychiatric:         Behavior: Behavior normal.         Thought Content: Thought content normal.         Judgment: Judgment normal.     HEALTH RISK ASSESSMENT    Smoking Status:  Social History     Tobacco Use   Smoking Status Never   Smokeless Tobacco Never     Alcohol Consumption:  Social History     Substance and Sexual Activity   Alcohol Use Not Currently     Fall Risk Screen:    [facility] Fall Risk Assessment was completed, and patient is at LOW risk for falls.Assessment completed on:2023    Depression Screenin/29/2023     8:00 AM   PHQ-2/PHQ-9 Depression Screening   Little Interest or Pleasure in Doing Things 0-->not at all   Feeling Down, Depressed or Hopeless 0-->not at all   PHQ-9: Brief Depression Severity Measure Score 0       Health Habits and Functional and Cognitive Screenin/29/2023     8:00 AM   Functional & Cognitive Status   Do you have difficulty preparing food and eating? No   Do you have difficulty bathing yourself, getting dressed or grooming yourself? No   Do you have difficulty using the toilet? No   Do you have difficulty moving around from place to place? No   Do you have trouble with  steps or getting out of a bed or a chair? No   Current Diet Well Balanced Diet   Dental Exam Up to date   Eye Exam Up to date   Exercise (times per week) 0 times per week   Current Exercises Include No Regular Exercise   Do you need help using the phone?  No   Are you deaf or do you have serious difficulty hearing?  Yes   Do you need help to go to places out of walking distance? No   Do you need help shopping? No   Do you need help preparing meals?  No   Do you need help with housework?  No   Do you need help with laundry? No   Do you need help taking your medications? No   Do you need help managing money? No   Do you ever drive or ride in a car without wearing a seat belt? No   Have you felt unusual stress, anger or loneliness in the last month? No   Who do you live with? Spouse   If you need help, do you have trouble finding someone available to you? No   Have you been bothered in the last four weeks by sexual problems? No   Do you have difficulty concentrating, remembering or making decisions? No       Age-appropriate Screening Schedule:  Refer to the list below for future screening recommendations based on patient's age, sex and/or medical conditions. Orders for these recommended tests are listed in the plan section. The patient has been provided with a written plan.    Health Maintenance   Topic Date Due    COVID-19 Vaccine (3 - Pfizer series) 05/24/2021    ANNUAL WELLNESS VISIT  08/23/2023    BMI FOLLOWUP  08/23/2023    INFLUENZA VACCINE  10/01/2023    LIPID PANEL  02/17/2024    COLONOSCOPY  02/22/2026    TDAP/TD VACCINES (2 - Td or Tdap) 10/11/2026    HEPATITIS C SCREENING  Completed    Pneumococcal Vaccine 65+  Completed    ZOSTER VACCINE  Completed              Assessment & Plan   CMS Preventative Services Quick Reference  Risk Factors Identified During Encounter  Immunizations Discussed/Encouraged: COVID19  The above risks/problems have been discussed with the patient.  Follow up actions/plans if indicated  are seen below in the Assessment/Plan Section.  Pertinent information has been shared with the patient in the After Visit Summary.    Diagnoses and all orders for this visit:    1. Medicare annual wellness visit, subsequent (Primary)    2. Mixed hyperlipidemia  -     fenofibrate (TRICOR) 145 MG tablet; Take 1 tablet by mouth Every Night. Takes in the evening  Dispense: 90 tablet; Refill: 3  -     pravastatin (PRAVACHOL) 20 MG tablet; Take 1 tablet by mouth Every Night.  Dispense: 90 tablet; Refill: 1    3. Essential hypertension  -     irbesartan-hydrochlorothiazide (AVALIDE) 300-12.5 MG tablet; Take 1 tablet by mouth Daily.  Dispense: 90 tablet; Refill: 3    4. Other insomnia  -     traZODone (DESYREL) 50 MG tablet; TAKE 1 TO 2 TABLETS BY MOUTH EVERY DAY AT BEDTIME AS NEEDED  Dispense: 180 tablet; Refill: 1      Follow Up:   Return in about 6 months (around 2/29/2024) for Recheck. Continue current medications. BMI is >= 25 and <30. (Overweight) The following options were offered after discussion;: weight loss educational material (shared in after visit summary), exercise counseling/recommendations, and nutrition counseling/recommendations        An After Visit Summary and PPPS were made available to the patient.  Information has been scanned into chart. Discussed importance of taking medications as prescribed. Encouraged healthy eating habits with low fat, low salt choices and working towards maintaining a healthy weight. Recommended regular exercise if able as well as care to prevent falls including avoiding anything on the floor that they could slip or trip on such as throw rugs, making sure they have a bathmat to step onto when their feet are wet and having grab bars and railings where needed.

## 2023-09-06 RX ORDER — CLOPIDOGREL BISULFATE 75 MG/1
75 TABLET ORAL DAILY
Qty: 30 TABLET | Refills: 3 | Status: SHIPPED | OUTPATIENT
Start: 2023-09-06

## 2023-09-06 RX ORDER — CLOPIDOGREL BISULFATE 75 MG/1
75 TABLET ORAL DAILY
Qty: 30 TABLET | Refills: 3 | Status: SHIPPED | OUTPATIENT
Start: 2023-09-06 | End: 2023-09-06 | Stop reason: SDUPTHER

## 2023-09-11 ENCOUNTER — LAB (OUTPATIENT)
Dept: LAB | Facility: HOSPITAL | Age: 68
End: 2023-09-11
Payer: MEDICARE

## 2023-09-11 DIAGNOSIS — E78.2 MIXED HYPERLIPIDEMIA: ICD-10-CM

## 2023-09-11 DIAGNOSIS — I10 ESSENTIAL HYPERTENSION: ICD-10-CM

## 2023-09-11 DIAGNOSIS — Z12.5 ENCOUNTER FOR PROSTATE CANCER SCREENING: ICD-10-CM

## 2023-09-11 LAB
ALBUMIN SERPL-MCNC: 4.9 G/DL (ref 3.5–5.2)
ALBUMIN/GLOB SERPL: 2.6 G/DL
ALP SERPL-CCNC: 41 U/L (ref 39–117)
ALT SERPL W P-5'-P-CCNC: 58 U/L (ref 1–41)
ANION GAP SERPL CALCULATED.3IONS-SCNC: 8 MMOL/L (ref 5–15)
AST SERPL-CCNC: 41 U/L (ref 1–40)
BASOPHILS # BLD AUTO: 0.03 10*3/MM3 (ref 0–0.2)
BASOPHILS NFR BLD AUTO: 0.8 % (ref 0–1.5)
BILIRUB SERPL-MCNC: 0.6 MG/DL (ref 0–1.2)
BILIRUB UR QL STRIP: NEGATIVE
BUN SERPL-MCNC: 17 MG/DL (ref 8–23)
BUN/CREAT SERPL: 17.2 (ref 7–25)
CALCIUM SPEC-SCNC: 9.7 MG/DL (ref 8.6–10.5)
CHLORIDE SERPL-SCNC: 107 MMOL/L (ref 98–107)
CHOLEST SERPL-MCNC: 180 MG/DL (ref 0–200)
CLARITY UR: CLEAR
CO2 SERPL-SCNC: 24 MMOL/L (ref 22–29)
COLOR UR: YELLOW
CREAT SERPL-MCNC: 0.99 MG/DL (ref 0.76–1.27)
DEPRECATED RDW RBC AUTO: 44.1 FL (ref 37–54)
EGFRCR SERPLBLD CKD-EPI 2021: 82.5 ML/MIN/1.73
EOSINOPHIL # BLD AUTO: 0.08 10*3/MM3 (ref 0–0.4)
EOSINOPHIL NFR BLD AUTO: 2.2 % (ref 0.3–6.2)
ERYTHROCYTE [DISTWIDTH] IN BLOOD BY AUTOMATED COUNT: 13.1 % (ref 12.3–15.4)
GLOBULIN UR ELPH-MCNC: 1.9 GM/DL
GLUCOSE SERPL-MCNC: 108 MG/DL (ref 65–99)
GLUCOSE UR STRIP-MCNC: NEGATIVE MG/DL
HCT VFR BLD AUTO: 40.3 % (ref 37.5–51)
HDLC SERPL-MCNC: 31 MG/DL (ref 40–60)
HGB BLD-MCNC: 14.2 G/DL (ref 13–17.7)
HGB UR QL STRIP.AUTO: NEGATIVE
IMM GRANULOCYTES # BLD AUTO: 0.01 10*3/MM3 (ref 0–0.05)
IMM GRANULOCYTES NFR BLD AUTO: 0.3 % (ref 0–0.5)
KETONES UR QL STRIP: NEGATIVE
LDLC SERPL CALC-MCNC: 101 MG/DL (ref 0–100)
LDLC/HDLC SERPL: 2.99 {RATIO}
LEUKOCYTE ESTERASE UR QL STRIP.AUTO: NEGATIVE
LYMPHOCYTES # BLD AUTO: 1.23 10*3/MM3 (ref 0.7–3.1)
LYMPHOCYTES NFR BLD AUTO: 34.6 % (ref 19.6–45.3)
MCH RBC QN AUTO: 32.1 PG (ref 26.6–33)
MCHC RBC AUTO-ENTMCNC: 35.2 G/DL (ref 31.5–35.7)
MCV RBC AUTO: 91.2 FL (ref 79–97)
MONOCYTES # BLD AUTO: 0.33 10*3/MM3 (ref 0.1–0.9)
MONOCYTES NFR BLD AUTO: 9.3 % (ref 5–12)
NEUTROPHILS NFR BLD AUTO: 1.88 10*3/MM3 (ref 1.7–7)
NEUTROPHILS NFR BLD AUTO: 52.8 % (ref 42.7–76)
NITRITE UR QL STRIP: NEGATIVE
NRBC BLD AUTO-RTO: 0 /100 WBC (ref 0–0.2)
PH UR STRIP.AUTO: 8 [PH] (ref 5–9)
PLATELET # BLD AUTO: 204 10*3/MM3 (ref 140–450)
PMV BLD AUTO: 10.4 FL (ref 6–12)
POTASSIUM SERPL-SCNC: 4.5 MMOL/L (ref 3.5–5.2)
PROT SERPL-MCNC: 6.8 G/DL (ref 6–8.5)
PROT UR QL STRIP: NEGATIVE
PSA SERPL-MCNC: 2.96 NG/ML (ref 0–4)
RBC # BLD AUTO: 4.42 10*6/MM3 (ref 4.14–5.8)
SODIUM SERPL-SCNC: 139 MMOL/L (ref 136–145)
SP GR UR STRIP: 1.02 (ref 1–1.03)
TRIGL SERPL-MCNC: 281 MG/DL (ref 0–150)
UROBILINOGEN UR QL STRIP: NORMAL
VLDLC SERPL-MCNC: 48 MG/DL (ref 5–40)
WBC NRBC COR # BLD: 3.56 10*3/MM3 (ref 3.4–10.8)

## 2023-09-11 PROCEDURE — 80053 COMPREHEN METABOLIC PANEL: CPT

## 2023-09-11 PROCEDURE — 81003 URINALYSIS AUTO W/O SCOPE: CPT

## 2023-09-11 PROCEDURE — 85025 COMPLETE CBC W/AUTO DIFF WBC: CPT

## 2023-09-11 PROCEDURE — 80061 LIPID PANEL: CPT

## 2023-09-11 PROCEDURE — G0103 PSA SCREENING: HCPCS

## 2023-09-11 PROCEDURE — 36415 COLL VENOUS BLD VENIPUNCTURE: CPT

## (undated) DEVICE — MODEL AT P65, P/N 701554-001KIT CONTENTS: HAND CONTROLLER, 3-WAY HIGH-PRESSURE STOPCOCK WITH ROTATING END AND PREMIUM HIGH-PRESSURE TUBING: Brand: ANGIOTOUCH® KIT

## (undated) DEVICE — INTRO SHEATH ART/FEM ENGAGE .038 6F12CM

## (undated) DEVICE — CATH DIAG EXPO M/ PK 6FR FL4/FR4 PIG 3PK

## (undated) DEVICE — NAVVUS II CATHETER, P/N 701711-002: Brand: ACIST NAVVUS®II CATHETER

## (undated) DEVICE — ANGIO-SEAL VIP VASCULAR CLOSURE DEVICE: Brand: ANGIO-SEAL

## (undated) DEVICE — GW CHOICE PT J 300CM

## (undated) DEVICE — PK CATH LAB 60

## (undated) DEVICE — MODEL BT2000 P/N 700287-012KIT CONTENTS: MANIFOLD WITH SALINE AND CONTRAST PORTS, SALINE TUBING WITH SPIKE AND HAND SYRINGE, TRANSDUCER: Brand: BT2000 AUTOMATED MANIFOLD KIT

## (undated) DEVICE — ST CVR PROB PULLUP ULTRASND 5X48IN

## (undated) DEVICE — CATH GUIDE LAUNCHER JR4.0 6F 100CM

## (undated) DEVICE — ELECTRODE,RT,STRESS,FOAM,50PK: Brand: MEDLINE

## (undated) DEVICE — GW PERIPH GUIDERIGHT STD/J/TP PTFE/PCOAT SS 0.038IN 5X150CM

## (undated) DEVICE — KT INTRO MINISTICK MAX W/GW NITNL/TUNG ECHO 4F 21G 7CM

## (undated) DEVICE — BAND BAG 36" X 25": Brand: STERIMED

## (undated) DEVICE — COPILOT KIT INCLUDES BLEEDBACK CONTROL VALVE / GUIDE WIRE INTRODUCER / TORQUE DEVICE: Brand: ACCESSORIES

## (undated) DEVICE — A2000 MULTI-USE SYRINGE KIT, P/N 701277-003KIT CONTENTS: 100ML CONTRAST RESERVOIR AND TUBING WITH CONTRAST SPIKE AND CLAMP: Brand: A2000 MULTI-USE SYRINGE KIT